# Patient Record
Sex: FEMALE | Race: WHITE | NOT HISPANIC OR LATINO | Employment: OTHER | URBAN - METROPOLITAN AREA
[De-identification: names, ages, dates, MRNs, and addresses within clinical notes are randomized per-mention and may not be internally consistent; named-entity substitution may affect disease eponyms.]

---

## 2017-06-13 ENCOUNTER — HOSPITAL ENCOUNTER (OUTPATIENT)
Dept: MAMMOGRAPHY | Facility: HOSPITAL | Age: 62
Discharge: HOME | End: 2017-06-13
Attending: INTERNAL MEDICINE | Admitting: INTERNAL MEDICINE

## 2017-06-20 ENCOUNTER — HOSPITAL ENCOUNTER (OUTPATIENT)
Dept: FAMILY MEDICINE CLINIC | Facility: CLINIC | Age: 62
Setting detail: SPECIMEN
Discharge: HOME | End: 2017-06-20
Attending: INTERNAL MEDICINE | Admitting: INTERNAL MEDICINE

## 2017-06-20 LAB
ALBUMIN SERPL-MCNC: 4.1 G/DL (ref 3.5–4.8)
ALBUMIN/GLOB SERPL: 1.5 {RATIO} (ref 1–1.7)
ALP SERPL-CCNC: 77 IU/L (ref 32–91)
ALT SERPL-CCNC: 28 IU/L (ref 14–54)
ANION GAP SERPL CALC-SCNC: 12.3 MMOL/L (ref 10–20)
AST SERPL-CCNC: 27 IU/L (ref 15–41)
BACTERIA SPEC AEROBE CULT: NORMAL
BASOPHILS # BLD AUTO: 0 10*3/UL (ref 0–0.2)
BASOPHILS NFR BLD AUTO: 1 % (ref 0–2)
BILIRUB SERPL-MCNC: 0.6 MG/DL (ref 0.3–1.2)
BUN SERPL-MCNC: 13 MG/DL (ref 8–20)
BUN/CREAT SERPL: 16.3 (ref 5.4–26.2)
CALCIUM SERPL-MCNC: 9.4 MG/DL (ref 8.9–10.3)
CHLORIDE SERPL-SCNC: 105 MMOL/L (ref 101–111)
CHOLEST SERPL-MCNC: 173 MG/DL
CHOLEST/HDLC SERPL: 2.9 {RATIO}
CONV CO2: 27 MMOL/L (ref 22–32)
CONV LDL CHOLESTEROL DIRECT: 99 MG/DL (ref 0–100)
CONV TOTAL PROTEIN: 6.8 G/DL (ref 6.1–7.9)
CREAT UR-MCNC: 0.8 MG/DL (ref 0.4–1)
DIFFERENTIAL METHOD BLD: (no result)
EOSINOPHIL # BLD AUTO: 0.1 10*3/UL (ref 0–0.3)
EOSINOPHIL # BLD AUTO: 2 % (ref 0–3)
ERYTHROCYTE [DISTWIDTH] IN BLOOD BY AUTOMATED COUNT: 16 % (ref 11.5–14.5)
GLOBULIN UR ELPH-MCNC: 2.7 G/DL (ref 2.5–3.8)
GLUCOSE SERPL-MCNC: 102 MG/DL (ref 65–99)
HCT VFR BLD AUTO: 40.2 % (ref 35–49)
HDLC SERPL-MCNC: 60 MG/DL
HGB BLD-MCNC: 13.4 G/DL (ref 12–15)
LDLC/HDLC SERPL: 1.6 {RATIO}
LIPID INTERPRETATION: NORMAL
LYMPHOCYTES # BLD AUTO: 1.6 10*3/UL (ref 0.8–4.8)
LYMPHOCYTES NFR BLD AUTO: 30 % (ref 18–42)
Lab: NORMAL
MCH RBC QN AUTO: 28 PG (ref 26–32)
MCHC RBC AUTO-ENTMCNC: 33.2 G/DL (ref 32–36)
MCV RBC AUTO: 84.1 FL (ref 80–94)
MICRO REPORT STATUS: NORMAL
MONOCYTES # BLD AUTO: 0.4 10*3/UL (ref 0.1–1.3)
MONOCYTES NFR BLD AUTO: 7 % (ref 2–11)
NEUTROPHILS # BLD AUTO: 3.3 10*3/UL (ref 2.3–8.6)
NEUTROPHILS NFR BLD AUTO: 60 % (ref 50–75)
NRBC BLD AUTO-RTO: 0 /100{WBCS}
NRBC/RBC NFR BLD MANUAL: 0 10*3/UL
PLATELET # BLD AUTO: 190 10*3/UL (ref 150–450)
PMV BLD AUTO: 9.4 FL (ref 7.4–10.4)
POTASSIUM SERPL-SCNC: 4.3 MMOL/L (ref 3.6–5.1)
RBC # BLD AUTO: 4.78 10*6/UL (ref 4–5.4)
SODIUM SERPL-SCNC: 140 MMOL/L (ref 136–144)
SPECIMEN SOURCE: NORMAL
TRIGL SERPL-MCNC: 49 MG/DL
VLDLC SERPL CALC-MCNC: 13.5 MG/DL
WBC # BLD AUTO: 5.5 10*3/UL (ref 4.5–11.5)

## 2017-06-21 LAB — CANCER AG125 SERPL-ACNC: 8 U/ML (ref 0–35)

## 2018-06-18 ENCOUNTER — HOSPITAL ENCOUNTER (OUTPATIENT)
Dept: MAMMOGRAPHY | Facility: HOSPITAL | Age: 63
Discharge: HOME | End: 2018-06-18
Attending: INTERNAL MEDICINE | Admitting: INTERNAL MEDICINE

## 2018-06-22 ENCOUNTER — HOSPITAL ENCOUNTER (OUTPATIENT)
Dept: FAMILY MEDICINE CLINIC | Facility: CLINIC | Age: 63
Setting detail: SPECIMEN
Discharge: HOME | End: 2018-06-22
Attending: INTERNAL MEDICINE | Admitting: INTERNAL MEDICINE

## 2018-06-22 LAB
ALBUMIN SERPL-MCNC: 4.4 G/DL (ref 3.5–4.8)
ALBUMIN/GLOB SERPL: 1.6 {RATIO} (ref 1–1.7)
ALP SERPL-CCNC: 73 IU/L (ref 32–91)
ALT SERPL-CCNC: 19 IU/L (ref 14–54)
ANION GAP SERPL CALC-SCNC: 11.2 MMOL/L (ref 10–20)
AST SERPL-CCNC: 23 IU/L (ref 15–41)
BASOPHILS # BLD AUTO: 0 10*3/UL (ref 0–0.2)
BASOPHILS NFR BLD AUTO: 1 % (ref 0–2)
BILIRUB SERPL-MCNC: 0.6 MG/DL (ref 0.3–1.2)
BILIRUB UR QL STRIP: NEGATIVE MG/DL
BUN SERPL-MCNC: 17 MG/DL (ref 8–20)
BUN/CREAT SERPL: 21.3 (ref 5.4–26.2)
CALCIUM SERPL-MCNC: 9.6 MG/DL (ref 8.9–10.3)
CASTS URNS QL MICRO: NORMAL /[LPF]
CHLORIDE SERPL-SCNC: 103 MMOL/L (ref 101–111)
CHOLEST SERPL-MCNC: 210 MG/DL
CHOLEST/HDLC SERPL: 2.6 {RATIO}
COLOR UR: YELLOW
CONV BACTERIA IN URINE MICRO: NEGATIVE
CONV CLARITY OF URINE: CLEAR
CONV CO2: 28 MMOL/L (ref 22–32)
CONV HYALINE CASTS IN URINE MICRO: 0 /[LPF] (ref 0–5)
CONV LDL CHOLESTEROL DIRECT: 118 MG/DL (ref 0–100)
CONV PROTEIN IN URINE BY AUTOMATED TEST STRIP: NEGATIVE MG/DL
CONV SMALL ROUND CELLS: NORMAL /[HPF]
CONV TOTAL PROTEIN: 7.2 G/DL (ref 6.1–7.9)
CONV UROBILINOGEN IN URINE BY AUTOMATED TEST STRIP: 0.2 MG/DL
CREAT UR-MCNC: 0.8 MG/DL (ref 0.4–1)
CULTURE INDICATED?: NORMAL
DIFFERENTIAL METHOD BLD: (no result)
EOSINOPHIL # BLD AUTO: 0.1 10*3/UL (ref 0–0.3)
EOSINOPHIL # BLD AUTO: 2 % (ref 0–3)
ERYTHROCYTE [DISTWIDTH] IN BLOOD BY AUTOMATED COUNT: 16.7 % (ref 11.5–14.5)
GLOBULIN UR ELPH-MCNC: 2.8 G/DL (ref 2.5–3.8)
GLUCOSE SERPL-MCNC: 82 MG/DL (ref 65–99)
GLUCOSE UR QL: NEGATIVE MG/DL
HCT VFR BLD AUTO: 42.2 % (ref 35–49)
HDLC SERPL-MCNC: 81 MG/DL
HGB BLD-MCNC: 13.7 G/DL (ref 12–15)
HGB UR QL STRIP: NEGATIVE
KETONES UR QL STRIP: NEGATIVE MG/DL
LDLC/HDLC SERPL: 1.5 {RATIO}
LEUKOCYTE ESTERASE UR QL STRIP: NEGATIVE
LIPID INTERPRETATION: ABNORMAL
LYMPHOCYTES # BLD AUTO: 1.6 10*3/UL (ref 0.8–4.8)
LYMPHOCYTES NFR BLD AUTO: 32 % (ref 18–42)
MCH RBC QN AUTO: 27.3 PG (ref 26–32)
MCHC RBC AUTO-ENTMCNC: 32.4 G/DL (ref 32–36)
MCV RBC AUTO: 84.2 FL (ref 80–94)
MONOCYTES # BLD AUTO: 0.3 10*3/UL (ref 0.1–1.3)
MONOCYTES NFR BLD AUTO: 6 % (ref 2–11)
NEUTROPHILS # BLD AUTO: 3 10*3/UL (ref 2.3–8.6)
NEUTROPHILS NFR BLD AUTO: 59 % (ref 50–75)
NITRITE UR QL STRIP: NEGATIVE
NRBC BLD AUTO-RTO: 0 /100{WBCS}
NRBC/RBC NFR BLD MANUAL: 0 10*3/UL
PH UR STRIP.AUTO: 5.5 [PH] (ref 4.5–8)
PLATELET # BLD AUTO: 196 10*3/UL (ref 150–450)
PMV BLD AUTO: 8.9 FL (ref 7.4–10.4)
POTASSIUM SERPL-SCNC: 4.2 MMOL/L (ref 3.6–5.1)
RBC # BLD AUTO: 5.01 10*6/UL (ref 4–5.4)
RBC #/AREA URNS HPF: 1 /[HPF] (ref 0–3)
SODIUM SERPL-SCNC: 138 MMOL/L (ref 136–144)
SP GR UR: 1.02 (ref 1–1.03)
SPERM URNS QL MICRO: NORMAL /[HPF]
SQUAMOUS SPT QL MICRO: 1 /[HPF] (ref 0–5)
TRIGL SERPL-MCNC: 48 MG/DL
UNIDENT CRYS URNS QL MICRO: NORMAL /[HPF]
VLDLC SERPL CALC-MCNC: 11 MG/DL
WBC # BLD AUTO: 5.1 10*3/UL (ref 4.5–11.5)
WBC #/AREA URNS HPF: 1 /[HPF] (ref 0–5)
YEAST SPEC QL WET PREP: NORMAL /[HPF]

## 2019-03-13 ENCOUNTER — HOSPITAL ENCOUNTER (OUTPATIENT)
Dept: URGENT CARE | Facility: CLINIC | Age: 64
Discharge: HOME | End: 2019-03-13
Attending: FAMILY MEDICINE | Admitting: FAMILY MEDICINE

## 2019-06-28 RX ORDER — ALPRAZOLAM 0.5 MG/1
TABLET ORAL
Qty: 60 TABLET | Refills: 2 | Status: SHIPPED | OUTPATIENT
Start: 2019-06-28 | End: 2020-01-24

## 2019-07-22 ENCOUNTER — TELEPHONE (OUTPATIENT)
Dept: FAMILY MEDICINE CLINIC | Facility: CLINIC | Age: 64
End: 2019-07-22

## 2019-07-22 DIAGNOSIS — Z12.39 SCREENING FOR BREAST CANCER: Primary | ICD-10-CM

## 2019-07-22 NOTE — TELEPHONE ENCOUNTER
Patient called and scheduled physical on 8/28 and fasting labs on 8/21. Please enter lab order.     Also, patient is needing annual mammo order sent to [facility].

## 2019-07-25 DIAGNOSIS — M19.90 OSTEOARTHRITIS, UNSPECIFIED OSTEOARTHRITIS TYPE, UNSPECIFIED SITE: Primary | ICD-10-CM

## 2019-07-25 DIAGNOSIS — Z00.00 PREVENTATIVE HEALTH CARE: ICD-10-CM

## 2019-07-25 NOTE — TELEPHONE ENCOUNTER
"I wrote the order for the labs but I\"m not sure which one is the correct one for the mammo so I left it for you to show me!  "

## 2019-07-29 ENCOUNTER — HOSPITAL ENCOUNTER (OUTPATIENT)
Dept: MAMMOGRAPHY | Facility: HOSPITAL | Age: 64
Discharge: HOME | End: 2019-07-29
Admitting: INTERNAL MEDICINE

## 2019-07-29 DIAGNOSIS — Z12.39 SCREENING FOR BREAST CANCER: ICD-10-CM

## 2019-07-29 PROCEDURE — 77067 SCR MAMMO BI INCL CAD: CPT

## 2019-07-29 PROCEDURE — 77063 BREAST TOMOSYNTHESIS BI: CPT

## 2019-08-21 ENCOUNTER — LAB (OUTPATIENT)
Dept: FAMILY MEDICINE CLINIC | Facility: CLINIC | Age: 64
End: 2019-08-21

## 2019-08-21 DIAGNOSIS — M19.90 OSTEOARTHRITIS, UNSPECIFIED OSTEOARTHRITIS TYPE, UNSPECIFIED SITE: ICD-10-CM

## 2019-08-21 DIAGNOSIS — Z00.00 PREVENTATIVE HEALTH CARE: ICD-10-CM

## 2019-08-21 LAB
25(OH)D3 SERPL-MCNC: 29.7 NG/ML (ref 30–100)
ALBUMIN SERPL-MCNC: 4 G/DL (ref 3.5–4.8)
ALBUMIN/GLOB SERPL: 1.5 G/DL (ref 1–1.7)
ALP SERPL-CCNC: 70 U/L (ref 32–91)
ALT SERPL W P-5'-P-CCNC: 22 U/L (ref 14–54)
ANION GAP SERPL CALCULATED.3IONS-SCNC: 15.4 MMOL/L (ref 5–15)
ARTICHOKE IGE QN: 112 MG/DL (ref 0–100)
AST SERPL-CCNC: 21 U/L (ref 15–41)
BASOPHILS # BLD AUTO: 0 10*3/MM3 (ref 0–0.2)
BASOPHILS NFR BLD AUTO: 0.6 % (ref 0–1.5)
BILIRUB SERPL-MCNC: 0.6 MG/DL (ref 0.3–1.2)
BUN BLD-MCNC: 12 MG/DL (ref 8–20)
BUN/CREAT SERPL: 15 (ref 5.4–26.2)
CALCIUM SPEC-SCNC: 9.1 MG/DL (ref 8.9–10.3)
CHLORIDE SERPL-SCNC: 100 MMOL/L (ref 101–111)
CHOLEST SERPL-MCNC: 186 MG/DL
CO2 SERPL-SCNC: 26 MMOL/L (ref 22–32)
CREAT BLD-MCNC: 0.8 MG/DL (ref 0.4–1)
DEPRECATED RDW RBC AUTO: 49.4 FL (ref 37–54)
EOSINOPHIL # BLD AUTO: 0.1 10*3/MM3 (ref 0–0.4)
EOSINOPHIL NFR BLD AUTO: 1.7 % (ref 0.3–6.2)
ERYTHROCYTE [DISTWIDTH] IN BLOOD BY AUTOMATED COUNT: 15.9 % (ref 12.3–15.4)
GFR SERPL CREATININE-BSD FRML MDRD: 72 ML/MIN/1.73
GLOBULIN UR ELPH-MCNC: 2.6 GM/DL (ref 2.5–3.8)
GLUCOSE BLD-MCNC: 99 MG/DL (ref 65–99)
HCT VFR BLD AUTO: 38.3 % (ref 34–46.6)
HDLC SERPL QL: 2.91
HDLC SERPL-MCNC: 64 MG/DL
HGB BLD-MCNC: 12.7 G/DL (ref 12–15.9)
LDLC/HDLC SERPL: 1.64 {RATIO}
LYMPHOCYTES # BLD AUTO: 1.6 10*3/MM3 (ref 0.7–3.1)
LYMPHOCYTES NFR BLD AUTO: 31.9 % (ref 19.6–45.3)
MCH RBC QN AUTO: 29.2 PG (ref 26.6–33)
MCHC RBC AUTO-ENTMCNC: 33.2 G/DL (ref 31.5–35.7)
MCV RBC AUTO: 87.9 FL (ref 79–97)
MONOCYTES # BLD AUTO: 0.3 10*3/MM3 (ref 0.1–0.9)
MONOCYTES NFR BLD AUTO: 6.8 % (ref 5–12)
NEUTROPHILS # BLD AUTO: 3 10*3/MM3 (ref 1.7–7)
NEUTROPHILS NFR BLD AUTO: 59 % (ref 42.7–76)
NRBC BLD AUTO-RTO: 0.1 /100 WBC (ref 0–0.2)
PLATELET # BLD AUTO: 178 10*3/MM3 (ref 140–450)
PMV BLD AUTO: 8.7 FL (ref 6–12)
POTASSIUM BLD-SCNC: 4.4 MMOL/L (ref 3.6–5.1)
PROT SERPL-MCNC: 6.6 G/DL (ref 6.1–7.9)
RBC # BLD AUTO: 4.36 10*6/MM3 (ref 3.77–5.28)
SODIUM BLD-SCNC: 137 MMOL/L (ref 136–144)
TRIGL SERPL-MCNC: 86 MG/DL
TSH SERPL DL<=0.05 MIU/L-ACNC: 2.91 MIU/ML (ref 0.34–5.6)
VLDLC SERPL-MCNC: 17.2 MG/DL
WBC NRBC COR # BLD: 5.1 10*3/MM3 (ref 3.4–10.8)

## 2019-08-21 PROCEDURE — 80053 COMPREHEN METABOLIC PANEL: CPT | Performed by: INTERNAL MEDICINE

## 2019-08-21 PROCEDURE — 82306 VITAMIN D 25 HYDROXY: CPT | Performed by: INTERNAL MEDICINE

## 2019-08-21 PROCEDURE — 36415 COLL VENOUS BLD VENIPUNCTURE: CPT

## 2019-08-21 PROCEDURE — 84443 ASSAY THYROID STIM HORMONE: CPT | Performed by: INTERNAL MEDICINE

## 2019-08-21 PROCEDURE — 85025 COMPLETE CBC W/AUTO DIFF WBC: CPT | Performed by: INTERNAL MEDICINE

## 2019-08-21 PROCEDURE — 80061 LIPID PANEL: CPT | Performed by: INTERNAL MEDICINE

## 2019-08-28 ENCOUNTER — OFFICE VISIT (OUTPATIENT)
Dept: FAMILY MEDICINE CLINIC | Facility: CLINIC | Age: 64
End: 2019-08-28

## 2019-08-28 VITALS
SYSTOLIC BLOOD PRESSURE: 142 MMHG | WEIGHT: 174 LBS | BODY MASS INDEX: 29.71 KG/M2 | HEART RATE: 60 BPM | HEIGHT: 64 IN | RESPIRATION RATE: 16 BRPM | TEMPERATURE: 97.8 F | OXYGEN SATURATION: 90 % | DIASTOLIC BLOOD PRESSURE: 78 MMHG

## 2019-08-28 DIAGNOSIS — M85.859 OSTEOPENIA OF NECK OF FEMUR, UNSPECIFIED LATERALITY: Primary | ICD-10-CM

## 2019-08-28 DIAGNOSIS — Z00.00 ENCOUNTER FOR GENERAL ADULT MEDICAL EXAMINATION WITHOUT ABNORMAL FINDINGS: ICD-10-CM

## 2019-08-28 PROBLEM — F41.9 ANXIETY DISORDER: Status: ACTIVE | Noted: 2019-08-28

## 2019-08-28 PROBLEM — M19.90 OSTEOARTHRITIS: Status: ACTIVE | Noted: 2019-08-28

## 2019-08-28 PROBLEM — C50.919 BREAST CANCER: Status: ACTIVE | Noted: 2019-08-28

## 2019-08-28 PROBLEM — Z80.0 FAMILY HISTORY OF MALIGNANT NEOPLASM OF COLON: Status: ACTIVE | Noted: 2019-08-28

## 2019-08-28 PROCEDURE — G0402 INITIAL PREVENTIVE EXAM: HCPCS | Performed by: INTERNAL MEDICINE

## 2019-08-28 RX ORDER — CHLORAL HYDRATE 500 MG
1 CAPSULE ORAL
COMMUNITY

## 2019-08-28 RX ORDER — MELOXICAM 15 MG/1
15 TABLET ORAL DAILY
COMMUNITY
End: 2020-03-04

## 2019-08-28 RX ORDER — MULTIPLE VITAMINS W/ MINERALS TAB 9MG-400MCG
1 TAB ORAL DAILY
COMMUNITY

## 2019-08-28 RX ORDER — OMEPRAZOLE 20 MG/1
20 CAPSULE, DELAYED RELEASE ORAL DAILY PRN
COMMUNITY

## 2019-08-28 RX ORDER — ALPRAZOLAM 0.5 MG/1
0.5 TABLET ORAL 2 TIMES DAILY
COMMUNITY
End: 2019-08-28 | Stop reason: SDUPTHER

## 2019-08-28 NOTE — PROGRESS NOTES
The ABCs of the Annual Wellness Visit  Welcome to Medicare Visit    Chief Complaint   Patient presents with   • Annual Exam     Welcome to [facility] exam       Subjective   History of Present Illness:  Kitty Berry is a 63 y.o. female who presents for a  Welcome to Medicare Visit. Pt  Fell this year in April- on tree nut and broke foot.  All healed at this time.  sleeping well.  With 1/2 of xanax every night.  No chest pain or difficulty breathing- no wheezing, no GERD   increased 20 lbs  Since weight watchers-- realizes french fries and chips.  Understands more activity is needed again. Joints starting to bother her.   right shin with cramps for a couple weeks - now gone.    HEALTH RISK ASSESSMENT    Recent Hospitalizations:  No hospitalization(s) within the last year.    Current Medical Providers:  Patient Care Team:  Nadia Mcclure MD as PCP - General    Smoking Status:  Social History     Tobacco Use   Smoking Status Former Smoker   • Types: Cigarettes   • Last attempt to quit:    • Years since quittin.6   Smokeless Tobacco Never Used       Alcohol Consumption:  Social History     Substance and Sexual Activity   Alcohol Use Not on file       Depression Screen:   PHQ-2/PHQ-9 Depression Screening 2019   Little interest or pleasure in doing things 0   Feeling down, depressed, or hopeless 0   Total Score 0       Fall Risk Screen:  STEADI Fall Risk Assessment was completed, and patient is at HIGH risk for falls. Assessment completed on:2019    Health Habits and Functional and Cognitive Screening:  Functional & Cognitive Status 2019   Do you have difficulty preparing food and eating? No   Do you have difficulty bathing yourself, getting dressed or grooming yourself? No   Do you have difficulty using the toilet? No   Do you have difficulty moving around from place to place? No   Do you have trouble with steps or getting out of a bed or a chair? Yes   Current Diet Well Balanced Diet   Dental  Exam Up to date   Eye Exam Up to date   Exercise (times per week) 3 times per week   Current Exercise Activities Include Gardening   Do you need help using the phone?  No   Are you deaf or do you have serious difficulty hearing?  No   Do you need help with transportation? No   Do you need help shopping? No   Do you need help preparing meals?  No   Do you need help with housework?  No   Do you need help with laundry? No   Do you need help taking your medications? No   Do you need help managing money? No   Do you ever drive or ride in a car without wearing a seat belt? No   Have you felt unusual stress, anger or loneliness in the last month? No   Who do you live with? Spouse   If you need help, do you have trouble finding someone available to you? No   Have you been bothered in the last four weeks by sexual problems? No   Do you have difficulty concentrating, remembering or making decisions? No         Does the patient have evidence of cognitive impairment? No    Asprin use counseling:Does not need AS but is currently taking (discussed benefits vs risks and patient elects to stay on ASA)    Visual Acuity:    No exam data present    Age-appropriate Screening Schedule:  Refer to the list below for future screening recommendations based on patient's age, sex and/or medical conditions. Orders for these recommended tests are listed in the plan section. The patient has been provided with a written plan.    Health Maintenance   Topic Date Due   • TDAP/TD VACCINES (1 - Tdap) 10/28/1972   • ZOSTER VACCINE (1 of 2) 10/28/2003   • PNEUMOCOCCAL VACCINES (65+ LOW/MEDIUM RISK) (1 of 2 - PCV13) 10/28/2018   • COLONOSCOPY  06/28/2019   • INFLUENZA VACCINE  08/01/2019   • MAMMOGRAM  07/29/2021          The following portions of the patient's history were reviewed and updated as appropriate: current medications, past family history, past medical history, past social history, past surgical history and problem list.    Outpatient  Medications Prior to Visit   Medication Sig Dispense Refill   • ALPRAZolam (XANAX) 0.5 MG tablet TAKE ONE TABLET BY MOUTH TWICE A DAY 60 tablet 2   • meloxicam (MOBIC) 15 MG tablet Take 15 mg by mouth Daily.     • Multiple Vitamins-Minerals (MULTIVITAMIN WITH MINERALS) tablet tablet Take 1 tablet by mouth Daily.     • Omega-3 Fatty Acids (FISH OIL) 1000 MG capsule capsule Take 1 capsule by mouth Daily With Breakfast.     • omeprazole (priLOSEC) 20 MG capsule Take 20 mg by mouth Daily.     • ALPRAZolam (XANAX) 0.5 MG tablet Take 0.5 mg by mouth 2 (Two) Times a Day.       No facility-administered medications prior to visit.        Patient Active Problem List   Diagnosis   • Anxiety disorder   • Breast cancer (CMS/HCC)   • Encounter for general adult medical examination without abnormal findings   • Family history of malignant neoplasm of colon   • Gastroesophageal reflux disease   • Hypertension   • Osteoarthritis   • Screening for colon cancer       Advanced Care Planning:  Patient has an advance directive - a copy has been provided and is visible in patient header, Patient does not have an advance directive - not interested in additional information    Review of Systems   Constitutional: Negative for chills and fatigue.   HENT: Negative for congestion.    Respiratory: Negative for apnea, cough and wheezing.    Cardiovascular: Negative for chest pain.   Gastrointestinal: Negative for abdominal distention.   Musculoskeletal: Negative for arthralgias.   Neurological: Negative for seizures.   Psychiatric/Behavioral: Positive for sleep disturbance.       Compared to one year ago, the patient feels her physical health is the same.  Compared to one year ago, the patient feels her mental health is the same.    Reviewed chart for potential of high risk medication in the elderly: yes  Reviewed chart for potential of harmful drug interactions in the elderly:yes    Objective         Vitals:    08/28/19 1456   BP: 142/78   BP  "Location: Left arm   Patient Position: Sitting   Cuff Size: Adult   Pulse: 60   Resp: 16   Temp: 97.8 °F (36.6 °C)   TempSrc: Oral   SpO2: 90%   Weight: 78.9 kg (174 lb)   Height: 162.6 cm (64\")   PainSc:   4   PainLoc: Knee       Body mass index is 29.87 kg/m².  Discussed the patient's BMI with her. The BMI is in the acceptable range.    Physical Exam   Constitutional: She is oriented to person, place, and time. She appears well-developed and well-nourished.   HENT:   Head: Normocephalic and atraumatic.   Right Ear: Tympanic membrane normal.   Left Ear: Tympanic membrane normal.   Eyes: Pupils are equal, round, and reactive to light.   Neck: Normal range of motion. Neck supple.   Cardiovascular: Normal rate and regular rhythm.   No murmur heard.  Pulmonary/Chest: Effort normal and breath sounds normal. Right breast exhibits no inverted nipple, no mass and no nipple discharge. Left breast exhibits no inverted nipple, no mass and no nipple discharge.   Abdominal: Soft. Bowel sounds are normal. She exhibits no distension.   Musculoskeletal: Normal range of motion.   Neurological: She is oriented to person, place, and time.   Skin: Capillary refill takes less than 2 seconds.   Psychiatric: She has a normal mood and affect.   Nursing note and vitals reviewed.      Lab Results   Component Value Date    TRIG 86 08/21/2019    HDL 64 08/21/2019     (H) 08/21/2019    VLDL 17.2 08/21/2019          ECG 12 Lead  Date/Time: 8/29/2019 8:10 AM  Performed by: Nadia Mcclure MD  Authorized by: Nadia Mcclure MD   Comparison: not compared with previous ECG   Previous ECG: no previous ECG available  Rhythm: sinus rhythm  Rate: normal  Conduction: conduction normal  ST Segments: ST segments normal  QRS axis: normal  Other: no other findings    Clinical impression: normal ECG  Comments: NSR            Assessment/Plan   Medicare Risks and Personalized Health Plan  CMS Preventative Services Quick Reference  Fall " Risk    The above risks/problems have been discussed with the patient.  Pertinent information has been shared with the patient in the After Visit Summary.  Follow up plans and orders are seen below in the Assessment/Plan Section.    Diagnoses and all orders for this visit:    1. Osteopenia of neck of femur, unspecified laterality (Primary)  -     DEXA Bone Density Axial; Future    2. Encounter for general adult medical examination without abnormal findings  -     ECG 12 Lead        Follow Up:  Return in about 6 months (around 2/28/2020) for Medicare Wellness.     An After Visit Summary and PPPS were given to the patient.     During this visit for their annual exam, we reviewed their personal history, social history and family history.  We went over their medications and all the recommended health maintenence items for their age group. They were given the opportunity to ask questions and discuss other concerns.  [facility]

## 2019-08-29 PROCEDURE — 93000 ELECTROCARDIOGRAM COMPLETE: CPT | Performed by: INTERNAL MEDICINE

## 2019-09-04 ENCOUNTER — HOSPITAL ENCOUNTER (OUTPATIENT)
Dept: BONE DENSITY | Facility: HOSPITAL | Age: 64
Discharge: HOME | End: 2019-09-04
Admitting: INTERNAL MEDICINE

## 2019-09-04 DIAGNOSIS — M85.859 OSTEOPENIA OF NECK OF FEMUR, UNSPECIFIED LATERALITY: ICD-10-CM

## 2019-09-04 PROCEDURE — 77080 DXA BONE DENSITY AXIAL: CPT

## 2020-01-24 RX ORDER — ALPRAZOLAM 0.5 MG/1
TABLET ORAL
Qty: 60 TABLET | Refills: 2 | Status: SHIPPED | OUTPATIENT
Start: 2020-01-24 | End: 2020-09-14

## 2020-01-31 ENCOUNTER — TELEPHONE (OUTPATIENT)
Dept: FAMILY MEDICINE CLINIC | Facility: CLINIC | Age: 65
End: 2020-01-31

## 2020-01-31 NOTE — TELEPHONE ENCOUNTER
Will need appt before gets another Rx from me- if still has several  Rf's then not needed be seen early March

## 2020-01-31 NOTE — TELEPHONE ENCOUNTER
I called patient to reschedule her 6 month F/U appointment from 2/28/20. She states it was scheduled due to her being on Xanax. She has cut back and has only used 2 RF of this med and you had originally given her 6 RF. She wanted a message sent back to ask if she still needs appointment, now in early March, for F/U since she has only used 2 RF of the med. Thank you.

## 2020-02-06 ENCOUNTER — TELEPHONE (OUTPATIENT)
Dept: FAMILY MEDICINE CLINIC | Facility: CLINIC | Age: 65
End: 2020-02-06

## 2020-02-06 RX ORDER — LISINOPRIL AND HYDROCHLOROTHIAZIDE 12.5; 1 MG/1; MG/1
1 TABLET ORAL DAILY
Qty: 30 TABLET | Refills: 2 | Status: SHIPPED | OUTPATIENT
Start: 2020-02-06 | End: 2020-02-07

## 2020-02-06 NOTE — TELEPHONE ENCOUNTER
Patient went to give blood today and her blood pressure was too high.  It was 145/104.  She took it at home earlier and it was 135/85.  She used to be on bp medication but lost a bunch of weight and went off of it. She has gained about 30 pounds back. She does start Silver Sneakers on Monday trying to get some of the weight off.  Do you want to call in a bp medication?

## 2020-02-07 RX ORDER — AMLODIPINE BESYLATE 5 MG/1
5 TABLET ORAL DAILY
Qty: 30 TABLET | Refills: 5 | Status: SHIPPED | OUTPATIENT
Start: 2020-02-07 | End: 2020-08-04

## 2020-02-07 NOTE — TELEPHONE ENCOUNTER
Pt believes Lisinopril is the med that she took before that made her cough and drove her crazy. Do you want to switch to another med? Thank you.

## 2020-03-04 RX ORDER — MELOXICAM 15 MG/1
TABLET ORAL
Qty: 30 TABLET | Refills: 2 | Status: SHIPPED | OUTPATIENT
Start: 2020-03-04 | End: 2020-07-02

## 2020-06-03 ENCOUNTER — ON CAMPUS - OUTPATIENT (OUTPATIENT)
Dept: URBAN - METROPOLITAN AREA HOSPITAL 2 | Facility: HOSPITAL | Age: 65
End: 2020-06-03
Payer: MEDICARE

## 2020-06-03 ENCOUNTER — OFFICE (OUTPATIENT)
Dept: URBAN - METROPOLITAN AREA PATHOLOGY 4 | Facility: PATHOLOGY | Age: 65
End: 2020-06-03
Payer: COMMERCIAL

## 2020-06-03 ENCOUNTER — OFFICE (OUTPATIENT)
Dept: URBAN - METROPOLITAN AREA PATHOLOGY 4 | Facility: PATHOLOGY | Age: 65
End: 2020-06-03
Payer: MEDICARE

## 2020-06-03 VITALS
RESPIRATION RATE: 16 BRPM | HEART RATE: 67 BPM | OXYGEN SATURATION: 93 % | HEART RATE: 81 BPM | RESPIRATION RATE: 17 BRPM | HEART RATE: 76 BPM | HEART RATE: 66 BPM | RESPIRATION RATE: 18 BRPM | DIASTOLIC BLOOD PRESSURE: 83 MMHG | DIASTOLIC BLOOD PRESSURE: 72 MMHG | SYSTOLIC BLOOD PRESSURE: 114 MMHG | DIASTOLIC BLOOD PRESSURE: 78 MMHG | SYSTOLIC BLOOD PRESSURE: 118 MMHG | HEART RATE: 75 BPM | OXYGEN SATURATION: 100 % | DIASTOLIC BLOOD PRESSURE: 79 MMHG | WEIGHT: 187.4 LBS | OXYGEN SATURATION: 98 % | SYSTOLIC BLOOD PRESSURE: 101 MMHG | SYSTOLIC BLOOD PRESSURE: 130 MMHG | OXYGEN SATURATION: 97 % | SYSTOLIC BLOOD PRESSURE: 122 MMHG | SYSTOLIC BLOOD PRESSURE: 115 MMHG | DIASTOLIC BLOOD PRESSURE: 74 MMHG | DIASTOLIC BLOOD PRESSURE: 90 MMHG | HEIGHT: 62 IN | OXYGEN SATURATION: 99 % | HEART RATE: 71 BPM | TEMPERATURE: 97.5 F | SYSTOLIC BLOOD PRESSURE: 147 MMHG | OXYGEN SATURATION: 96 %

## 2020-06-03 DIAGNOSIS — K64.0 FIRST DEGREE HEMORRHOIDS: ICD-10-CM

## 2020-06-03 DIAGNOSIS — K57.30 DIVERTICULOSIS OF LARGE INTESTINE WITHOUT PERFORATION OR ABS: ICD-10-CM

## 2020-06-03 DIAGNOSIS — Z86.010 PERSONAL HISTORY OF COLONIC POLYPS: ICD-10-CM

## 2020-06-03 DIAGNOSIS — D12.3 BENIGN NEOPLASM OF TRANSVERSE COLON: ICD-10-CM

## 2020-06-03 DIAGNOSIS — D12.6 BENIGN NEOPLASM OF COLON, UNSPECIFIED: ICD-10-CM

## 2020-06-03 PROBLEM — K63.5 POLYP OF COLON: Status: ACTIVE | Noted: 2020-06-03

## 2020-06-03 PROBLEM — K57.32 DVTRCLI OF LG INT W/O PERFORATION OR ABSCESS W/O BLEEDING: Status: ACTIVE | Noted: 2020-06-03

## 2020-06-03 LAB
GI HISTOLOGY: A. UNSPECIFIED: (no result)
GI HISTOLOGY: PDF REPORT: (no result)

## 2020-06-03 PROCEDURE — 88305 TISSUE EXAM BY PATHOLOGIST: CPT | Mod: 26 | Performed by: INTERNAL MEDICINE

## 2020-06-03 PROCEDURE — 45385 COLONOSCOPY W/LESION REMOVAL: CPT | Mod: PT | Performed by: INTERNAL MEDICINE

## 2020-06-03 RX ORDER — CIPROFLOXACIN 500 MG/1
1000 TABLET, FILM COATED ORAL
Qty: 20 | Refills: 0 | Status: ACTIVE
Start: 2020-06-03

## 2020-07-02 ENCOUNTER — TELEPHONE (OUTPATIENT)
Dept: FAMILY MEDICINE CLINIC | Facility: CLINIC | Age: 65
End: 2020-07-02

## 2020-07-02 DIAGNOSIS — Z12.31 ENCOUNTER FOR SCREENING MAMMOGRAM FOR MALIGNANT NEOPLASM OF BREAST: ICD-10-CM

## 2020-07-02 DIAGNOSIS — Z12.39 SCREENING FOR BREAST CANCER: Primary | ICD-10-CM

## 2020-07-02 RX ORDER — MELOXICAM 15 MG/1
TABLET ORAL
Qty: 30 TABLET | Refills: 1 | Status: SHIPPED | OUTPATIENT
Start: 2020-07-02 | End: 2020-09-25

## 2020-07-02 NOTE — TELEPHONE ENCOUNTER
PATIENT IS REQUESTING FOR A LAB ORDER AND A MAMMOGRAM ORDER   SHE STATES  SHE WOULD LIKE TO KNOW IF YOU HAVE RECEIVED HER COLONOSCOPY RESULTS        GOOD CONTACT NUMBER     [phone] (P)

## 2020-07-07 ENCOUNTER — TELEPHONE (OUTPATIENT)
Dept: FAMILY MEDICINE CLINIC | Facility: CLINIC | Age: 65
End: 2020-07-07

## 2020-07-07 DIAGNOSIS — E55.9 VITAMIN D DEFICIENCY: ICD-10-CM

## 2020-07-07 DIAGNOSIS — Z12.31 ENCOUNTER FOR SCREENING MAMMOGRAM FOR MALIGNANT NEOPLASM OF BREAST: ICD-10-CM

## 2020-07-07 DIAGNOSIS — I10 ESSENTIAL HYPERTENSION: Primary | ICD-10-CM

## 2020-07-07 NOTE — TELEPHONE ENCOUNTER
Hub please give pt message below and find out where she would like to have her mammo and labs done.    Pathology of polyp was tubular adenoma (most common type non cancerous/ benign) so that would be 5 year for repeat.      Tried to call patient. Left Voice mail to find out where she wants to have her mammo and labs done at.

## 2020-07-07 NOTE — TELEPHONE ENCOUNTER
PATIENT CALLED IN REFERENCE TO MAMMOGRAM APPOINTMENT SHE WOULD LIKE TO GO TO [facility] AND SHE WOULD  LIKE TO HAVE HER LABS DONE AT THE OFFICE.    PLEASE CALL WITH APPOINTMENT TIME AND [phone]    SHE HAS AN APPOINTMENT ON 9/1/2020 AND WOULD LIKE TO HAVE THIS ALL DONE BEFORE THIS DATE.

## 2020-07-07 NOTE — TELEPHONE ENCOUNTER
Spoke with patient at 1:17pm and [facility] at 9:30am and instructed patient to call F to schedule mammogram.

## 2020-07-09 NOTE — TELEPHONE ENCOUNTER
Hub please give pt message below and find out where she would like to have her mammo and labs done.     Pathology of polyp was tubular adenoma (most common type non cancerous/ benign) so that would be 5 year for repeat.        Tried to call patient. Left Voice mail to find out where she wants to have her mammo and labs done at.  Tried to call again No answer left vm to call back

## 2020-07-10 ENCOUNTER — TELEPHONE (OUTPATIENT)
Dept: FAMILY MEDICINE CLINIC | Facility: CLINIC | Age: 65
End: 2020-07-10

## 2020-07-10 NOTE — TELEPHONE ENCOUNTER
PATIENT SAYS IF CALLBACK FROM  [facility] WAS ABOUT THE MAMMOGRAM THAT SHE HAS ALREADY SCHEDULED THAT AND IF THAT IS CASE SHE DOES NOT NEED TO CALL HER BACK.    IF IT IS REGARDING SOMETHING ELSE SHE CAN GO AHEAD AND GIVE HER A CALLBACK.    CALLBACK NUMBER IS:  [phone]

## 2020-08-04 RX ORDER — AMLODIPINE BESYLATE 5 MG/1
TABLET ORAL
Qty: 30 TABLET | Refills: 4 | Status: SHIPPED | OUTPATIENT
Start: 2020-08-04 | End: 2020-12-01 | Stop reason: SDUPTHER

## 2020-08-11 ENCOUNTER — HOSPITAL ENCOUNTER (OUTPATIENT)
Dept: MAMMOGRAPHY | Facility: HOSPITAL | Age: 65
Discharge: HOME | End: 2020-08-11
Admitting: INTERNAL MEDICINE

## 2020-08-11 DIAGNOSIS — Z12.31 ENCOUNTER FOR SCREENING MAMMOGRAM FOR MALIGNANT NEOPLASM OF BREAST: ICD-10-CM

## 2020-08-11 DIAGNOSIS — Z12.39 SCREENING FOR BREAST CANCER: ICD-10-CM

## 2020-08-11 PROCEDURE — 77067 SCR MAMMO BI INCL CAD: CPT

## 2020-08-11 PROCEDURE — 77063 BREAST TOMOSYNTHESIS BI: CPT

## 2020-08-25 ENCOUNTER — LAB (OUTPATIENT)
Dept: FAMILY MEDICINE CLINIC | Facility: CLINIC | Age: 65
End: 2020-08-25

## 2020-08-25 DIAGNOSIS — I10 ESSENTIAL HYPERTENSION: ICD-10-CM

## 2020-08-25 DIAGNOSIS — E55.9 VITAMIN D DEFICIENCY: ICD-10-CM

## 2020-08-25 LAB
25(OH)D3 SERPL-MCNC: 58.5 NG/ML (ref 30–100)
ALBUMIN SERPL-MCNC: 4.6 G/DL (ref 3.5–5.2)
ALBUMIN/GLOB SERPL: 2.1 G/DL
ALP SERPL-CCNC: 88 U/L (ref 39–117)
ALT SERPL W P-5'-P-CCNC: 68 U/L (ref 1–33)
ANION GAP SERPL CALCULATED.3IONS-SCNC: 12.8 MMOL/L (ref 5–15)
AST SERPL-CCNC: 40 U/L (ref 1–32)
BASOPHILS # BLD AUTO: 0.04 10*3/MM3 (ref 0–0.2)
BASOPHILS NFR BLD AUTO: 0.5 % (ref 0–1.5)
BILIRUB SERPL-MCNC: 0.3 MG/DL (ref 0–1.2)
BUN SERPL-MCNC: 19 MG/DL (ref 8–23)
BUN/CREAT SERPL: 21.6 (ref 7–25)
CALCIUM SPEC-SCNC: 9.2 MG/DL (ref 8.6–10.5)
CHLORIDE SERPL-SCNC: 97 MMOL/L (ref 98–107)
CHOLEST SERPL-MCNC: 229 MG/DL (ref 0–200)
CO2 SERPL-SCNC: 26.2 MMOL/L (ref 22–29)
CREAT SERPL-MCNC: 0.88 MG/DL (ref 0.57–1)
DEPRECATED RDW RBC AUTO: 45.7 FL (ref 37–54)
EOSINOPHIL # BLD AUTO: 0.07 10*3/MM3 (ref 0–0.4)
EOSINOPHIL NFR BLD AUTO: 0.8 % (ref 0.3–6.2)
ERYTHROCYTE [DISTWIDTH] IN BLOOD BY AUTOMATED COUNT: 13.9 % (ref 12.3–15.4)
GFR SERPL CREATININE-BSD FRML MDRD: 64 ML/MIN/1.73
GLOBULIN UR ELPH-MCNC: 2.2 GM/DL
GLUCOSE SERPL-MCNC: 93 MG/DL (ref 65–99)
HCT VFR BLD AUTO: 38 % (ref 34–46.6)
HDLC SERPL-MCNC: 69 MG/DL (ref 40–60)
HGB BLD-MCNC: 12.9 G/DL (ref 12–15.9)
IMM GRANULOCYTES # BLD AUTO: 0.06 10*3/MM3 (ref 0–0.05)
IMM GRANULOCYTES NFR BLD AUTO: 0.7 % (ref 0–0.5)
LDLC SERPL CALC-MCNC: 127 MG/DL (ref 0–100)
LDLC/HDLC SERPL: 1.84 {RATIO}
LYMPHOCYTES # BLD AUTO: 2.27 10*3/MM3 (ref 0.7–3.1)
LYMPHOCYTES NFR BLD AUTO: 27.5 % (ref 19.6–45.3)
MCH RBC QN AUTO: 30.5 PG (ref 26.6–33)
MCHC RBC AUTO-ENTMCNC: 33.9 G/DL (ref 31.5–35.7)
MCV RBC AUTO: 89.8 FL (ref 79–97)
MONOCYTES # BLD AUTO: 0.47 10*3/MM3 (ref 0.1–0.9)
MONOCYTES NFR BLD AUTO: 5.7 % (ref 5–12)
NEUTROPHILS NFR BLD AUTO: 5.34 10*3/MM3 (ref 1.7–7)
NEUTROPHILS NFR BLD AUTO: 64.8 % (ref 42.7–76)
NRBC BLD AUTO-RTO: 0 /100 WBC (ref 0–0.2)
PLATELET # BLD AUTO: 210 10*3/MM3 (ref 140–450)
PMV BLD AUTO: 10.4 FL (ref 6–12)
POTASSIUM SERPL-SCNC: 4.5 MMOL/L (ref 3.5–5.2)
PROT SERPL-MCNC: 6.8 G/DL (ref 6–8.5)
RBC # BLD AUTO: 4.23 10*6/MM3 (ref 3.77–5.28)
SODIUM SERPL-SCNC: 136 MMOL/L (ref 136–145)
TRIGL SERPL-MCNC: 165 MG/DL (ref 0–150)
TSH SERPL DL<=0.05 MIU/L-ACNC: 4.63 UIU/ML (ref 0.27–4.2)
VLDLC SERPL-MCNC: 33 MG/DL (ref 5–40)
WBC # BLD AUTO: 8.25 10*3/MM3 (ref 3.4–10.8)

## 2020-08-25 PROCEDURE — 80061 LIPID PANEL: CPT | Performed by: INTERNAL MEDICINE

## 2020-08-25 PROCEDURE — 85025 COMPLETE CBC W/AUTO DIFF WBC: CPT | Performed by: INTERNAL MEDICINE

## 2020-08-25 PROCEDURE — 80053 COMPREHEN METABOLIC PANEL: CPT | Performed by: INTERNAL MEDICINE

## 2020-08-25 PROCEDURE — 84443 ASSAY THYROID STIM HORMONE: CPT | Performed by: INTERNAL MEDICINE

## 2020-08-25 PROCEDURE — 82306 VITAMIN D 25 HYDROXY: CPT | Performed by: INTERNAL MEDICINE

## 2020-09-01 ENCOUNTER — OFFICE VISIT (OUTPATIENT)
Dept: FAMILY MEDICINE CLINIC | Facility: CLINIC | Age: 65
End: 2020-09-01

## 2020-09-01 ENCOUNTER — TELEPHONE (OUTPATIENT)
Dept: FAMILY MEDICINE CLINIC | Facility: CLINIC | Age: 65
End: 2020-09-01

## 2020-09-01 VITALS
RESPIRATION RATE: 16 BRPM | HEIGHT: 64 IN | HEART RATE: 95 BPM | DIASTOLIC BLOOD PRESSURE: 88 MMHG | WEIGHT: 196 LBS | BODY MASS INDEX: 33.46 KG/M2 | SYSTOLIC BLOOD PRESSURE: 130 MMHG | TEMPERATURE: 98 F | OXYGEN SATURATION: 96 %

## 2020-09-01 DIAGNOSIS — M15.9 PRIMARY OSTEOARTHRITIS INVOLVING MULTIPLE JOINTS: ICD-10-CM

## 2020-09-01 DIAGNOSIS — E03.9 ACQUIRED HYPOTHYROIDISM: ICD-10-CM

## 2020-09-01 DIAGNOSIS — Z23 ENCOUNTER FOR IMMUNIZATION: ICD-10-CM

## 2020-09-01 DIAGNOSIS — Z00.00 MEDICARE ANNUAL WELLNESS VISIT, INITIAL: Primary | ICD-10-CM

## 2020-09-01 DIAGNOSIS — Z13.6 ENCOUNTER FOR SCREENING FOR VASCULAR DISEASE: Primary | ICD-10-CM

## 2020-09-01 DIAGNOSIS — I10 ESSENTIAL HYPERTENSION: ICD-10-CM

## 2020-09-01 DIAGNOSIS — M51.37 DDD (DEGENERATIVE DISC DISEASE), LUMBOSACRAL: ICD-10-CM

## 2020-09-01 PROBLEM — M51.379 DDD (DEGENERATIVE DISC DISEASE), LUMBOSACRAL: Status: ACTIVE | Noted: 2020-09-01

## 2020-09-01 PROCEDURE — 99213 OFFICE O/P EST LOW 20 MIN: CPT | Performed by: INTERNAL MEDICINE

## 2020-09-01 PROCEDURE — G0438 PPPS, INITIAL VISIT: HCPCS | Performed by: INTERNAL MEDICINE

## 2020-09-01 PROCEDURE — G0009 ADMIN PNEUMOCOCCAL VACCINE: HCPCS | Performed by: INTERNAL MEDICINE

## 2020-09-01 PROCEDURE — 90732 PPSV23 VACC 2 YRS+ SUBQ/IM: CPT | Performed by: INTERNAL MEDICINE

## 2020-09-01 RX ORDER — LEVOTHYROXINE SODIUM 0.05 MG/1
50 TABLET ORAL DAILY
Qty: 30 TABLET | Refills: 3 | Status: SHIPPED | OUTPATIENT
Start: 2020-09-01 | End: 2021-01-11

## 2020-09-01 RX ORDER — AMMONIUM LACTATE 12 G/100G
LOTION TOPICAL
COMMUNITY
Start: 2020-08-12 | End: 2020-12-01

## 2020-09-01 NOTE — PATIENT INSTRUCTIONS
Pneumococcal Vaccine, Polyvalent solution for injection  What is this medicine?  PNEUMOCOCCAL VACCINE, POLYVALENT (ARAMIS mo PACO al [facility], kristine benitez) is a vaccine to prevent pneumococcus bacteria infection. These bacteria are a major cause of ear infections, Strep throat infections, and serious pneumonia, meningitis, or blood infections worldwide. These vaccines help the body to produce antibodies (protective substances) that help your body defend against these bacteria. This vaccine is recommended for people 2 years of age and older with health problems. It is also recommended for all adults over 50 years old. This vaccine will not treat an infection.  This medicine may be used for other purposes; ask your health care provider or pharmacist if you have questions.  COMMON BRAND NAME(S): Pneumovax 23  What should I tell my health care provider before I take this medicine?  They need to know if you have any of these conditions:  · bleeding problems  · bone marrow or organ transplant  · cancer, Hodgkin's disease  · fever  · infection  · immune system problems  · low platelet count in the blood  · seizures  · an unusual or allergic reaction to pneumococcal vaccine, diphtheria toxoid, other vaccines, latex, other medicines, foods, dyes, or preservatives  · pregnant or trying to get pregnant  · breast-feeding  How should I use this medicine?  This vaccine is for injection into a muscle or under the skin. It is given by a health care professional.  A copy of Vaccine Information Statements will be given before each vaccination. Read this sheet carefully each time. The sheet may change frequently.  Talk to your pediatrician regarding the use of this medicine in children. While this drug may be prescribed for children as young as 2 years of age for selected conditions, precautions do apply.  Overdosage: If you think you have taken too much of this medicine contact a poison control center or emergency room at  once.  NOTE: This medicine is only for you. Do not share this medicine with others.  What if I miss a dose?  It is important not to miss your dose. Call your doctor or health care professional if you are unable to keep an appointment.  What may interact with this medicine?  · medicines for cancer chemotherapy  · medicines that suppress your immune function  · medicines that treat or prevent blood clots like warfarin, enoxaparin, and dalteparin  · steroid medicines like prednisone or cortisone  This list may not describe all possible interactions. Give your health care provider a list of all the medicines, herbs, non-prescription drugs, or dietary supplements you use. Also tell them if you smoke, drink alcohol, or use illegal drugs. Some items may interact with your medicine.  What should I watch for while using this medicine?  Mild fever and pain should go away in 3 days or less. Report any unusual symptoms to your doctor or health care professional.  What side effects may I notice from receiving this medicine?  Side effects that you should report to your doctor or health care professional as soon as possible:  · allergic reactions like skin rash, itching or hives, swelling of the face, lips, or tongue  · breathing problems  · confused  · fever over 102 degrees F  · pain, tingling, numbness in the hands or feet  · seizures  · unusual bleeding or bruising  · unusual muscle weakness  Side effects that usually do not require medical attention (report to your doctor or health care professional if they continue or are bothersome):  · aches and pains  · diarrhea  · fever of 102 degrees F or less  · headache  · irritable  · loss of appetite  · pain, tender at site where injected  · trouble sleeping  This list may not describe all possible side effects. Call your doctor for medical advice about side effects. You may report side effects to FDA at 7-436-FDA-3092.  Where should I keep my medicine?  This does not apply. This  vaccine is given in a clinic, pharmacy, doctor's office, or other health care setting and will not be stored at home.  NOTE: This sheet is a summary. It may not cover all possible information. If you have questions about this medicine, talk to your doctor, pharmacist, or health care provider.  © 2020 [employer] (2009-07-24 14:32:37)
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
## 2020-09-02 ENCOUNTER — HOSPITAL ENCOUNTER (OUTPATIENT)
Dept: GENERAL RADIOLOGY | Facility: HOSPITAL | Age: 65
Discharge: HOME | End: 2020-09-02
Admitting: INTERNAL MEDICINE

## 2020-09-02 DIAGNOSIS — M51.37 DDD (DEGENERATIVE DISC DISEASE), LUMBOSACRAL: ICD-10-CM

## 2020-09-02 PROCEDURE — 72100 X-RAY EXAM L-S SPINE 2/3 VWS: CPT

## 2020-09-11 DIAGNOSIS — F41.9 ANXIETY: Primary | ICD-10-CM

## 2020-09-14 RX ORDER — ALPRAZOLAM 0.5 MG/1
TABLET ORAL
Qty: 60 TABLET | Refills: 1 | Status: SHIPPED | OUTPATIENT
Start: 2020-09-14 | End: 2020-12-01 | Stop reason: SDUPTHER

## 2020-09-25 RX ORDER — MELOXICAM 15 MG/1
TABLET ORAL
Qty: 30 TABLET | Refills: 0 | Status: SHIPPED | OUTPATIENT
Start: 2020-09-25 | End: 2020-11-16

## 2020-11-16 RX ORDER — MELOXICAM 15 MG/1
TABLET ORAL
Qty: 30 TABLET | Refills: 3 | Status: SHIPPED | OUTPATIENT
Start: 2020-11-16 | End: 2021-05-05

## 2020-12-01 ENCOUNTER — OFFICE VISIT (OUTPATIENT)
Dept: FAMILY MEDICINE CLINIC | Facility: CLINIC | Age: 65
End: 2020-12-01

## 2020-12-01 ENCOUNTER — LAB (OUTPATIENT)
Dept: FAMILY MEDICINE CLINIC | Facility: CLINIC | Age: 65
End: 2020-12-01

## 2020-12-01 VITALS
HEART RATE: 87 BPM | RESPIRATION RATE: 16 BRPM | WEIGHT: 200 LBS | SYSTOLIC BLOOD PRESSURE: 122 MMHG | DIASTOLIC BLOOD PRESSURE: 90 MMHG | BODY MASS INDEX: 34.15 KG/M2 | TEMPERATURE: 96.6 F | HEIGHT: 64 IN | OXYGEN SATURATION: 95 %

## 2020-12-01 DIAGNOSIS — E03.9 ACQUIRED HYPOTHYROIDISM: ICD-10-CM

## 2020-12-01 DIAGNOSIS — F41.9 ANXIETY: ICD-10-CM

## 2020-12-01 DIAGNOSIS — I10 ESSENTIAL HYPERTENSION: Primary | ICD-10-CM

## 2020-12-01 LAB
ALBUMIN SERPL-MCNC: 4.6 G/DL (ref 3.5–5.2)
ALBUMIN/GLOB SERPL: 1.8 G/DL
ALP SERPL-CCNC: 95 U/L (ref 39–117)
ALT SERPL W P-5'-P-CCNC: 55 U/L (ref 1–33)
ANION GAP SERPL CALCULATED.3IONS-SCNC: 7.2 MMOL/L (ref 5–15)
AST SERPL-CCNC: 31 U/L (ref 1–32)
BASOPHILS # BLD AUTO: 0.05 10*3/MM3 (ref 0–0.2)
BASOPHILS NFR BLD AUTO: 0.9 % (ref 0–1.5)
BILIRUB SERPL-MCNC: 0.3 MG/DL (ref 0–1.2)
BUN SERPL-MCNC: 16 MG/DL (ref 8–23)
BUN/CREAT SERPL: 20.3 (ref 7–25)
CALCIUM SPEC-SCNC: 9.2 MG/DL (ref 8.6–10.5)
CHLORIDE SERPL-SCNC: 104 MMOL/L (ref 98–107)
CO2 SERPL-SCNC: 28.8 MMOL/L (ref 22–29)
CREAT SERPL-MCNC: 0.79 MG/DL (ref 0.57–1)
DEPRECATED RDW RBC AUTO: 44.3 FL (ref 37–54)
EOSINOPHIL # BLD AUTO: 0.12 10*3/MM3 (ref 0–0.4)
EOSINOPHIL NFR BLD AUTO: 2.1 % (ref 0.3–6.2)
ERYTHROCYTE [DISTWIDTH] IN BLOOD BY AUTOMATED COUNT: 13.2 % (ref 12.3–15.4)
GFR SERPL CREATININE-BSD FRML MDRD: 73 ML/MIN/1.73
GLOBULIN UR ELPH-MCNC: 2.5 GM/DL
GLUCOSE SERPL-MCNC: 103 MG/DL (ref 65–99)
HCT VFR BLD AUTO: 42.1 % (ref 34–46.6)
HGB BLD-MCNC: 13.5 G/DL (ref 12–15.9)
IMM GRANULOCYTES # BLD AUTO: 0.02 10*3/MM3 (ref 0–0.05)
IMM GRANULOCYTES NFR BLD AUTO: 0.4 % (ref 0–0.5)
LYMPHOCYTES # BLD AUTO: 1.58 10*3/MM3 (ref 0.7–3.1)
LYMPHOCYTES NFR BLD AUTO: 28.1 % (ref 19.6–45.3)
MCH RBC QN AUTO: 29.3 PG (ref 26.6–33)
MCHC RBC AUTO-ENTMCNC: 32.1 G/DL (ref 31.5–35.7)
MCV RBC AUTO: 91.3 FL (ref 79–97)
MONOCYTES # BLD AUTO: 0.39 10*3/MM3 (ref 0.1–0.9)
MONOCYTES NFR BLD AUTO: 6.9 % (ref 5–12)
NEUTROPHILS NFR BLD AUTO: 3.46 10*3/MM3 (ref 1.7–7)
NEUTROPHILS NFR BLD AUTO: 61.6 % (ref 42.7–76)
NRBC BLD AUTO-RTO: 0 /100 WBC (ref 0–0.2)
PLATELET # BLD AUTO: 188 10*3/MM3 (ref 140–450)
PMV BLD AUTO: 10.9 FL (ref 6–12)
POTASSIUM SERPL-SCNC: 4.4 MMOL/L (ref 3.5–5.2)
PROT SERPL-MCNC: 7.1 G/DL (ref 6–8.5)
RBC # BLD AUTO: 4.61 10*6/MM3 (ref 3.77–5.28)
SODIUM SERPL-SCNC: 140 MMOL/L (ref 136–145)
T4 FREE SERPL-MCNC: 1.31 NG/DL (ref 0.93–1.7)
TSH SERPL DL<=0.05 MIU/L-ACNC: 1.81 UIU/ML (ref 0.27–4.2)
WBC # BLD AUTO: 5.62 10*3/MM3 (ref 3.4–10.8)

## 2020-12-01 PROCEDURE — 84439 ASSAY OF FREE THYROXINE: CPT | Performed by: INTERNAL MEDICINE

## 2020-12-01 PROCEDURE — 84443 ASSAY THYROID STIM HORMONE: CPT | Performed by: INTERNAL MEDICINE

## 2020-12-01 PROCEDURE — 99214 OFFICE O/P EST MOD 30 MIN: CPT | Performed by: INTERNAL MEDICINE

## 2020-12-01 PROCEDURE — 85025 COMPLETE CBC W/AUTO DIFF WBC: CPT | Performed by: INTERNAL MEDICINE

## 2020-12-01 PROCEDURE — 80053 COMPREHEN METABOLIC PANEL: CPT | Performed by: INTERNAL MEDICINE

## 2020-12-01 RX ORDER — AMLODIPINE BESYLATE 5 MG/1
5 TABLET ORAL DAILY
Qty: 30 TABLET | Refills: 12 | Status: SHIPPED | OUTPATIENT
Start: 2020-12-01 | End: 2021-11-30 | Stop reason: SDUPTHER

## 2020-12-01 RX ORDER — ALPRAZOLAM 0.5 MG/1
0.5 TABLET ORAL 2 TIMES DAILY
Qty: 60 TABLET | Refills: 4 | Status: SHIPPED | OUTPATIENT
Start: 2020-12-01 | End: 2021-08-06

## 2020-12-01 RX ORDER — MULTIVIT-MIN/IRON/FOLIC ACID/K 18-600-40
1000 CAPSULE ORAL DAILY
COMMUNITY
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
## 2020-12-15 ENCOUNTER — TELEPHONE (OUTPATIENT)
Dept: FAMILY MEDICINE CLINIC | Facility: CLINIC | Age: 65
End: 2020-12-15

## 2020-12-15 ENCOUNTER — LAB (OUTPATIENT)
Dept: LAB | Facility: HOSPITAL | Age: 65
End: 2020-12-15

## 2020-12-15 DIAGNOSIS — J06.9 UPPER RESPIRATORY TRACT INFECTION, UNSPECIFIED TYPE: Primary | ICD-10-CM

## 2020-12-15 DIAGNOSIS — J06.9 ACUTE RESPIRATORY DISEASE: Primary | ICD-10-CM

## 2020-12-15 PROCEDURE — C9803 HOPD COVID-19 SPEC COLLECT: HCPCS

## 2020-12-15 PROCEDURE — U0004 COV-19 TEST NON-CDC HGH THRU: HCPCS

## 2020-12-15 RX ORDER — ALBUTEROL SULFATE 90 UG/1
2 AEROSOL, METERED RESPIRATORY (INHALATION) EVERY 4 HOURS PRN
Qty: 6.7 G | Refills: 1 | Status: SHIPPED | OUTPATIENT
Start: 2020-12-15 | End: 2021-07-21

## 2020-12-15 NOTE — TELEPHONE ENCOUNTER
PATIENT CALLED AND STATES SHE WAS SEEN ON  12/1/2020 AND DR. VARELA NOTICED SOME WHEEZING IN HER LUNGS. SHE WAS ASKED TO KEEP AN EYE ON IT.  DRAINAGE AND COUGH STARTED YESTERDAY MORNING. TODAY HER THROAT IS SCRATCHY AND HER VOICE IS RASPY.    NO FEVER.  SOMETIMES WHEN SHE IS WALKING AROUND SHE FEELS A LITTLE WINDED.       [facility] [address] [place] [address] - [phone] [facility] [phone]   [phone]    CALL BACK NUMBER [phone]    SHOULD SHE MAKE AN APPOINTMENT TO GET COVID TESTED. SHE WAS AROUND HER GRANDCHILDREN YESTERDAY.

## 2020-12-15 NOTE — TELEPHONE ENCOUNTER
1. Yes would get COVID tested. I sent order to [facility]. They close at 4. Results will be to me tomorrow afternoon/evening  2. Also sent in inhaler.for wheezing/coughing

## 2020-12-16 ENCOUNTER — TELEPHONE (OUTPATIENT)
Dept: FAMILY MEDICINE CLINIC | Facility: CLINIC | Age: 65
End: 2020-12-16

## 2020-12-16 LAB — SARS-COV-2 RNA RESP QL NAA+PROBE: DETECTED

## 2020-12-16 RX ORDER — AZITHROMYCIN 250 MG/1
TABLET, FILM COATED ORAL
Qty: 6 TABLET | Refills: 0 | Status: SHIPPED | OUTPATIENT
Start: 2020-12-16 | End: 2020-12-23

## 2020-12-16 RX ORDER — PREDNISONE 20 MG/1
20 TABLET ORAL 2 TIMES DAILY
Qty: 10 TABLET | Refills: 0 | Status: SHIPPED | OUTPATIENT
Start: 2020-12-16 | End: 2020-12-21

## 2020-12-16 NOTE — TELEPHONE ENCOUNTER
PATIENT IS CALLING IN SHE IS WANTING TO KNOW THE RESULTS OF HER COVID TEST.    CALLBACK NUMBER IS:  [phone]

## 2020-12-16 NOTE — TELEPHONE ENCOUNTER
PATIENT CALLED AND STATES SHE WAS COVID TESTED YESTERDAY AND WANTS TO KNOW IF SOMEONE CAN CALL HER WITH THE RESULTS WHEN KNOWN.    PLEASE CALL [phone]    SHE IS TAKING NORVASC HER BLOOD PRESSURE IS RUNNING HIGH LAST NIGHT /102,   140/97 THIS MORNING PULSE IN LOW 80'S.

## 2020-12-16 NOTE — TELEPHONE ENCOUNTER
Called pt- gave results that COVID ws positive-  Will treat with zpack and prednisone- has started albuterol. Has vD and to start zinc.   will tell kids and  they need to quarantine

## 2020-12-21 ENCOUNTER — TELEPHONE (OUTPATIENT)
Dept: FAMILY MEDICINE CLINIC | Facility: CLINIC | Age: 65
End: 2020-12-21

## 2020-12-21 NOTE — TELEPHONE ENCOUNTER
The steroid is drying her out.  If not have the congestion and body aches- can do regular tylenol and alternate with aleve for fever only symptoms. Ok for gatorade as will replenish the NA and K she loses from sweat and diarrhea, but needs water inbetween them.

## 2020-12-21 NOTE — TELEPHONE ENCOUNTER
PATIENT IS CALLING TO NEEDING  A CALL BACK FROM DR. VARELA OR HER NURSE    PATIENT HAS COVID-19 AND IS HAVING SYMPTOMS ARE HAVING DIARRHEA  YESTERDAY AND SHE DRINK SOME GATORADE AND IT WENT AWAY    PATIENT WOKE UP YESTERDAY WITH A 102. FEVER AND HER  AND GOT HER TYLENOL COLD AND FLU SEVERE AND THAT HELPED WITH THE FEVER    PATIENT WOKE UP AT 3:30 MORNING WITH SHIVERS AND WOKE UP AT 8:00AM WITH SHIVERS AS WELL AND SHE DRUNK GATORADE AND WATER UNTIL THEY STOPPED    PATIENT IS WANTING TO KNOW IF THE TYLENOL COLD SEVERE DRYING HER OUT PATIENT IS VERY THIRSTY AND HAVING DRY MOUTH    PATIENT TAKES HER LAST STEROID THIS MORNING AND TOOK HER LAST ANTIBIOTIC YESTERDAY    PATIENT IS NOT HAVING ALL THE SYMPTOMS THAT IS LISTED ON THE TYLENOL SEVERE BOTTLE AND IS IT OK TO DRINK THAT MUCH GATORADE    PATIENT HAS A FEVER THIS MORNING 101.8 AND CAN SHE TAKE ALEVE      PLEASE CONTACT PATIENT @[phone]

## 2020-12-23 ENCOUNTER — HOSPITAL ENCOUNTER (INPATIENT)
Facility: HOSPITAL | Age: 65
LOS: 6 days | Discharge: HOME | End: 2020-12-29
Attending: EMERGENCY MEDICINE | Admitting: INTERNAL MEDICINE

## 2020-12-23 ENCOUNTER — APPOINTMENT (OUTPATIENT)
Dept: GENERAL RADIOLOGY | Facility: HOSPITAL | Age: 65
End: 2020-12-23

## 2020-12-23 DIAGNOSIS — R09.02 HYPOXIA: ICD-10-CM

## 2020-12-23 DIAGNOSIS — R06.00 DYSPNEA, UNSPECIFIED TYPE: ICD-10-CM

## 2020-12-23 DIAGNOSIS — U07.1 COVID-19 VIRUS INFECTION: Primary | ICD-10-CM

## 2020-12-23 PROBLEM — F10.10 ALCOHOL ABUSE: Chronic | Status: ACTIVE | Noted: 2020-12-23

## 2020-12-23 PROBLEM — E66.9 OBESITY: Chronic | Status: ACTIVE | Noted: 2020-12-23

## 2020-12-23 LAB
ANION GAP SERPL CALCULATED.3IONS-SCNC: 11 MMOL/L (ref 5–15)
BASOPHILS # BLD AUTO: 0 10*3/MM3 (ref 0–0.2)
BASOPHILS NFR BLD AUTO: 0.1 % (ref 0–1.5)
BUN SERPL-MCNC: 9 MG/DL (ref 8–23)
BUN/CREAT SERPL: 12.3 (ref 7–25)
CALCIUM SPEC-SCNC: 9 MG/DL (ref 8.6–10.5)
CHLORIDE SERPL-SCNC: 95 MMOL/L (ref 98–107)
CO2 SERPL-SCNC: 28 MMOL/L (ref 22–29)
CREAT SERPL-MCNC: 0.73 MG/DL (ref 0.57–1)
CRP SERPL-MCNC: 16.58 MG/DL (ref 0–0.5)
D DIMER PPP FEU-MCNC: 0.59 MG/L (FEU) (ref 0–0.59)
DEPRECATED RDW RBC AUTO: 43.8 FL (ref 37–54)
EOSINOPHIL # BLD AUTO: 0 10*3/MM3 (ref 0–0.4)
EOSINOPHIL NFR BLD AUTO: 0 % (ref 0.3–6.2)
ERYTHROCYTE [DISTWIDTH] IN BLOOD BY AUTOMATED COUNT: 14.4 % (ref 12.3–15.4)
FERRITIN SERPL-MCNC: 366.4 NG/ML (ref 13–150)
GFR SERPL CREATININE-BSD FRML MDRD: 80 ML/MIN/1.73
GLUCOSE SERPL-MCNC: 141 MG/DL (ref 65–99)
HCT VFR BLD AUTO: 40 % (ref 34–46.6)
HGB BLD-MCNC: 13.2 G/DL (ref 12–15.9)
HOLD SPECIMEN: NORMAL
LYMPHOCYTES # BLD AUTO: 0.7 10*3/MM3 (ref 0.7–3.1)
LYMPHOCYTES NFR BLD AUTO: 9.2 % (ref 19.6–45.3)
MAGNESIUM SERPL-MCNC: 2.1 MG/DL (ref 1.6–2.4)
MCH RBC QN AUTO: 28.6 PG (ref 26.6–33)
MCHC RBC AUTO-ENTMCNC: 33.1 G/DL (ref 31.5–35.7)
MCV RBC AUTO: 86.4 FL (ref 79–97)
MONOCYTES # BLD AUTO: 0.2 10*3/MM3 (ref 0.1–0.9)
MONOCYTES NFR BLD AUTO: 2.8 % (ref 5–12)
NEUTROPHILS NFR BLD AUTO: 6.5 10*3/MM3 (ref 1.7–7)
NEUTROPHILS NFR BLD AUTO: 87.9 % (ref 42.7–76)
NRBC BLD AUTO-RTO: 0.1 /100 WBC (ref 0–0.2)
NT-PROBNP SERPL-MCNC: 133 PG/ML (ref 0–900)
PLATELET # BLD AUTO: 134 10*3/MM3 (ref 140–450)
PMV BLD AUTO: 8.6 FL (ref 6–12)
POTASSIUM SERPL-SCNC: 3.9 MMOL/L (ref 3.5–5.2)
POTASSIUM SERPL-SCNC: 4.3 MMOL/L (ref 3.5–5.2)
PROCALCITONIN SERPL-MCNC: 0.26 NG/ML (ref 0–0.25)
QT INTERVAL: 340 MS
RBC # BLD AUTO: 4.63 10*6/MM3 (ref 3.77–5.28)
SODIUM SERPL-SCNC: 134 MMOL/L (ref 136–145)
TROPONIN T SERPL-MCNC: <0.01 NG/ML (ref 0–0.03)
TSH SERPL DL<=0.05 MIU/L-ACNC: 0.75 UIU/ML (ref 0.27–4.2)
WBC # BLD AUTO: 7.4 10*3/MM3 (ref 3.4–10.8)
WHOLE BLOOD HOLD SPECIMEN: NORMAL

## 2020-12-23 PROCEDURE — 84132 ASSAY OF SERUM POTASSIUM: CPT | Performed by: PHYSICIAN ASSISTANT

## 2020-12-23 PROCEDURE — 84484 ASSAY OF TROPONIN QUANT: CPT | Performed by: EMERGENCY MEDICINE

## 2020-12-23 PROCEDURE — 83520 IMMUNOASSAY QUANT NOS NONAB: CPT | Performed by: PHYSICIAN ASSISTANT

## 2020-12-23 PROCEDURE — 94799 UNLISTED PULMONARY SVC/PX: CPT

## 2020-12-23 PROCEDURE — 84443 ASSAY THYROID STIM HORMONE: CPT | Performed by: PHYSICIAN ASSISTANT

## 2020-12-23 PROCEDURE — 82728 ASSAY OF FERRITIN: CPT | Performed by: PHYSICIAN ASSISTANT

## 2020-12-23 PROCEDURE — 80048 BASIC METABOLIC PNL TOTAL CA: CPT | Performed by: EMERGENCY MEDICINE

## 2020-12-23 PROCEDURE — 83735 ASSAY OF MAGNESIUM: CPT | Performed by: PHYSICIAN ASSISTANT

## 2020-12-23 PROCEDURE — G0378 HOSPITAL OBSERVATION PER HR: HCPCS

## 2020-12-23 PROCEDURE — 83880 ASSAY OF NATRIURETIC PEPTIDE: CPT | Performed by: EMERGENCY MEDICINE

## 2020-12-23 PROCEDURE — 85025 COMPLETE CBC W/AUTO DIFF WBC: CPT | Performed by: EMERGENCY MEDICINE

## 2020-12-23 PROCEDURE — 85379 FIBRIN DEGRADATION QUANT: CPT | Performed by: PHYSICIAN ASSISTANT

## 2020-12-23 PROCEDURE — 25010000002 ENOXAPARIN PER 10 MG: Performed by: PHYSICIAN ASSISTANT

## 2020-12-23 PROCEDURE — 99223 1ST HOSP IP/OBS HIGH 75: CPT | Performed by: INTERNAL MEDICINE

## 2020-12-23 PROCEDURE — 94640 AIRWAY INHALATION TREATMENT: CPT

## 2020-12-23 PROCEDURE — 84145 PROCALCITONIN (PCT): CPT | Performed by: PHYSICIAN ASSISTANT

## 2020-12-23 PROCEDURE — 86140 C-REACTIVE PROTEIN: CPT | Performed by: PHYSICIAN ASSISTANT

## 2020-12-23 PROCEDURE — 93005 ELECTROCARDIOGRAM TRACING: CPT | Performed by: EMERGENCY MEDICINE

## 2020-12-23 PROCEDURE — 25010000002 DEXAMETHASONE SODIUM PHOSPHATE 10 MG/ML SOLUTION: Performed by: EMERGENCY MEDICINE

## 2020-12-23 PROCEDURE — 99284 EMERGENCY DEPT VISIT MOD MDM: CPT

## 2020-12-23 PROCEDURE — 71045 X-RAY EXAM CHEST 1 VIEW: CPT

## 2020-12-23 RX ORDER — ASCORBIC ACID 500 MG
500 TABLET ORAL 2 TIMES DAILY
Status: DISCONTINUED | OUTPATIENT
Start: 2020-12-23 | End: 2020-12-29 | Stop reason: HOSPADM

## 2020-12-23 RX ORDER — DEXAMETHASONE SODIUM PHOSPHATE 10 MG/ML
6 INJECTION, SOLUTION INTRAMUSCULAR; INTRAVENOUS ONCE
Status: COMPLETED | OUTPATIENT
Start: 2020-12-23 | End: 2020-12-23

## 2020-12-23 RX ORDER — LORAZEPAM 2 MG/ML
0.5 INJECTION INTRAMUSCULAR
Status: DISCONTINUED | OUTPATIENT
Start: 2020-12-23 | End: 2020-12-29 | Stop reason: HOSPADM

## 2020-12-23 RX ORDER — LORAZEPAM 2 MG/ML
1 INJECTION INTRAMUSCULAR
Status: DISCONTINUED | OUTPATIENT
Start: 2020-12-23 | End: 2020-12-29 | Stop reason: HOSPADM

## 2020-12-23 RX ORDER — ALBUTEROL SULFATE 90 UG/1
2 AEROSOL, METERED RESPIRATORY (INHALATION)
Status: DISCONTINUED | OUTPATIENT
Start: 2020-12-23 | End: 2020-12-29 | Stop reason: HOSPADM

## 2020-12-23 RX ORDER — SODIUM CHLORIDE 0.9 % (FLUSH) 0.9 %
10 SYRINGE (ML) INJECTION AS NEEDED
Status: DISCONTINUED | OUTPATIENT
Start: 2020-12-23 | End: 2020-12-29 | Stop reason: HOSPADM

## 2020-12-23 RX ORDER — DEXAMETHASONE 6 MG/1
6 TABLET ORAL
Status: DISCONTINUED | OUTPATIENT
Start: 2020-12-24 | End: 2020-12-24

## 2020-12-23 RX ORDER — CALCIUM GLUCONATE 20 MG/ML
1 INJECTION, SOLUTION INTRAVENOUS AS NEEDED
Status: DISCONTINUED | OUTPATIENT
Start: 2020-12-23 | End: 2020-12-29 | Stop reason: HOSPADM

## 2020-12-23 RX ORDER — ONDANSETRON 4 MG/1
4 TABLET, FILM COATED ORAL EVERY 6 HOURS PRN
Status: DISCONTINUED | OUTPATIENT
Start: 2020-12-23 | End: 2020-12-29 | Stop reason: HOSPADM

## 2020-12-23 RX ORDER — AMLODIPINE BESYLATE 5 MG/1
5 TABLET ORAL DAILY
Status: DISCONTINUED | OUTPATIENT
Start: 2020-12-23 | End: 2020-12-29 | Stop reason: HOSPADM

## 2020-12-23 RX ORDER — ALPRAZOLAM 0.5 MG/1
0.5 TABLET ORAL 2 TIMES DAILY
Status: DISCONTINUED | OUTPATIENT
Start: 2020-12-23 | End: 2020-12-29 | Stop reason: HOSPADM

## 2020-12-23 RX ORDER — SODIUM CHLORIDE 0.9 % (FLUSH) 0.9 %
10 SYRINGE (ML) INJECTION EVERY 12 HOURS SCHEDULED
Status: DISCONTINUED | OUTPATIENT
Start: 2020-12-23 | End: 2020-12-29 | Stop reason: HOSPADM

## 2020-12-23 RX ORDER — ONDANSETRON 2 MG/ML
4 INJECTION INTRAMUSCULAR; INTRAVENOUS EVERY 6 HOURS PRN
Status: DISCONTINUED | OUTPATIENT
Start: 2020-12-23 | End: 2020-12-29 | Stop reason: HOSPADM

## 2020-12-23 RX ORDER — ACETAMINOPHEN 650 MG/1
650 SUPPOSITORY RECTAL EVERY 4 HOURS PRN
Status: DISCONTINUED | OUTPATIENT
Start: 2020-12-23 | End: 2020-12-29 | Stop reason: HOSPADM

## 2020-12-23 RX ORDER — MAGNESIUM SULFATE HEPTAHYDRATE 40 MG/ML
2 INJECTION, SOLUTION INTRAVENOUS AS NEEDED
Status: DISCONTINUED | OUTPATIENT
Start: 2020-12-23 | End: 2020-12-29 | Stop reason: HOSPADM

## 2020-12-23 RX ORDER — NITROGLYCERIN 0.4 MG/1
0.4 TABLET SUBLINGUAL
Status: DISCONTINUED | OUTPATIENT
Start: 2020-12-23 | End: 2020-12-29 | Stop reason: HOSPADM

## 2020-12-23 RX ORDER — ACETAMINOPHEN 325 MG/1
650 TABLET ORAL EVERY 4 HOURS PRN
Status: DISCONTINUED | OUTPATIENT
Start: 2020-12-23 | End: 2020-12-29 | Stop reason: HOSPADM

## 2020-12-23 RX ORDER — PANTOPRAZOLE SODIUM 40 MG/1
40 TABLET, DELAYED RELEASE ORAL DAILY
Status: DISCONTINUED | OUTPATIENT
Start: 2020-12-23 | End: 2020-12-29 | Stop reason: HOSPADM

## 2020-12-23 RX ORDER — GUAIFENESIN 600 MG/1
1200 TABLET, EXTENDED RELEASE ORAL EVERY 12 HOURS SCHEDULED
Status: DISCONTINUED | OUTPATIENT
Start: 2020-12-23 | End: 2020-12-29 | Stop reason: HOSPADM

## 2020-12-23 RX ORDER — CALCIUM GLUCONATE 20 MG/ML
2 INJECTION, SOLUTION INTRAVENOUS AS NEEDED
Status: DISCONTINUED | OUTPATIENT
Start: 2020-12-23 | End: 2020-12-29 | Stop reason: HOSPADM

## 2020-12-23 RX ORDER — POTASSIUM CHLORIDE 1.5 G/1.77G
40 POWDER, FOR SOLUTION ORAL AS NEEDED
Status: DISCONTINUED | OUTPATIENT
Start: 2020-12-23 | End: 2020-12-29 | Stop reason: HOSPADM

## 2020-12-23 RX ORDER — BENZONATATE 100 MG/1
100 CAPSULE ORAL 3 TIMES DAILY PRN
Status: DISCONTINUED | OUTPATIENT
Start: 2020-12-23 | End: 2020-12-29 | Stop reason: HOSPADM

## 2020-12-23 RX ORDER — CHOLECALCIFEROL (VITAMIN D3) 125 MCG
10 CAPSULE ORAL NIGHTLY
Status: DISCONTINUED | OUTPATIENT
Start: 2020-12-23 | End: 2020-12-29 | Stop reason: HOSPADM

## 2020-12-23 RX ORDER — ZINC SULFATE 50(220)MG
220 CAPSULE ORAL DAILY
Status: DISCONTINUED | OUTPATIENT
Start: 2020-12-23 | End: 2020-12-29 | Stop reason: HOSPADM

## 2020-12-23 RX ORDER — POTASSIUM CHLORIDE 20 MEQ/1
40 TABLET, EXTENDED RELEASE ORAL AS NEEDED
Status: DISCONTINUED | OUTPATIENT
Start: 2020-12-23 | End: 2020-12-29 | Stop reason: HOSPADM

## 2020-12-23 RX ORDER — LEVOTHYROXINE SODIUM 0.05 MG/1
50 TABLET ORAL DAILY
Status: DISCONTINUED | OUTPATIENT
Start: 2020-12-23 | End: 2020-12-29 | Stop reason: HOSPADM

## 2020-12-23 RX ORDER — CHOLECALCIFEROL (VITAMIN D3) 125 MCG
5 CAPSULE ORAL NIGHTLY PRN
Status: DISCONTINUED | OUTPATIENT
Start: 2020-12-23 | End: 2020-12-29 | Stop reason: HOSPADM

## 2020-12-23 RX ORDER — LORAZEPAM 0.5 MG/1
0.5 TABLET ORAL
Status: DISCONTINUED | OUTPATIENT
Start: 2020-12-23 | End: 2020-12-29 | Stop reason: HOSPADM

## 2020-12-23 RX ORDER — ACETAMINOPHEN 160 MG/5ML
650 SOLUTION ORAL EVERY 4 HOURS PRN
Status: DISCONTINUED | OUTPATIENT
Start: 2020-12-23 | End: 2020-12-29 | Stop reason: HOSPADM

## 2020-12-23 RX ORDER — MAGNESIUM SULFATE HEPTAHYDRATE 40 MG/ML
4 INJECTION, SOLUTION INTRAVENOUS AS NEEDED
Status: DISCONTINUED | OUTPATIENT
Start: 2020-12-23 | End: 2020-12-29 | Stop reason: HOSPADM

## 2020-12-23 RX ORDER — LORAZEPAM 1 MG/1
1 TABLET ORAL
Status: DISCONTINUED | OUTPATIENT
Start: 2020-12-23 | End: 2020-12-29 | Stop reason: HOSPADM

## 2020-12-23 RX ADMIN — Medication 10 ML: at 08:02

## 2020-12-23 RX ADMIN — PANTOPRAZOLE SODIUM 40 MG: 40 TABLET, DELAYED RELEASE ORAL at 08:01

## 2020-12-23 RX ADMIN — GUAIFENESIN 1200 MG: 600 TABLET, EXTENDED RELEASE ORAL at 09:31

## 2020-12-23 RX ADMIN — DEXAMETHASONE SODIUM PHOSPHATE 6 MG: 10 INJECTION, SOLUTION INTRAMUSCULAR; INTRAVENOUS at 01:47

## 2020-12-23 RX ADMIN — Medication 5000 UNITS: at 08:01

## 2020-12-23 RX ADMIN — ALPRAZOLAM 0.5 MG: 0.5 TABLET ORAL at 08:01

## 2020-12-23 RX ADMIN — LEVOTHYROXINE SODIUM 50 MCG: 0.05 TABLET ORAL at 08:02

## 2020-12-23 RX ADMIN — OXYCODONE HYDROCHLORIDE AND ACETAMINOPHEN 500 MG: 500 TABLET ORAL at 08:02

## 2020-12-23 RX ADMIN — ZINC SULFATE 220 MG (50 MG) CAPSULE 220 MG: CAPSULE at 08:02

## 2020-12-23 RX ADMIN — ALBUTEROL SULFATE 2 PUFF: 108 AEROSOL, METERED RESPIRATORY (INHALATION) at 15:01

## 2020-12-23 RX ADMIN — GUAIFENESIN 1200 MG: 600 TABLET, EXTENDED RELEASE ORAL at 20:14

## 2020-12-23 RX ADMIN — AMLODIPINE BESYLATE 5 MG: 5 TABLET ORAL at 08:01

## 2020-12-23 RX ADMIN — OXYCODONE HYDROCHLORIDE AND ACETAMINOPHEN 500 MG: 500 TABLET ORAL at 20:14

## 2020-12-23 RX ADMIN — ALPRAZOLAM 0.5 MG: 0.5 TABLET ORAL at 21:59

## 2020-12-23 RX ADMIN — Medication 10 MG: at 20:15

## 2020-12-23 RX ADMIN — ALBUTEROL SULFATE 2 PUFF: 108 AEROSOL, METERED RESPIRATORY (INHALATION) at 07:37

## 2020-12-23 RX ADMIN — ENOXAPARIN SODIUM 40 MG: 40 INJECTION SUBCUTANEOUS at 15:48

## 2020-12-24 LAB
ALBUMIN SERPL-MCNC: 3.2 G/DL (ref 3.5–5.2)
ALBUMIN/GLOB SERPL: 1.1 G/DL
ALP SERPL-CCNC: 84 U/L (ref 39–117)
ALT SERPL W P-5'-P-CCNC: 76 U/L (ref 1–33)
ANION GAP SERPL CALCULATED.3IONS-SCNC: 10 MMOL/L (ref 5–15)
AST SERPL-CCNC: 37 U/L (ref 1–32)
BASOPHILS # BLD AUTO: 0 10*3/MM3 (ref 0–0.2)
BASOPHILS NFR BLD AUTO: 0.4 % (ref 0–1.5)
BILIRUB SERPL-MCNC: 0.2 MG/DL (ref 0–1.2)
BUN SERPL-MCNC: 13 MG/DL (ref 8–23)
BUN/CREAT SERPL: 19.4 (ref 7–25)
CALCIUM SPEC-SCNC: 8.3 MG/DL (ref 8.6–10.5)
CHLORIDE SERPL-SCNC: 101 MMOL/L (ref 98–107)
CO2 SERPL-SCNC: 26 MMOL/L (ref 22–29)
CREAT SERPL-MCNC: 0.67 MG/DL (ref 0.57–1)
CRP SERPL-MCNC: 11.17 MG/DL (ref 0–0.5)
D DIMER PPP FEU-MCNC: 0.56 MG/L (FEU) (ref 0–0.59)
DEPRECATED RDW RBC AUTO: 45.1 FL (ref 37–54)
EOSINOPHIL # BLD AUTO: 0 10*3/MM3 (ref 0–0.4)
EOSINOPHIL NFR BLD AUTO: 0.2 % (ref 0.3–6.2)
ERYTHROCYTE [DISTWIDTH] IN BLOOD BY AUTOMATED COUNT: 14.5 % (ref 12.3–15.4)
FERRITIN SERPL-MCNC: 375.3 NG/ML (ref 13–150)
GFR SERPL CREATININE-BSD FRML MDRD: 88 ML/MIN/1.73
GLOBULIN UR ELPH-MCNC: 2.8 GM/DL
GLUCOSE SERPL-MCNC: 114 MG/DL (ref 65–99)
HCT VFR BLD AUTO: 38.8 % (ref 34–46.6)
HGB BLD-MCNC: 12.7 G/DL (ref 12–15.9)
LYMPHOCYTES # BLD AUTO: 1.4 10*3/MM3 (ref 0.7–3.1)
LYMPHOCYTES NFR BLD AUTO: 14.7 % (ref 19.6–45.3)
MCH RBC QN AUTO: 29 PG (ref 26.6–33)
MCHC RBC AUTO-ENTMCNC: 32.7 G/DL (ref 31.5–35.7)
MCV RBC AUTO: 88.7 FL (ref 79–97)
MONOCYTES # BLD AUTO: 0.3 10*3/MM3 (ref 0.1–0.9)
MONOCYTES NFR BLD AUTO: 3.5 % (ref 5–12)
NEUTROPHILS NFR BLD AUTO: 7.6 10*3/MM3 (ref 1.7–7)
NEUTROPHILS NFR BLD AUTO: 81.2 % (ref 42.7–76)
NRBC BLD AUTO-RTO: 0.3 /100 WBC (ref 0–0.2)
PLATELET # BLD AUTO: 169 10*3/MM3 (ref 140–450)
PMV BLD AUTO: 9.3 FL (ref 6–12)
POTASSIUM SERPL-SCNC: 3.9 MMOL/L (ref 3.5–5.2)
PROCALCITONIN SERPL-MCNC: 0.2 NG/ML (ref 0–0.25)
PROT SERPL-MCNC: 6 G/DL (ref 6–8.5)
RBC # BLD AUTO: 4.37 10*6/MM3 (ref 3.77–5.28)
RBC MORPH BLD: NORMAL
SMALL PLATELETS BLD QL SMEAR: ADEQUATE
SODIUM SERPL-SCNC: 137 MMOL/L (ref 136–145)
WBC # BLD AUTO: 9.4 10*3/MM3 (ref 3.4–10.8)
WBC MORPH BLD: NORMAL

## 2020-12-24 PROCEDURE — 84145 PROCALCITONIN (PCT): CPT | Performed by: PHYSICIAN ASSISTANT

## 2020-12-24 PROCEDURE — 85379 FIBRIN DEGRADATION QUANT: CPT | Performed by: PHYSICIAN ASSISTANT

## 2020-12-24 PROCEDURE — 86140 C-REACTIVE PROTEIN: CPT | Performed by: PHYSICIAN ASSISTANT

## 2020-12-24 PROCEDURE — 80053 COMPREHEN METABOLIC PANEL: CPT | Performed by: PHYSICIAN ASSISTANT

## 2020-12-24 PROCEDURE — 94799 UNLISTED PULMONARY SVC/PX: CPT

## 2020-12-24 PROCEDURE — 82728 ASSAY OF FERRITIN: CPT | Performed by: PHYSICIAN ASSISTANT

## 2020-12-24 PROCEDURE — 85007 BL SMEAR W/DIFF WBC COUNT: CPT | Performed by: PHYSICIAN ASSISTANT

## 2020-12-24 PROCEDURE — 25010000002 ENOXAPARIN PER 10 MG: Performed by: PHYSICIAN ASSISTANT

## 2020-12-24 PROCEDURE — XW033E5 INTRODUCTION OF REMDESIVIR ANTI-INFECTIVE INTO PERIPHERAL VEIN, PERCUTANEOUS APPROACH, NEW TECHNOLOGY GROUP 5: ICD-10-PCS | Performed by: INTERNAL MEDICINE

## 2020-12-24 PROCEDURE — 85025 COMPLETE CBC W/AUTO DIFF WBC: CPT | Performed by: PHYSICIAN ASSISTANT

## 2020-12-24 PROCEDURE — 99233 SBSQ HOSP IP/OBS HIGH 50: CPT | Performed by: INTERNAL MEDICINE

## 2020-12-24 RX ORDER — DEXAMETHASONE 6 MG/1
6 TABLET ORAL EVERY 8 HOURS SCHEDULED
Status: DISCONTINUED | OUTPATIENT
Start: 2020-12-24 | End: 2020-12-27

## 2020-12-24 RX ORDER — AZITHROMYCIN 250 MG/1
500 TABLET, FILM COATED ORAL DAILY
Status: COMPLETED | OUTPATIENT
Start: 2020-12-24 | End: 2020-12-28

## 2020-12-24 RX ADMIN — ALBUTEROL SULFATE 2 PUFF: 108 AEROSOL, METERED RESPIRATORY (INHALATION) at 06:58

## 2020-12-24 RX ADMIN — DEXAMETHASONE 6 MG: 6 TABLET ORAL at 22:04

## 2020-12-24 RX ADMIN — ALBUTEROL SULFATE 2 PUFF: 108 AEROSOL, METERED RESPIRATORY (INHALATION) at 10:31

## 2020-12-24 RX ADMIN — ALPRAZOLAM 0.5 MG: 0.5 TABLET ORAL at 22:04

## 2020-12-24 RX ADMIN — AZITHROMYCIN DIHYDRATE 500 MG: 250 TABLET, FILM COATED ORAL at 11:25

## 2020-12-24 RX ADMIN — SODIUM CHLORIDE 200 MG: 9 INJECTION, SOLUTION INTRAVENOUS at 13:56

## 2020-12-24 RX ADMIN — ZINC SULFATE 220 MG (50 MG) CAPSULE 220 MG: CAPSULE at 08:40

## 2020-12-24 RX ADMIN — Medication 10 ML: at 22:02

## 2020-12-24 RX ADMIN — DEXAMETHASONE 6 MG: 6 TABLET ORAL at 13:56

## 2020-12-24 RX ADMIN — DEXAMETHASONE 6 MG: 6 TABLET ORAL at 08:40

## 2020-12-24 RX ADMIN — GUAIFENESIN 1200 MG: 600 TABLET, EXTENDED RELEASE ORAL at 22:03

## 2020-12-24 RX ADMIN — ALBUTEROL SULFATE 2 PUFF: 108 AEROSOL, METERED RESPIRATORY (INHALATION) at 14:34

## 2020-12-24 RX ADMIN — ENOXAPARIN SODIUM 40 MG: 40 INJECTION SUBCUTANEOUS at 15:18

## 2020-12-24 RX ADMIN — ALBUTEROL SULFATE 2 PUFF: 108 AEROSOL, METERED RESPIRATORY (INHALATION) at 17:28

## 2020-12-24 RX ADMIN — OXYCODONE HYDROCHLORIDE AND ACETAMINOPHEN 500 MG: 500 TABLET ORAL at 08:40

## 2020-12-24 RX ADMIN — LEVOTHYROXINE SODIUM 50 MCG: 0.05 TABLET ORAL at 08:40

## 2020-12-24 RX ADMIN — Medication 10 ML: at 08:40

## 2020-12-24 RX ADMIN — Medication 10 MG: at 22:03

## 2020-12-24 RX ADMIN — AMLODIPINE BESYLATE 5 MG: 5 TABLET ORAL at 08:40

## 2020-12-24 RX ADMIN — GUAIFENESIN 1200 MG: 600 TABLET, EXTENDED RELEASE ORAL at 08:40

## 2020-12-24 RX ADMIN — Medication 10 ML: at 05:25

## 2020-12-24 RX ADMIN — ALPRAZOLAM 0.5 MG: 0.5 TABLET ORAL at 08:40

## 2020-12-24 RX ADMIN — Medication 5000 UNITS: at 08:40

## 2020-12-24 RX ADMIN — OXYCODONE HYDROCHLORIDE AND ACETAMINOPHEN 500 MG: 500 TABLET ORAL at 22:03

## 2020-12-24 RX ADMIN — PANTOPRAZOLE SODIUM 40 MG: 40 TABLET, DELAYED RELEASE ORAL at 08:40

## 2020-12-25 LAB
ALBUMIN SERPL-MCNC: 3.4 G/DL (ref 3.5–5.2)
ALBUMIN/GLOB SERPL: 1.1 G/DL
ALP SERPL-CCNC: 95 U/L (ref 39–117)
ALT SERPL W P-5'-P-CCNC: 93 U/L (ref 1–33)
ANION GAP SERPL CALCULATED.3IONS-SCNC: 10 MMOL/L (ref 5–15)
AST SERPL-CCNC: 49 U/L (ref 1–32)
BASOPHILS # BLD AUTO: 0 10*3/MM3 (ref 0–0.2)
BASOPHILS NFR BLD AUTO: 0.1 % (ref 0–1.5)
BILIRUB CONJ SERPL-MCNC: <0.2 MG/DL (ref 0–0.3)
BILIRUB SERPL-MCNC: 0.2 MG/DL (ref 0–1.2)
BUN SERPL-MCNC: 13 MG/DL (ref 8–23)
BUN/CREAT SERPL: 21.7 (ref 7–25)
CALCIUM SPEC-SCNC: 8.6 MG/DL (ref 8.6–10.5)
CHLORIDE SERPL-SCNC: 103 MMOL/L (ref 98–107)
CO2 SERPL-SCNC: 24 MMOL/L (ref 22–29)
CREAT SERPL-MCNC: 0.6 MG/DL (ref 0.57–1)
CRP SERPL-MCNC: 9.13 MG/DL (ref 0–0.5)
D DIMER PPP FEU-MCNC: 0.49 MG/L (FEU) (ref 0–0.59)
DEPRECATED RDW RBC AUTO: 43.8 FL (ref 37–54)
EOSINOPHIL # BLD AUTO: 0 10*3/MM3 (ref 0–0.4)
EOSINOPHIL NFR BLD AUTO: 0 % (ref 0.3–6.2)
ERYTHROCYTE [DISTWIDTH] IN BLOOD BY AUTOMATED COUNT: 14.3 % (ref 12.3–15.4)
FERRITIN SERPL-MCNC: 375.4 NG/ML (ref 13–150)
GFR SERPL CREATININE-BSD FRML MDRD: 100 ML/MIN/1.73
GLOBULIN UR ELPH-MCNC: 3 GM/DL
GLUCOSE BLDC GLUCOMTR-MCNC: 224 MG/DL (ref 70–105)
GLUCOSE SERPL-MCNC: 188 MG/DL (ref 65–99)
HCT VFR BLD AUTO: 37.3 % (ref 34–46.6)
HGB BLD-MCNC: 12.3 G/DL (ref 12–15.9)
LYMPHOCYTES # BLD AUTO: 0.5 10*3/MM3 (ref 0.7–3.1)
LYMPHOCYTES NFR BLD AUTO: 9.4 % (ref 19.6–45.3)
MCH RBC QN AUTO: 28.9 PG (ref 26.6–33)
MCHC RBC AUTO-ENTMCNC: 32.9 G/DL (ref 31.5–35.7)
MCV RBC AUTO: 88 FL (ref 79–97)
MONOCYTES # BLD AUTO: 0.2 10*3/MM3 (ref 0.1–0.9)
MONOCYTES NFR BLD AUTO: 4 % (ref 5–12)
NEUTROPHILS NFR BLD AUTO: 4.7 10*3/MM3 (ref 1.7–7)
NEUTROPHILS NFR BLD AUTO: 86.5 % (ref 42.7–76)
NRBC BLD AUTO-RTO: 0.1 /100 WBC (ref 0–0.2)
PLATELET # BLD AUTO: 172 10*3/MM3 (ref 140–450)
PMV BLD AUTO: 8.6 FL (ref 6–12)
POTASSIUM SERPL-SCNC: 4 MMOL/L (ref 3.5–5.2)
PROCALCITONIN SERPL-MCNC: 0.12 NG/ML (ref 0–0.25)
PROT SERPL-MCNC: 6.4 G/DL (ref 6–8.5)
RBC # BLD AUTO: 4.24 10*6/MM3 (ref 3.77–5.28)
SODIUM SERPL-SCNC: 137 MMOL/L (ref 136–145)
WBC # BLD AUTO: 5.4 10*3/MM3 (ref 3.4–10.8)

## 2020-12-25 PROCEDURE — 99233 SBSQ HOSP IP/OBS HIGH 50: CPT | Performed by: INTERNAL MEDICINE

## 2020-12-25 PROCEDURE — 85025 COMPLETE CBC W/AUTO DIFF WBC: CPT | Performed by: PHYSICIAN ASSISTANT

## 2020-12-25 PROCEDURE — 94799 UNLISTED PULMONARY SVC/PX: CPT

## 2020-12-25 PROCEDURE — 85379 FIBRIN DEGRADATION QUANT: CPT | Performed by: PHYSICIAN ASSISTANT

## 2020-12-25 PROCEDURE — 84145 PROCALCITONIN (PCT): CPT | Performed by: PHYSICIAN ASSISTANT

## 2020-12-25 PROCEDURE — 82248 BILIRUBIN DIRECT: CPT | Performed by: INTERNAL MEDICINE

## 2020-12-25 PROCEDURE — 86140 C-REACTIVE PROTEIN: CPT | Performed by: PHYSICIAN ASSISTANT

## 2020-12-25 PROCEDURE — 82962 GLUCOSE BLOOD TEST: CPT

## 2020-12-25 PROCEDURE — 80053 COMPREHEN METABOLIC PANEL: CPT | Performed by: PHYSICIAN ASSISTANT

## 2020-12-25 PROCEDURE — 82728 ASSAY OF FERRITIN: CPT | Performed by: PHYSICIAN ASSISTANT

## 2020-12-25 PROCEDURE — 25010000002 ENOXAPARIN PER 10 MG: Performed by: PHYSICIAN ASSISTANT

## 2020-12-25 RX ADMIN — AMLODIPINE BESYLATE 5 MG: 5 TABLET ORAL at 08:55

## 2020-12-25 RX ADMIN — OXYCODONE HYDROCHLORIDE AND ACETAMINOPHEN 500 MG: 500 TABLET ORAL at 21:58

## 2020-12-25 RX ADMIN — ZINC SULFATE 220 MG (50 MG) CAPSULE 220 MG: CAPSULE at 08:56

## 2020-12-25 RX ADMIN — LEVOTHYROXINE SODIUM 50 MCG: 0.05 TABLET ORAL at 08:56

## 2020-12-25 RX ADMIN — SODIUM CHLORIDE 100 MG: 9 INJECTION, SOLUTION INTRAVENOUS at 11:55

## 2020-12-25 RX ADMIN — GUAIFENESIN 1200 MG: 600 TABLET, EXTENDED RELEASE ORAL at 08:56

## 2020-12-25 RX ADMIN — ALBUTEROL SULFATE 2 PUFF: 108 AEROSOL, METERED RESPIRATORY (INHALATION) at 07:04

## 2020-12-25 RX ADMIN — OXYCODONE HYDROCHLORIDE AND ACETAMINOPHEN 500 MG: 500 TABLET ORAL at 08:56

## 2020-12-25 RX ADMIN — DEXAMETHASONE 6 MG: 6 TABLET ORAL at 14:43

## 2020-12-25 RX ADMIN — ALBUTEROL SULFATE 2 PUFF: 108 AEROSOL, METERED RESPIRATORY (INHALATION) at 15:00

## 2020-12-25 RX ADMIN — ALBUTEROL SULFATE 2 PUFF: 108 AEROSOL, METERED RESPIRATORY (INHALATION) at 11:00

## 2020-12-25 RX ADMIN — AZITHROMYCIN DIHYDRATE 500 MG: 250 TABLET, FILM COATED ORAL at 08:56

## 2020-12-25 RX ADMIN — ENOXAPARIN SODIUM 40 MG: 40 INJECTION SUBCUTANEOUS at 16:58

## 2020-12-25 RX ADMIN — ALPRAZOLAM 0.5 MG: 0.5 TABLET ORAL at 08:56

## 2020-12-25 RX ADMIN — ALBUTEROL SULFATE 2 PUFF: 108 AEROSOL, METERED RESPIRATORY (INHALATION) at 19:05

## 2020-12-25 RX ADMIN — Medication 10 MG: at 21:58

## 2020-12-25 RX ADMIN — DEXAMETHASONE 6 MG: 6 TABLET ORAL at 05:34

## 2020-12-25 RX ADMIN — DEXAMETHASONE 6 MG: 6 TABLET ORAL at 21:58

## 2020-12-25 RX ADMIN — GUAIFENESIN 1200 MG: 600 TABLET, EXTENDED RELEASE ORAL at 21:58

## 2020-12-25 RX ADMIN — ALPRAZOLAM 0.5 MG: 0.5 TABLET ORAL at 21:59

## 2020-12-25 RX ADMIN — Medication 10 ML: at 21:59

## 2020-12-25 RX ADMIN — Medication 10 ML: at 08:56

## 2020-12-25 RX ADMIN — PANTOPRAZOLE SODIUM 40 MG: 40 TABLET, DELAYED RELEASE ORAL at 08:56

## 2020-12-25 RX ADMIN — Medication 5000 UNITS: at 08:56

## 2020-12-26 LAB
ALBUMIN SERPL-MCNC: 3.2 G/DL (ref 3.5–5.2)
ALBUMIN/GLOB SERPL: 1.2 G/DL
ALP SERPL-CCNC: 98 U/L (ref 39–117)
ALT SERPL W P-5'-P-CCNC: 93 U/L (ref 1–33)
ANION GAP SERPL CALCULATED.3IONS-SCNC: 9 MMOL/L (ref 5–15)
AST SERPL-CCNC: 39 U/L (ref 1–32)
BASOPHILS # BLD AUTO: 0 10*3/MM3 (ref 0–0.2)
BASOPHILS NFR BLD AUTO: 0.1 % (ref 0–1.5)
BILIRUB CONJ SERPL-MCNC: <0.2 MG/DL (ref 0–0.3)
BILIRUB SERPL-MCNC: 0.2 MG/DL (ref 0–1.2)
BUN SERPL-MCNC: 19 MG/DL (ref 8–23)
BUN/CREAT SERPL: 29.7 (ref 7–25)
CALCIUM SPEC-SCNC: 8.6 MG/DL (ref 8.6–10.5)
CHLORIDE SERPL-SCNC: 102 MMOL/L (ref 98–107)
CO2 SERPL-SCNC: 27 MMOL/L (ref 22–29)
CREAT SERPL-MCNC: 0.64 MG/DL (ref 0.57–1)
CRP SERPL-MCNC: 4.3 MG/DL (ref 0–0.5)
D DIMER PPP FEU-MCNC: 0.53 MG/L (FEU) (ref 0–0.59)
DEPRECATED RDW RBC AUTO: 44.2 FL (ref 37–54)
EOSINOPHIL # BLD AUTO: 0 10*3/MM3 (ref 0–0.4)
EOSINOPHIL NFR BLD AUTO: 0 % (ref 0.3–6.2)
ERYTHROCYTE [DISTWIDTH] IN BLOOD BY AUTOMATED COUNT: 14.4 % (ref 12.3–15.4)
FERRITIN SERPL-MCNC: 351.5 NG/ML (ref 13–150)
GFR SERPL CREATININE-BSD FRML MDRD: 93 ML/MIN/1.73
GLOBULIN UR ELPH-MCNC: 2.7 GM/DL
GLUCOSE BLDC GLUCOMTR-MCNC: 139 MG/DL (ref 70–105)
GLUCOSE BLDC GLUCOMTR-MCNC: 190 MG/DL (ref 70–105)
GLUCOSE BLDC GLUCOMTR-MCNC: 218 MG/DL (ref 70–105)
GLUCOSE BLDC GLUCOMTR-MCNC: 230 MG/DL (ref 70–105)
GLUCOSE SERPL-MCNC: 174 MG/DL (ref 65–99)
HCT VFR BLD AUTO: 36.7 % (ref 34–46.6)
HGB BLD-MCNC: 12.1 G/DL (ref 12–15.9)
LYMPHOCYTES # BLD AUTO: 0.6 10*3/MM3 (ref 0.7–3.1)
LYMPHOCYTES NFR BLD AUTO: 4.9 % (ref 19.6–45.3)
MCH RBC QN AUTO: 28.8 PG (ref 26.6–33)
MCHC RBC AUTO-ENTMCNC: 33 G/DL (ref 31.5–35.7)
MCV RBC AUTO: 87.3 FL (ref 79–97)
MONOCYTES # BLD AUTO: 0.5 10*3/MM3 (ref 0.1–0.9)
MONOCYTES NFR BLD AUTO: 3.9 % (ref 5–12)
NEUTROPHILS NFR BLD AUTO: 11.9 10*3/MM3 (ref 1.7–7)
NEUTROPHILS NFR BLD AUTO: 91.1 % (ref 42.7–76)
NRBC BLD AUTO-RTO: 0.1 /100 WBC (ref 0–0.2)
PLATELET # BLD AUTO: 237 10*3/MM3 (ref 140–450)
PMV BLD AUTO: 8.6 FL (ref 6–12)
POTASSIUM SERPL-SCNC: 4.7 MMOL/L (ref 3.5–5.2)
PROCALCITONIN SERPL-MCNC: 0.08 NG/ML (ref 0–0.25)
PROT SERPL-MCNC: 5.9 G/DL (ref 6–8.5)
RBC # BLD AUTO: 4.2 10*6/MM3 (ref 3.77–5.28)
SODIUM SERPL-SCNC: 138 MMOL/L (ref 136–145)
WBC # BLD AUTO: 13 10*3/MM3 (ref 3.4–10.8)

## 2020-12-26 PROCEDURE — 63710000001 INSULIN LISPRO (HUMAN) PER 5 UNITS: Performed by: INTERNAL MEDICINE

## 2020-12-26 PROCEDURE — 99232 SBSQ HOSP IP/OBS MODERATE 35: CPT | Performed by: INTERNAL MEDICINE

## 2020-12-26 PROCEDURE — 82728 ASSAY OF FERRITIN: CPT | Performed by: PHYSICIAN ASSISTANT

## 2020-12-26 PROCEDURE — 80053 COMPREHEN METABOLIC PANEL: CPT | Performed by: PHYSICIAN ASSISTANT

## 2020-12-26 PROCEDURE — 94799 UNLISTED PULMONARY SVC/PX: CPT

## 2020-12-26 PROCEDURE — 82248 BILIRUBIN DIRECT: CPT | Performed by: INTERNAL MEDICINE

## 2020-12-26 PROCEDURE — 86140 C-REACTIVE PROTEIN: CPT | Performed by: PHYSICIAN ASSISTANT

## 2020-12-26 PROCEDURE — 85025 COMPLETE CBC W/AUTO DIFF WBC: CPT | Performed by: PHYSICIAN ASSISTANT

## 2020-12-26 PROCEDURE — 85379 FIBRIN DEGRADATION QUANT: CPT | Performed by: PHYSICIAN ASSISTANT

## 2020-12-26 PROCEDURE — 25010000002 ENOXAPARIN PER 10 MG: Performed by: PHYSICIAN ASSISTANT

## 2020-12-26 PROCEDURE — 84145 PROCALCITONIN (PCT): CPT | Performed by: PHYSICIAN ASSISTANT

## 2020-12-26 PROCEDURE — 82962 GLUCOSE BLOOD TEST: CPT

## 2020-12-26 RX ORDER — DEXTROSE MONOHYDRATE 25 G/50ML
25 INJECTION, SOLUTION INTRAVENOUS
Status: DISCONTINUED | OUTPATIENT
Start: 2020-12-26 | End: 2020-12-29 | Stop reason: HOSPADM

## 2020-12-26 RX ORDER — INSULIN LISPRO 100 [IU]/ML
0-9 INJECTION, SOLUTION INTRAVENOUS; SUBCUTANEOUS AS NEEDED
Status: DISCONTINUED | OUTPATIENT
Start: 2020-12-26 | End: 2020-12-27

## 2020-12-26 RX ORDER — NICOTINE POLACRILEX 4 MG
15 LOZENGE BUCCAL
Status: DISCONTINUED | OUTPATIENT
Start: 2020-12-26 | End: 2020-12-29 | Stop reason: HOSPADM

## 2020-12-26 RX ORDER — INSULIN LISPRO 100 [IU]/ML
0-9 INJECTION, SOLUTION INTRAVENOUS; SUBCUTANEOUS
Status: DISCONTINUED | OUTPATIENT
Start: 2020-12-26 | End: 2020-12-27

## 2020-12-26 RX ADMIN — ALPRAZOLAM 0.5 MG: 0.5 TABLET ORAL at 09:47

## 2020-12-26 RX ADMIN — OXYCODONE HYDROCHLORIDE AND ACETAMINOPHEN 500 MG: 500 TABLET ORAL at 21:26

## 2020-12-26 RX ADMIN — Medication 5000 UNITS: at 09:47

## 2020-12-26 RX ADMIN — PANTOPRAZOLE SODIUM 40 MG: 40 TABLET, DELAYED RELEASE ORAL at 09:47

## 2020-12-26 RX ADMIN — ALBUTEROL SULFATE 2 PUFF: 108 AEROSOL, METERED RESPIRATORY (INHALATION) at 10:40

## 2020-12-26 RX ADMIN — SODIUM CHLORIDE 100 MG: 9 INJECTION, SOLUTION INTRAVENOUS at 11:56

## 2020-12-26 RX ADMIN — GUAIFENESIN 1200 MG: 600 TABLET, EXTENDED RELEASE ORAL at 09:47

## 2020-12-26 RX ADMIN — INSULIN LISPRO 2 UNITS: 100 INJECTION, SOLUTION INTRAVENOUS; SUBCUTANEOUS at 12:05

## 2020-12-26 RX ADMIN — Medication 10 ML: at 21:28

## 2020-12-26 RX ADMIN — GUAIFENESIN 1200 MG: 600 TABLET, EXTENDED RELEASE ORAL at 21:26

## 2020-12-26 RX ADMIN — AZITHROMYCIN DIHYDRATE 500 MG: 250 TABLET, FILM COATED ORAL at 09:47

## 2020-12-26 RX ADMIN — ALBUTEROL SULFATE 2 PUFF: 108 AEROSOL, METERED RESPIRATORY (INHALATION) at 06:45

## 2020-12-26 RX ADMIN — BENZONATATE 100 MG: 100 CAPSULE ORAL at 21:26

## 2020-12-26 RX ADMIN — ENOXAPARIN SODIUM 40 MG: 40 INJECTION SUBCUTANEOUS at 17:00

## 2020-12-26 RX ADMIN — LEVOTHYROXINE SODIUM 50 MCG: 0.05 TABLET ORAL at 09:48

## 2020-12-26 RX ADMIN — ALPRAZOLAM 0.5 MG: 0.5 TABLET ORAL at 21:28

## 2020-12-26 RX ADMIN — DEXAMETHASONE 6 MG: 6 TABLET ORAL at 13:20

## 2020-12-26 RX ADMIN — ALBUTEROL SULFATE 2 PUFF: 108 AEROSOL, METERED RESPIRATORY (INHALATION) at 21:08

## 2020-12-26 RX ADMIN — OXYCODONE HYDROCHLORIDE AND ACETAMINOPHEN 500 MG: 500 TABLET ORAL at 09:47

## 2020-12-26 RX ADMIN — INSULIN LISPRO 4 UNITS: 100 INJECTION, SOLUTION INTRAVENOUS; SUBCUTANEOUS at 17:16

## 2020-12-26 RX ADMIN — ZINC SULFATE 220 MG (50 MG) CAPSULE 220 MG: CAPSULE at 09:47

## 2020-12-26 RX ADMIN — ACETAMINOPHEN 650 MG: 325 TABLET, FILM COATED ORAL at 21:26

## 2020-12-26 RX ADMIN — Medication 10 MG: at 21:26

## 2020-12-26 RX ADMIN — ALBUTEROL SULFATE 2 PUFF: 108 AEROSOL, METERED RESPIRATORY (INHALATION) at 14:38

## 2020-12-26 RX ADMIN — DEXAMETHASONE 6 MG: 6 TABLET ORAL at 05:26

## 2020-12-26 RX ADMIN — DEXAMETHASONE 6 MG: 6 TABLET ORAL at 21:26

## 2020-12-27 LAB
ALBUMIN SERPL-MCNC: 3.3 G/DL (ref 3.5–5.2)
ALBUMIN/GLOB SERPL: 1.2 G/DL
ALP SERPL-CCNC: 96 U/L (ref 39–117)
ALT SERPL W P-5'-P-CCNC: 98 U/L (ref 1–33)
ANION GAP SERPL CALCULATED.3IONS-SCNC: 7 MMOL/L (ref 5–15)
AST SERPL-CCNC: 38 U/L (ref 1–32)
BASOPHILS # BLD AUTO: 0 10*3/MM3 (ref 0–0.2)
BASOPHILS NFR BLD AUTO: 0.1 % (ref 0–1.5)
BILIRUB CONJ SERPL-MCNC: <0.2 MG/DL (ref 0–0.3)
BILIRUB SERPL-MCNC: 0.2 MG/DL (ref 0–1.2)
BUN SERPL-MCNC: 21 MG/DL (ref 8–23)
BUN/CREAT SERPL: 29.6 (ref 7–25)
CALCIUM SPEC-SCNC: 8.8 MG/DL (ref 8.6–10.5)
CHLORIDE SERPL-SCNC: 103 MMOL/L (ref 98–107)
CO2 SERPL-SCNC: 28 MMOL/L (ref 22–29)
CREAT SERPL-MCNC: 0.71 MG/DL (ref 0.57–1)
CRP SERPL-MCNC: 2.27 MG/DL (ref 0–0.5)
D DIMER PPP FEU-MCNC: 0.62 MG/L (FEU) (ref 0–0.59)
DEPRECATED RDW RBC AUTO: 43.3 FL (ref 37–54)
EOSINOPHIL # BLD AUTO: 0 10*3/MM3 (ref 0–0.4)
EOSINOPHIL NFR BLD AUTO: 0 % (ref 0.3–6.2)
ERYTHROCYTE [DISTWIDTH] IN BLOOD BY AUTOMATED COUNT: 14.2 % (ref 12.3–15.4)
FERRITIN SERPL-MCNC: 322.9 NG/ML (ref 13–150)
GFR SERPL CREATININE-BSD FRML MDRD: 82 ML/MIN/1.73
GLOBULIN UR ELPH-MCNC: 2.7 GM/DL
GLUCOSE BLDC GLUCOMTR-MCNC: 149 MG/DL (ref 70–105)
GLUCOSE BLDC GLUCOMTR-MCNC: 208 MG/DL (ref 70–105)
GLUCOSE BLDC GLUCOMTR-MCNC: 209 MG/DL (ref 70–105)
GLUCOSE BLDC GLUCOMTR-MCNC: 245 MG/DL (ref 70–105)
GLUCOSE SERPL-MCNC: 168 MG/DL (ref 65–99)
HCT VFR BLD AUTO: 37.7 % (ref 34–46.6)
HGB BLD-MCNC: 12.6 G/DL (ref 12–15.9)
IL6 SERPL-MCNC: 18.2 PG/ML (ref 0–13)
LYMPHOCYTES # BLD AUTO: 0.6 10*3/MM3 (ref 0.7–3.1)
LYMPHOCYTES NFR BLD AUTO: 4.5 % (ref 19.6–45.3)
MCH RBC QN AUTO: 29.1 PG (ref 26.6–33)
MCHC RBC AUTO-ENTMCNC: 33.3 G/DL (ref 31.5–35.7)
MCV RBC AUTO: 87.5 FL (ref 79–97)
MONOCYTES # BLD AUTO: 0.4 10*3/MM3 (ref 0.1–0.9)
MONOCYTES NFR BLD AUTO: 3 % (ref 5–12)
NEUTROPHILS NFR BLD AUTO: 11.7 10*3/MM3 (ref 1.7–7)
NEUTROPHILS NFR BLD AUTO: 92.4 % (ref 42.7–76)
NRBC BLD AUTO-RTO: 0 /100 WBC (ref 0–0.2)
PLATELET # BLD AUTO: 237 10*3/MM3 (ref 140–450)
PMV BLD AUTO: 8.6 FL (ref 6–12)
POTASSIUM SERPL-SCNC: 4.8 MMOL/L (ref 3.5–5.2)
PROCALCITONIN SERPL-MCNC: 0.08 NG/ML (ref 0–0.25)
PROT SERPL-MCNC: 6 G/DL (ref 6–8.5)
RBC # BLD AUTO: 4.31 10*6/MM3 (ref 3.77–5.28)
SODIUM SERPL-SCNC: 138 MMOL/L (ref 136–145)
WBC # BLD AUTO: 12.7 10*3/MM3 (ref 3.4–10.8)

## 2020-12-27 PROCEDURE — 80053 COMPREHEN METABOLIC PANEL: CPT | Performed by: PHYSICIAN ASSISTANT

## 2020-12-27 PROCEDURE — 86140 C-REACTIVE PROTEIN: CPT | Performed by: PHYSICIAN ASSISTANT

## 2020-12-27 PROCEDURE — 84145 PROCALCITONIN (PCT): CPT | Performed by: PHYSICIAN ASSISTANT

## 2020-12-27 PROCEDURE — 63710000001 INSULIN LISPRO (HUMAN) PER 5 UNITS: Performed by: INTERNAL MEDICINE

## 2020-12-27 PROCEDURE — 85025 COMPLETE CBC W/AUTO DIFF WBC: CPT | Performed by: PHYSICIAN ASSISTANT

## 2020-12-27 PROCEDURE — 99232 SBSQ HOSP IP/OBS MODERATE 35: CPT | Performed by: INTERNAL MEDICINE

## 2020-12-27 PROCEDURE — 85379 FIBRIN DEGRADATION QUANT: CPT | Performed by: PHYSICIAN ASSISTANT

## 2020-12-27 PROCEDURE — 25010000002 ENOXAPARIN PER 10 MG: Performed by: PHYSICIAN ASSISTANT

## 2020-12-27 PROCEDURE — 94618 PULMONARY STRESS TESTING: CPT

## 2020-12-27 PROCEDURE — 94799 UNLISTED PULMONARY SVC/PX: CPT

## 2020-12-27 PROCEDURE — 82728 ASSAY OF FERRITIN: CPT | Performed by: PHYSICIAN ASSISTANT

## 2020-12-27 PROCEDURE — 82248 BILIRUBIN DIRECT: CPT | Performed by: INTERNAL MEDICINE

## 2020-12-27 PROCEDURE — 82962 GLUCOSE BLOOD TEST: CPT

## 2020-12-27 RX ORDER — INSULIN LISPRO 100 [IU]/ML
0-14 INJECTION, SOLUTION INTRAVENOUS; SUBCUTANEOUS
Status: DISCONTINUED | OUTPATIENT
Start: 2020-12-27 | End: 2020-12-29 | Stop reason: HOSPADM

## 2020-12-27 RX ORDER — INSULIN LISPRO 100 [IU]/ML
0-14 INJECTION, SOLUTION INTRAVENOUS; SUBCUTANEOUS AS NEEDED
Status: DISCONTINUED | OUTPATIENT
Start: 2020-12-27 | End: 2020-12-29 | Stop reason: HOSPADM

## 2020-12-27 RX ORDER — DEXAMETHASONE 6 MG/1
6 TABLET ORAL
Status: DISCONTINUED | OUTPATIENT
Start: 2020-12-28 | End: 2020-12-29 | Stop reason: HOSPADM

## 2020-12-27 RX ADMIN — INSULIN LISPRO 5 UNITS: 100 INJECTION, SOLUTION INTRAVENOUS; SUBCUTANEOUS at 13:12

## 2020-12-27 RX ADMIN — ALPRAZOLAM 0.5 MG: 0.5 TABLET ORAL at 08:11

## 2020-12-27 RX ADMIN — PANTOPRAZOLE SODIUM 40 MG: 40 TABLET, DELAYED RELEASE ORAL at 08:11

## 2020-12-27 RX ADMIN — Medication 10 ML: at 08:10

## 2020-12-27 RX ADMIN — Medication 10 MG: at 20:43

## 2020-12-27 RX ADMIN — ALBUTEROL SULFATE 2 PUFF: 108 AEROSOL, METERED RESPIRATORY (INHALATION) at 10:26

## 2020-12-27 RX ADMIN — GUAIFENESIN 1200 MG: 600 TABLET, EXTENDED RELEASE ORAL at 20:43

## 2020-12-27 RX ADMIN — ZINC SULFATE 220 MG (50 MG) CAPSULE 220 MG: CAPSULE at 08:10

## 2020-12-27 RX ADMIN — ALBUTEROL SULFATE 2 PUFF: 108 AEROSOL, METERED RESPIRATORY (INHALATION) at 18:33

## 2020-12-27 RX ADMIN — ENOXAPARIN SODIUM 40 MG: 40 INJECTION SUBCUTANEOUS at 13:12

## 2020-12-27 RX ADMIN — INSULIN LISPRO 5 UNITS: 100 INJECTION, SOLUTION INTRAVENOUS; SUBCUTANEOUS at 16:54

## 2020-12-27 RX ADMIN — ACETAMINOPHEN 650 MG: 325 TABLET, FILM COATED ORAL at 20:43

## 2020-12-27 RX ADMIN — SODIUM CHLORIDE 100 MG: 9 INJECTION, SOLUTION INTRAVENOUS at 13:11

## 2020-12-27 RX ADMIN — BENZONATATE 100 MG: 100 CAPSULE ORAL at 20:43

## 2020-12-27 RX ADMIN — OXYCODONE HYDROCHLORIDE AND ACETAMINOPHEN 500 MG: 500 TABLET ORAL at 20:43

## 2020-12-27 RX ADMIN — AZITHROMYCIN DIHYDRATE 500 MG: 250 TABLET, FILM COATED ORAL at 08:11

## 2020-12-27 RX ADMIN — DEXAMETHASONE 6 MG: 6 TABLET ORAL at 05:32

## 2020-12-27 RX ADMIN — GUAIFENESIN 1200 MG: 600 TABLET, EXTENDED RELEASE ORAL at 08:10

## 2020-12-27 RX ADMIN — INSULIN LISPRO 5 UNITS: 100 INJECTION, SOLUTION INTRAVENOUS; SUBCUTANEOUS at 20:43

## 2020-12-27 RX ADMIN — ALBUTEROL SULFATE 2 PUFF: 108 AEROSOL, METERED RESPIRATORY (INHALATION) at 06:19

## 2020-12-27 RX ADMIN — ALPRAZOLAM 0.5 MG: 0.5 TABLET ORAL at 20:43

## 2020-12-27 RX ADMIN — Medication 10 ML: at 20:43

## 2020-12-27 RX ADMIN — OXYCODONE HYDROCHLORIDE AND ACETAMINOPHEN 500 MG: 500 TABLET ORAL at 08:10

## 2020-12-27 RX ADMIN — AMLODIPINE BESYLATE 5 MG: 5 TABLET ORAL at 08:10

## 2020-12-27 RX ADMIN — LEVOTHYROXINE SODIUM 50 MCG: 0.05 TABLET ORAL at 08:10

## 2020-12-27 RX ADMIN — ALBUTEROL SULFATE 2 PUFF: 108 AEROSOL, METERED RESPIRATORY (INHALATION) at 14:36

## 2020-12-28 LAB
ALBUMIN SERPL-MCNC: 3 G/DL (ref 3.5–5.2)
ALBUMIN/GLOB SERPL: 1.3 G/DL
ALP SERPL-CCNC: 100 U/L (ref 39–117)
ALT SERPL W P-5'-P-CCNC: 79 U/L (ref 1–33)
ANION GAP SERPL CALCULATED.3IONS-SCNC: 5 MMOL/L (ref 5–15)
AST SERPL-CCNC: 23 U/L (ref 1–32)
BILIRUB CONJ SERPL-MCNC: <0.2 MG/DL (ref 0–0.3)
BILIRUB SERPL-MCNC: 0.2 MG/DL (ref 0–1.2)
BUN SERPL-MCNC: 22 MG/DL (ref 8–23)
BUN/CREAT SERPL: 26.8 (ref 7–25)
CALCIUM SPEC-SCNC: 8.5 MG/DL (ref 8.6–10.5)
CHLORIDE SERPL-SCNC: 104 MMOL/L (ref 98–107)
CO2 SERPL-SCNC: 29 MMOL/L (ref 22–29)
CREAT SERPL-MCNC: 0.82 MG/DL (ref 0.57–1)
CRP SERPL-MCNC: 1.22 MG/DL (ref 0–0.5)
D DIMER PPP FEU-MCNC: 0.93 MG/L (FEU) (ref 0–0.59)
DEPRECATED RDW RBC AUTO: 43.8 FL (ref 37–54)
ERYTHROCYTE [DISTWIDTH] IN BLOOD BY AUTOMATED COUNT: 14.5 % (ref 12.3–15.4)
FERRITIN SERPL-MCNC: 240.9 NG/ML (ref 13–150)
GFR SERPL CREATININE-BSD FRML MDRD: 70 ML/MIN/1.73
GLOBULIN UR ELPH-MCNC: 2.4 GM/DL
GLUCOSE BLDC GLUCOMTR-MCNC: 122 MG/DL (ref 70–105)
GLUCOSE BLDC GLUCOMTR-MCNC: 171 MG/DL (ref 70–105)
GLUCOSE BLDC GLUCOMTR-MCNC: 203 MG/DL (ref 70–105)
GLUCOSE BLDC GLUCOMTR-MCNC: 78 MG/DL (ref 70–105)
GLUCOSE SERPL-MCNC: 89 MG/DL (ref 65–99)
HCT VFR BLD AUTO: 37.9 % (ref 34–46.6)
HGB BLD-MCNC: 12.3 G/DL (ref 12–15.9)
LYMPHOCYTES # BLD MANUAL: 1.12 10*3/MM3 (ref 0.7–3.1)
LYMPHOCYTES NFR BLD MANUAL: 10 % (ref 19.6–45.3)
MCH RBC QN AUTO: 28.4 PG (ref 26.6–33)
MCHC RBC AUTO-ENTMCNC: 32.4 G/DL (ref 31.5–35.7)
MCV RBC AUTO: 87.5 FL (ref 79–97)
NEUTROPHILS # BLD AUTO: 10.08 10*3/MM3 (ref 1.7–7)
NEUTROPHILS NFR BLD MANUAL: 90 % (ref 42.7–76)
PLAT MORPH BLD: NORMAL
PLATELET # BLD AUTO: 243 10*3/MM3 (ref 140–450)
PMV BLD AUTO: 8.3 FL (ref 6–12)
POTASSIUM SERPL-SCNC: 4.4 MMOL/L (ref 3.5–5.2)
PROCALCITONIN SERPL-MCNC: 0.09 NG/ML (ref 0–0.25)
PROT SERPL-MCNC: 5.4 G/DL (ref 6–8.5)
RBC # BLD AUTO: 4.33 10*6/MM3 (ref 3.77–5.28)
RBC MORPH BLD: NORMAL
SCAN SLIDE: NORMAL
SODIUM SERPL-SCNC: 138 MMOL/L (ref 136–145)
WBC # BLD AUTO: 11.2 10*3/MM3 (ref 3.4–10.8)
WBC MORPH BLD: NORMAL

## 2020-12-28 PROCEDURE — 85379 FIBRIN DEGRADATION QUANT: CPT | Performed by: PHYSICIAN ASSISTANT

## 2020-12-28 PROCEDURE — 85025 COMPLETE CBC W/AUTO DIFF WBC: CPT | Performed by: PHYSICIAN ASSISTANT

## 2020-12-28 PROCEDURE — 94799 UNLISTED PULMONARY SVC/PX: CPT

## 2020-12-28 PROCEDURE — 63710000001 INSULIN LISPRO (HUMAN) PER 5 UNITS: Performed by: INTERNAL MEDICINE

## 2020-12-28 PROCEDURE — 99232 SBSQ HOSP IP/OBS MODERATE 35: CPT | Performed by: INTERNAL MEDICINE

## 2020-12-28 PROCEDURE — 84145 PROCALCITONIN (PCT): CPT | Performed by: PHYSICIAN ASSISTANT

## 2020-12-28 PROCEDURE — 82728 ASSAY OF FERRITIN: CPT | Performed by: PHYSICIAN ASSISTANT

## 2020-12-28 PROCEDURE — 25010000002 ENOXAPARIN PER 10 MG: Performed by: PHYSICIAN ASSISTANT

## 2020-12-28 PROCEDURE — 85007 BL SMEAR W/DIFF WBC COUNT: CPT | Performed by: PHYSICIAN ASSISTANT

## 2020-12-28 PROCEDURE — 82962 GLUCOSE BLOOD TEST: CPT

## 2020-12-28 PROCEDURE — 80053 COMPREHEN METABOLIC PANEL: CPT | Performed by: PHYSICIAN ASSISTANT

## 2020-12-28 PROCEDURE — 82248 BILIRUBIN DIRECT: CPT | Performed by: INTERNAL MEDICINE

## 2020-12-28 PROCEDURE — 86140 C-REACTIVE PROTEIN: CPT | Performed by: PHYSICIAN ASSISTANT

## 2020-12-28 RX ADMIN — PANTOPRAZOLE SODIUM 40 MG: 40 TABLET, DELAYED RELEASE ORAL at 08:00

## 2020-12-28 RX ADMIN — GUAIFENESIN 1200 MG: 600 TABLET, EXTENDED RELEASE ORAL at 07:59

## 2020-12-28 RX ADMIN — ZINC SULFATE 220 MG (50 MG) CAPSULE 220 MG: CAPSULE at 07:59

## 2020-12-28 RX ADMIN — Medication 10 ML: at 07:58

## 2020-12-28 RX ADMIN — INSULIN LISPRO 3 UNITS: 100 INJECTION, SOLUTION INTRAVENOUS; SUBCUTANEOUS at 21:57

## 2020-12-28 RX ADMIN — BENZONATATE 100 MG: 100 CAPSULE ORAL at 19:31

## 2020-12-28 RX ADMIN — SODIUM CHLORIDE 100 MG: 9 INJECTION, SOLUTION INTRAVENOUS at 14:30

## 2020-12-28 RX ADMIN — DEXAMETHASONE 6 MG: 6 TABLET ORAL at 07:59

## 2020-12-28 RX ADMIN — INSULIN LISPRO 5 UNITS: 100 INJECTION, SOLUTION INTRAVENOUS; SUBCUTANEOUS at 17:25

## 2020-12-28 RX ADMIN — ALPRAZOLAM 0.5 MG: 0.5 TABLET ORAL at 19:30

## 2020-12-28 RX ADMIN — ALBUTEROL SULFATE 2 PUFF: 108 AEROSOL, METERED RESPIRATORY (INHALATION) at 15:03

## 2020-12-28 RX ADMIN — ACETAMINOPHEN 650 MG: 325 TABLET, FILM COATED ORAL at 19:31

## 2020-12-28 RX ADMIN — GUAIFENESIN 1200 MG: 600 TABLET, EXTENDED RELEASE ORAL at 19:30

## 2020-12-28 RX ADMIN — ALBUTEROL SULFATE 2 PUFF: 108 AEROSOL, METERED RESPIRATORY (INHALATION) at 07:06

## 2020-12-28 RX ADMIN — ENOXAPARIN SODIUM 40 MG: 40 INJECTION SUBCUTANEOUS at 14:31

## 2020-12-28 RX ADMIN — OXYCODONE HYDROCHLORIDE AND ACETAMINOPHEN 500 MG: 500 TABLET ORAL at 19:31

## 2020-12-28 RX ADMIN — LEVOTHYROXINE SODIUM 50 MCG: 0.05 TABLET ORAL at 08:00

## 2020-12-28 RX ADMIN — ALPRAZOLAM 0.5 MG: 0.5 TABLET ORAL at 07:59

## 2020-12-28 RX ADMIN — ALBUTEROL SULFATE 2 PUFF: 108 AEROSOL, METERED RESPIRATORY (INHALATION) at 10:53

## 2020-12-28 RX ADMIN — Medication 10 ML: at 19:31

## 2020-12-28 RX ADMIN — Medication 10 MG: at 19:30

## 2020-12-28 RX ADMIN — ALBUTEROL SULFATE 2 PUFF: 108 AEROSOL, METERED RESPIRATORY (INHALATION) at 20:16

## 2020-12-28 RX ADMIN — AZITHROMYCIN DIHYDRATE 500 MG: 250 TABLET, FILM COATED ORAL at 07:59

## 2020-12-28 RX ADMIN — OXYCODONE HYDROCHLORIDE AND ACETAMINOPHEN 500 MG: 500 TABLET ORAL at 08:00

## 2020-12-29 ENCOUNTER — READMISSION MANAGEMENT (OUTPATIENT)
Dept: CALL CENTER | Facility: HOSPITAL | Age: 65
End: 2020-12-29

## 2020-12-29 VITALS
HEART RATE: 72 BPM | DIASTOLIC BLOOD PRESSURE: 72 MMHG | OXYGEN SATURATION: 94 % | TEMPERATURE: 95.8 F | SYSTOLIC BLOOD PRESSURE: 110 MMHG | HEIGHT: 62 IN | BODY MASS INDEX: 37.16 KG/M2 | WEIGHT: 201.94 LBS | RESPIRATION RATE: 20 BRPM

## 2020-12-29 LAB
ALBUMIN SERPL-MCNC: 3.1 G/DL (ref 3.5–5.2)
ALBUMIN/GLOB SERPL: 1.3 G/DL
ALP SERPL-CCNC: 97 U/L (ref 39–117)
ALT SERPL W P-5'-P-CCNC: 60 U/L (ref 1–33)
ANION GAP SERPL CALCULATED.3IONS-SCNC: 6 MMOL/L (ref 5–15)
AST SERPL-CCNC: 17 U/L (ref 1–32)
BILIRUB CONJ SERPL-MCNC: <0.2 MG/DL (ref 0–0.3)
BILIRUB SERPL-MCNC: 0.2 MG/DL (ref 0–1.2)
BUN SERPL-MCNC: 17 MG/DL (ref 8–23)
BUN/CREAT SERPL: 22.4 (ref 7–25)
CALCIUM SPEC-SCNC: 8.6 MG/DL (ref 8.6–10.5)
CHLORIDE SERPL-SCNC: 105 MMOL/L (ref 98–107)
CO2 SERPL-SCNC: 29 MMOL/L (ref 22–29)
CREAT SERPL-MCNC: 0.76 MG/DL (ref 0.57–1)
CRP SERPL-MCNC: 1.67 MG/DL (ref 0–0.5)
D DIMER PPP FEU-MCNC: 0.87 MG/L (FEU) (ref 0–0.59)
DEPRECATED RDW RBC AUTO: 43.3 FL (ref 37–54)
ERYTHROCYTE [DISTWIDTH] IN BLOOD BY AUTOMATED COUNT: 14.4 % (ref 12.3–15.4)
FERRITIN SERPL-MCNC: 218.9 NG/ML (ref 13–150)
GFR SERPL CREATININE-BSD FRML MDRD: 76 ML/MIN/1.73
GLOBULIN UR ELPH-MCNC: 2.4 GM/DL
GLUCOSE BLDC GLUCOMTR-MCNC: 135 MG/DL (ref 70–105)
GLUCOSE BLDC GLUCOMTR-MCNC: 84 MG/DL (ref 70–105)
GLUCOSE SERPL-MCNC: 122 MG/DL (ref 65–99)
HCT VFR BLD AUTO: 36.7 % (ref 34–46.6)
HGB BLD-MCNC: 12.2 G/DL (ref 12–15.9)
LYMPHOCYTES # BLD MANUAL: 1.52 10*3/MM3 (ref 0.7–3.1)
LYMPHOCYTES NFR BLD MANUAL: 16 % (ref 19.6–45.3)
LYMPHOCYTES NFR BLD MANUAL: 3 % (ref 5–12)
MCH RBC QN AUTO: 29 PG (ref 26.6–33)
MCHC RBC AUTO-ENTMCNC: 33.2 G/DL (ref 31.5–35.7)
MCV RBC AUTO: 87.4 FL (ref 79–97)
METAMYELOCYTES NFR BLD MANUAL: 2 % (ref 0–0)
MONOCYTES # BLD AUTO: 0.29 10*3/MM3 (ref 0.1–0.9)
MYELOCYTES NFR BLD MANUAL: 1 % (ref 0–0)
NEUTROPHILS # BLD AUTO: 7.41 10*3/MM3 (ref 1.7–7)
NEUTROPHILS NFR BLD MANUAL: 72 % (ref 42.7–76)
NEUTS BAND NFR BLD MANUAL: 6 % (ref 0–5)
PLAT MORPH BLD: NORMAL
PLATELET # BLD AUTO: 247 10*3/MM3 (ref 140–450)
PMV BLD AUTO: 7.9 FL (ref 6–12)
POTASSIUM SERPL-SCNC: 4.7 MMOL/L (ref 3.5–5.2)
PROCALCITONIN SERPL-MCNC: 0.08 NG/ML (ref 0–0.25)
PROT SERPL-MCNC: 5.5 G/DL (ref 6–8.5)
RBC # BLD AUTO: 4.19 10*6/MM3 (ref 3.77–5.28)
RBC MORPH BLD: NORMAL
SCAN SLIDE: NORMAL
SODIUM SERPL-SCNC: 140 MMOL/L (ref 136–145)
WBC # BLD AUTO: 9.5 10*3/MM3 (ref 3.4–10.8)
WBC MORPH BLD: NORMAL

## 2020-12-29 PROCEDURE — 99238 HOSP IP/OBS DSCHRG MGMT 30/<: CPT | Performed by: INTERNAL MEDICINE

## 2020-12-29 PROCEDURE — 82962 GLUCOSE BLOOD TEST: CPT

## 2020-12-29 PROCEDURE — 85007 BL SMEAR W/DIFF WBC COUNT: CPT | Performed by: PHYSICIAN ASSISTANT

## 2020-12-29 PROCEDURE — 82728 ASSAY OF FERRITIN: CPT | Performed by: PHYSICIAN ASSISTANT

## 2020-12-29 PROCEDURE — 82248 BILIRUBIN DIRECT: CPT | Performed by: INTERNAL MEDICINE

## 2020-12-29 PROCEDURE — 80053 COMPREHEN METABOLIC PANEL: CPT | Performed by: PHYSICIAN ASSISTANT

## 2020-12-29 PROCEDURE — 85025 COMPLETE CBC W/AUTO DIFF WBC: CPT | Performed by: PHYSICIAN ASSISTANT

## 2020-12-29 PROCEDURE — 86140 C-REACTIVE PROTEIN: CPT | Performed by: PHYSICIAN ASSISTANT

## 2020-12-29 PROCEDURE — 85379 FIBRIN DEGRADATION QUANT: CPT | Performed by: PHYSICIAN ASSISTANT

## 2020-12-29 PROCEDURE — 94799 UNLISTED PULMONARY SVC/PX: CPT

## 2020-12-29 PROCEDURE — 84145 PROCALCITONIN (PCT): CPT | Performed by: PHYSICIAN ASSISTANT

## 2020-12-29 RX ORDER — ASCORBIC ACID 500 MG
500 TABLET ORAL 2 TIMES DAILY
Qty: 10 TABLET | Refills: 0 | Status: SHIPPED | OUTPATIENT
Start: 2020-12-29 | End: 2021-01-03

## 2020-12-29 RX ADMIN — Medication 10 ML: at 08:45

## 2020-12-29 RX ADMIN — ALBUTEROL SULFATE 2 PUFF: 108 AEROSOL, METERED RESPIRATORY (INHALATION) at 10:17

## 2020-12-29 RX ADMIN — PANTOPRAZOLE SODIUM 40 MG: 40 TABLET, DELAYED RELEASE ORAL at 08:45

## 2020-12-29 RX ADMIN — ALBUTEROL SULFATE 2 PUFF: 108 AEROSOL, METERED RESPIRATORY (INHALATION) at 08:03

## 2020-12-29 RX ADMIN — ZINC SULFATE 220 MG (50 MG) CAPSULE 220 MG: CAPSULE at 08:45

## 2020-12-29 RX ADMIN — GUAIFENESIN 1200 MG: 600 TABLET, EXTENDED RELEASE ORAL at 08:45

## 2020-12-29 RX ADMIN — ALPRAZOLAM 0.5 MG: 0.5 TABLET ORAL at 08:45

## 2020-12-29 RX ADMIN — AMLODIPINE BESYLATE 5 MG: 5 TABLET ORAL at 08:45

## 2020-12-29 RX ADMIN — OXYCODONE HYDROCHLORIDE AND ACETAMINOPHEN 500 MG: 500 TABLET ORAL at 08:45

## 2020-12-29 RX ADMIN — LEVOTHYROXINE SODIUM 50 MCG: 0.05 TABLET ORAL at 08:45

## 2020-12-29 RX ADMIN — DEXAMETHASONE 6 MG: 6 TABLET ORAL at 08:45

## 2020-12-29 NOTE — OUTREACH NOTE
Prep Survey      Responses   [facility]?  [facility]   Is LACE score < 7 ?  No   Emergency Room discharge w/ pulse ox?  No   Eligibility  TCM   Hospital  [facility]   Date of Admission  12/23/20   Date of Discharge  12/29/20   Discharge Disposition  Home or Self Care   Discharge diagnosis  Covid 19   Does the patient have one of the following disease processes/diagnoses(primary or secondary)?  COVID-19   Does the patient have Home health ordered?  No   Is there a DME ordered?  Yes   What DME was ordered?  Home O2 via [employer]   Prep survey completed?  Yes          Elizabeth Mercer RN

## 2020-12-30 ENCOUNTER — TRANSITIONAL CARE MANAGEMENT TELEPHONE ENCOUNTER (OUTPATIENT)
Dept: CALL CENTER | Facility: HOSPITAL | Age: 65
End: 2020-12-30

## 2020-12-30 ENCOUNTER — NURSE TRIAGE (OUTPATIENT)
Dept: CALL CENTER | Facility: HOSPITAL | Age: 65
End: 2020-12-30

## 2020-12-30 NOTE — TELEPHONE ENCOUNTER
"    Reason for Disposition  • Health Information question, no triage required and triager able to answer question    Additional Information  • Negative: [1] Caller is not with the adult (patient) AND [2] reporting urgent symptoms  • Negative: Lab result questions  • Negative: Medication questions  • Negative: Caller can't be reached by phone  • Negative: Caller has already spoken to PCP or another triager  • Negative: RN needs further essential information from caller in order to complete triage  • Negative: Requesting regular office appointment  • Negative: [1] Caller requesting NON-URGENT health information AND [2] PCP's office is the best resource    Answer Assessment - Initial Assessment Questions  1. REASON FOR CALL or QUESTION: \"What is your reason for calling today?\" or \"How can I best help you?\" or \"What question do you have that I can help answer?\"      She wants to make sure that she should wear her oxygen at all times as instructed. Explained that yes, she should and until she follows up with or talks to PCP.    Protocols used: INFORMATION ONLY CALL - NO TRIAGE-ADULT-      "

## 2020-12-30 NOTE — OUTREACH NOTE
Call Center TCM Note      Responses   [facility] patient discharged from?  [facility]   Does the patient have one of the following disease processes/diagnoses(primary or secondary)?  COVID-19   COVID-19 underlying condition?  None   TCM attempt successful?  Yes   Call start time  1447   Call end time  1458   Discharge diagnosis  Covid 19   Is patient permission given to speak with other caregiver?  No   Meds reviewed with patient/caregiver?  Yes   Is the patient having any side effects they believe may be caused by any medication additions or changes?  No   Does the patient have all medications ordered at discharge?  Yes   Is the patient taking all medications as directed (includes completed medication regime)?  Yes   Medication comments  Has home albuterol inhaler.    Does the patient have a primary care provider?   Yes   Comments regarding PCP  PCP Dr Nadia Mcclure. Hospital follow up scheduled for 1/6/21 1pm.    Does the patient have an appointment with their PCP or specialist within 7 days of discharge?  Yes   Has the patient kept scheduled appointments due by today?  N/A   What DME was ordered?  Home O2 via [employer]   Has all DME been delivered?  Yes   Psychosocial issues?  No   Did the patient receive a copy of their discharge instructions?  Yes   Did the patient receive a copy of COVID-19 specific instructions?  Yes   Nursing interventions  Reviewed instructions with patient   What is the patient's perception of their health status since discharge?  Improving   Does the patient have any of the following symptoms?  Cough, Shortness of breath [Shortness of breath with activity]   Nursing Interventions  Nurse provided patient education   Pulse Ox monitoring  Intermittent   Pulse Ox device source  Patient   O2 Sat comments  Patient reports O2 sat 91-94% on room air. Advised O2 ordered continuous,  advised to use O2 if O2 sat <92%.    O2 Sat: education provided  Sat levels, Monitoring frequency, When to seek  care   O2 Sat education comments  Advised for deep breathing exercises every 2 hours and to wear O2 as prescribed.    Is the patient/caregiver able to teach back steps to recovery at home?  Set small, achievable goals for return to baseline health, Rest and rebuild strength, gradually increase activity   Is the patient/caregiver able to teach back the hierarchy of who to call/visit for symptoms/problems? PCP, Specialist, Home health nurse, Urgent Care, ED, 911  Yes   TCM call completed?  Yes   Wrap up additional comments  Patient reports COVID + since 12/15. No longer on quarantine.           Tracy Santizo RN    12/30/2020, 14:59 EST

## 2020-12-31 ENCOUNTER — READMISSION MANAGEMENT (OUTPATIENT)
Dept: CALL CENTER | Facility: HOSPITAL | Age: 65
End: 2020-12-31

## 2020-12-31 NOTE — OUTREACH NOTE
COVID-19 Week 1 Survey      Responses   [facility] patient discharged from?  [facility]   Does the patient have one of the following disease processes/diagnoses(primary or secondary)?  COVID-19   COVID-19 underlying condition?  None   Call Number  Call 2   Week 1 Call successful?  Yes   Call start time  1428   Call end time  1442   Discharge diagnosis  Covid 19   Meds reviewed with patient/caregiver?  Yes   Is the patient having any side effects they believe may be caused by any medication additions or changes?  No   Does the patient have all medications ordered at discharge?  Yes   Is the patient taking all medications as directed (includes completed medication regime)?  Yes   Does the patient have a primary care provider?   Yes   Comments regarding PCP  PCP FOLLOW UP APPOINTMENT IS NEXT WEDNESDAY 1/6/21   Does the patient have an appointment with their PCP or specialist within 7 days of discharge?  Greater than 7 days   What is preventing the patient from scheduling follow up appointments within 7 days of discharge?  Earlier appointment not available   Nursing Interventions  Verified appointment date/time/provider   Has the patient kept scheduled appointments due by today?  N/A   Has home health visited the patient within 72 hours of discharge?  N/A   What DME was ordered?  Home O2 via [employer]   Has all DME been delivered?  Yes   DME comments  PATIENT IS ON 2L OF O2   Psychosocial issues?  No   Did the patient receive a copy of their discharge instructions?  Yes   Did the patient receive a copy of COVID-19 specific instructions?  Yes   Nursing interventions  Reviewed instructions with patient   What is the patient's perception of their health status since discharge?  Improving   Does the patient have any of the following symptoms?  Cough, Shortness of breath   Nursing Interventions  Nurse provided patient education   Pulse Ox monitoring  Intermittent   Pulse Ox device source  Patient   O2 Sat: education provided   Sat levels, Monitoring frequency, When to seek care   Is the patient/caregiver able to teach back steps to recovery at home?  Set small, achievable goals for return to baseline health, Rest and rebuild strength, gradually increase activity, Practice good oral hygiene, Eat a well-balance diet, Make a list of questions for provider's appointment   If the patient is a current smoker, are they able to teach back resources for cessation?  Not a smoker   Is the patient/caregiver able to teach back the hierarchy of who to call/visit for symptoms/problems? PCP, Specialist, Home health nurse, Urgent Care, ED, 911  Yes   COVID-19 call completed?  Yes          Ana Owen LPN

## 2021-01-02 ENCOUNTER — READMISSION MANAGEMENT (OUTPATIENT)
Dept: CALL CENTER | Facility: HOSPITAL | Age: 66
End: 2021-01-02

## 2021-01-02 NOTE — OUTREACH NOTE
COVID-19 Week 1 Survey      Responses   [facility] patient discharged from?  [facility]   Does the patient have one of the following disease processes/diagnoses(primary or secondary)?  COVID-19   COVID-19 underlying condition?  None   Call Number  Call 3   Week 1 Call successful?  No   Discharge diagnosis  Covid 19          Denise Zhao RN

## 2021-01-05 ENCOUNTER — READMISSION MANAGEMENT (OUTPATIENT)
Dept: CALL CENTER | Facility: HOSPITAL | Age: 66
End: 2021-01-05

## 2021-01-05 NOTE — OUTREACH NOTE
COVID-19 Week 1 Survey      Responses   [facility] patient discharged from?  [facility]   Does the patient have one of the following disease processes/diagnoses(primary or secondary)?  COVID-19   COVID-19 underlying condition?  None   Call Number  Call 4   Week 1 Call successful?  Yes   Call start time  1310   Call end time  1318   Discharge diagnosis  Covid 19   Comments  appt is tomorrow   Psychosocial issues?  No   What is the patient's perception of their health status since discharge?  Improving   Does the patient have any of the following symptoms?  Shortness of breath, Cough   Nursing Interventions  Nurse provided patient education   Pulse Ox monitoring  Intermittent   Pulse Ox device source  Patient   O2 Sat comments  running above 92%--on RA , using oxygen prn through the day when tired and AAT at night   O2 Sat: education provided  Sat levels, When to seek care   O2 Sat education comments  using IS often and enc. good pulmonary toilet.   Is the patient/caregiver able to teach back steps to recovery at home?  Rest and rebuild strength, gradually increase activity, Eat a well-balance diet   Is the patient/caregiver able to teach back the hierarchy of who to call/visit for symptoms/problems? PCP, Specialist, Home health nurse, Urgent Care, ED, 911  Yes   COVID-19 call completed?  Yes          Wendi Marte RN

## 2021-01-06 ENCOUNTER — LAB (OUTPATIENT)
Dept: FAMILY MEDICINE CLINIC | Facility: CLINIC | Age: 66
End: 2021-01-06

## 2021-01-06 ENCOUNTER — OFFICE VISIT (OUTPATIENT)
Dept: FAMILY MEDICINE CLINIC | Facility: CLINIC | Age: 66
End: 2021-01-06

## 2021-01-06 VITALS
BODY MASS INDEX: 36.44 KG/M2 | TEMPERATURE: 97 F | RESPIRATION RATE: 22 BRPM | WEIGHT: 198 LBS | HEART RATE: 94 BPM | SYSTOLIC BLOOD PRESSURE: 122 MMHG | OXYGEN SATURATION: 88 % | DIASTOLIC BLOOD PRESSURE: 68 MMHG | HEIGHT: 62 IN

## 2021-01-06 DIAGNOSIS — U07.1 ACUTE HYPOXEMIC RESPIRATORY FAILURE DUE TO COVID-19 (HCC): Primary | ICD-10-CM

## 2021-01-06 DIAGNOSIS — Z76.89 ENCOUNTER FOR SUPPORT AND COORDINATION OF TRANSITION OF CARE: ICD-10-CM

## 2021-01-06 DIAGNOSIS — U07.1 PNEUMONIA DUE TO COVID-19 VIRUS: ICD-10-CM

## 2021-01-06 DIAGNOSIS — U07.1 ACUTE HYPOXEMIC RESPIRATORY FAILURE DUE TO COVID-19 (HCC): ICD-10-CM

## 2021-01-06 DIAGNOSIS — J12.82 PNEUMONIA DUE TO COVID-19 VIRUS: ICD-10-CM

## 2021-01-06 DIAGNOSIS — U07.1 COVID-19 VIRUS INFECTION: ICD-10-CM

## 2021-01-06 DIAGNOSIS — J96.01 ACUTE HYPOXEMIC RESPIRATORY FAILURE DUE TO COVID-19 (HCC): Primary | ICD-10-CM

## 2021-01-06 DIAGNOSIS — J96.01 ACUTE HYPOXEMIC RESPIRATORY FAILURE DUE TO COVID-19 (HCC): ICD-10-CM

## 2021-01-06 LAB
ALBUMIN SERPL-MCNC: 3.9 G/DL (ref 3.5–5.2)
ALBUMIN/GLOB SERPL: 1.3 G/DL
ALP SERPL-CCNC: 87 U/L (ref 39–117)
ALT SERPL W P-5'-P-CCNC: 53 U/L (ref 1–33)
ANION GAP SERPL CALCULATED.3IONS-SCNC: 8.8 MMOL/L (ref 5–15)
AST SERPL-CCNC: 37 U/L (ref 1–32)
BASOPHILS # BLD AUTO: 0.03 10*3/MM3 (ref 0–0.2)
BASOPHILS NFR BLD AUTO: 0.5 % (ref 0–1.5)
BILIRUB SERPL-MCNC: 0.3 MG/DL (ref 0–1.2)
BUN SERPL-MCNC: 11 MG/DL (ref 8–23)
BUN/CREAT SERPL: 14.7 (ref 7–25)
CALCIUM SPEC-SCNC: 9.3 MG/DL (ref 8.6–10.5)
CHLORIDE SERPL-SCNC: 100 MMOL/L (ref 98–107)
CO2 SERPL-SCNC: 29.2 MMOL/L (ref 22–29)
CREAT SERPL-MCNC: 0.75 MG/DL (ref 0.57–1)
CRP SERPL-MCNC: 0.55 MG/DL (ref 0–0.5)
DEPRECATED RDW RBC AUTO: 43.3 FL (ref 37–54)
EOSINOPHIL # BLD AUTO: 0.11 10*3/MM3 (ref 0–0.4)
EOSINOPHIL NFR BLD AUTO: 1.7 % (ref 0.3–6.2)
ERYTHROCYTE [DISTWIDTH] IN BLOOD BY AUTOMATED COUNT: 13.6 % (ref 12.3–15.4)
GFR SERPL CREATININE-BSD FRML MDRD: 77 ML/MIN/1.73
GLOBULIN UR ELPH-MCNC: 3.1 GM/DL
GLUCOSE SERPL-MCNC: 102 MG/DL (ref 65–99)
HCT VFR BLD AUTO: 40.6 % (ref 34–46.6)
HGB BLD-MCNC: 13.2 G/DL (ref 12–15.9)
IMM GRANULOCYTES # BLD AUTO: 0.03 10*3/MM3 (ref 0–0.05)
IMM GRANULOCYTES NFR BLD AUTO: 0.5 % (ref 0–0.5)
LYMPHOCYTES # BLD AUTO: 1.29 10*3/MM3 (ref 0.7–3.1)
LYMPHOCYTES NFR BLD AUTO: 19.4 % (ref 19.6–45.3)
MCH RBC QN AUTO: 28.5 PG (ref 26.6–33)
MCHC RBC AUTO-ENTMCNC: 32.5 G/DL (ref 31.5–35.7)
MCV RBC AUTO: 87.7 FL (ref 79–97)
MONOCYTES # BLD AUTO: 0.52 10*3/MM3 (ref 0.1–0.9)
MONOCYTES NFR BLD AUTO: 7.8 % (ref 5–12)
NEUTROPHILS NFR BLD AUTO: 4.67 10*3/MM3 (ref 1.7–7)
NEUTROPHILS NFR BLD AUTO: 70.1 % (ref 42.7–76)
NRBC BLD AUTO-RTO: 0 /100 WBC (ref 0–0.2)
PLATELET # BLD AUTO: 219 10*3/MM3 (ref 140–450)
PMV BLD AUTO: 10.4 FL (ref 6–12)
POTASSIUM SERPL-SCNC: 4.3 MMOL/L (ref 3.5–5.2)
PROT SERPL-MCNC: 7 G/DL (ref 6–8.5)
RBC # BLD AUTO: 4.63 10*6/MM3 (ref 3.77–5.28)
SODIUM SERPL-SCNC: 138 MMOL/L (ref 136–145)
WBC # BLD AUTO: 6.65 10*3/MM3 (ref 3.4–10.8)

## 2021-01-06 PROCEDURE — 80053 COMPREHEN METABOLIC PANEL: CPT | Performed by: INTERNAL MEDICINE

## 2021-01-06 PROCEDURE — 85025 COMPLETE CBC W/AUTO DIFF WBC: CPT | Performed by: INTERNAL MEDICINE

## 2021-01-06 PROCEDURE — 86140 C-REACTIVE PROTEIN: CPT | Performed by: INTERNAL MEDICINE

## 2021-01-06 PROCEDURE — 36415 COLL VENOUS BLD VENIPUNCTURE: CPT

## 2021-01-06 PROCEDURE — 99495 TRANSJ CARE MGMT MOD F2F 14D: CPT | Performed by: INTERNAL MEDICINE

## 2021-01-06 PROCEDURE — 1111F DSCHRG MED/CURRENT MED MERGE: CPT | Performed by: INTERNAL MEDICINE

## 2021-01-06 RX ORDER — HYDROCORTISONE 25 MG/G
CREAM TOPICAL 2 TIMES DAILY
Qty: 28 G | Refills: 1 | Status: SHIPPED | OUTPATIENT
Start: 2021-01-06 | End: 2021-03-19

## 2021-01-06 RX ORDER — MULTIVIT WITH MINERALS/LUTEIN
250 TABLET ORAL DAILY
COMMUNITY
End: 2023-03-10

## 2021-01-06 NOTE — PROGRESS NOTES
Transitional Care Follow Up Visit  Subjective     Kitty Berry is a 65 y.o. female who presents for a transitional care management visit.    Within 48 business hours after discharge our office contacted her via telephone to coordinate her care and needs.      I reviewed and discussed the details of that call along with the discharge summary, hospital problems, inpatient lab results, inpatient diagnostic studies, and consultation reports with Kitty.     Current outpatient and discharge medications have been reconciled for the patient.  Reviewed by: Nadia Mcclure MD      Date of TCM Phone Call 12/29/2020   [facility]   Date of Admission 12/23/2020   Date of Discharge 12/29/2020   Discharge Disposition Home or Self Care     Risk for Readmission (LACE) Score: 9 (12/29/2020  6:00 AM)      History of Present Illness   Course During Hospital Stay:   Pt was admitted for   COVID on 12/23-for 6 days. Had antiviral. Required oxygen-  Was on 6 L and then  Down to 2L for home- using- O2 at night and then during the day as needed. GI still upset- will have lots of diarrhea with eating. Increased gas.   Not able to do steps yet.  Has large hemorrhoid- not bleeding.   Off BP meds as BP was very low.  Increased anxiety- checking O2 every 20-30 minutes. Been taking xanax Bid- and weaned down to nightly.       The following portions of the patient's history were reviewed and updated as appropriate: current medications, past family history, past medical history, past social history, past surgical history and problem list.    Review of Systems   Constitutional: Positive for fatigue. Negative for diaphoresis.   Respiratory: Positive for cough and shortness of breath.    Cardiovascular: Negative for chest pain.   Gastrointestinal: Negative for abdominal distention.   Musculoskeletal: Positive for myalgias. Negative for arthralgias.   Psychiatric/Behavioral: Positive for sleep disturbance.       /68   Pulse 94   Temp  "97 °F (36.1 °C)   Resp 22   Ht 157.5 cm (62\")   Wt 89.8 kg (198 lb)   SpO2 (!) 88%   BMI 36.21 kg/m²   Objective   Physical Exam  Vitals signs and nursing note reviewed.   Constitutional:       Appearance: Normal appearance. She is well-developed. She is obese.   HENT:      Head: Normocephalic and atraumatic.      Right Ear: Tympanic membrane normal.      Left Ear: Tympanic membrane normal.   Eyes:      Pupils: Pupils are equal, round, and reactive to light.   Neck:      Musculoskeletal: Normal range of motion and neck supple.   Cardiovascular:      Rate and Rhythm: Normal rate and regular rhythm.      Pulses: Normal pulses.      Heart sounds: Normal heart sounds. No murmur.   Pulmonary:      Effort: Pulmonary effort is normal.      Breath sounds: Normal breath sounds. No wheezing.   Abdominal:      General: Bowel sounds are normal. There is no distension.      Palpations: Abdomen is soft.   Musculoskeletal:         General: No tenderness.   Skin:     Capillary Refill: Capillary refill takes less than 2 seconds.      Findings: No erythema.   Neurological:      Mental Status: She is alert and oriented to person, place, and time.         Assessment/Plan   Diagnoses and all orders for this visit:    1. Acute hypoxemic respiratory failure due to COVID-19 (CMS/[facility]) (Primary)  -     Comprehensive Metabolic Panel  -     CBC Auto Differential  -     C-reactive protein; Future  -     CT Chest With Contrast; Future    2. COVID-19 virus infection    3. Encounter for support and coordination of transition of care  Assessment & Plan:  Reviewed all meds and procedures      4. Pneumonia due to COVID-19 virus  Assessment & Plan:  Resolving PNA- will check CT for scarring      Other orders  -     Hydrocortisone, Perianal, (ANUSOL-HC) 2.5 % rectal cream; Insert  into the rectum 2 (Two) Times a Day.  Dispense: 28 g; Refill: 1      They were informed of the diagnosis and treatment plan and directed to f/u for any further problems " or concerns.

## 2021-01-08 ENCOUNTER — READMISSION MANAGEMENT (OUTPATIENT)
Dept: CALL CENTER | Facility: HOSPITAL | Age: 66
End: 2021-01-08

## 2021-01-08 NOTE — OUTREACH NOTE
"COVID-19 Week 2 Survey      Responses   [facility] patient discharged from?  [facility]   Does the patient have one of the following disease processes/diagnoses(primary or secondary)?  COVID-19   COVID-19 underlying condition?  None   Call Number  Call 1   COVID-19 Week 2: Call 1 attempt successful?  Yes   Call start time  1240   Call end time  1247   Discharge diagnosis  Covid 19   Meds reviewed with patient/caregiver?  Yes   Is the patient taking all medications as directed (includes completed medication regime)?  Yes   Comments regarding appointments  Pt will have CT scan in a \"few weeks\" on her lungs   Has the patient kept scheduled appointments due by today?  Yes   DME comments  PCP advised patient to wear O2 more during the day and all night for the next 7-10 days.    What is the patient's perception of their health status since discharge?  Improving   Does the patient have any of the following symptoms?  Cough   Nursing Interventions  Nurse provided patient education   Pulse Ox monitoring  Intermittent   Pulse Ox device source  Patient   O2 Sat comments  94% on RA    Is the patient/caregiver able to teach back steps to recovery at home?  Rest and rebuild strength, gradually increase activity [Pt was advised by PCP to sleep on her stomach]   COVID-19 call completed?  Yes   Wrap up additional comments  Pt will have [place] with a grand child later today.          Verna Cantu RN  "

## 2021-01-11 DIAGNOSIS — E03.9 ACQUIRED HYPOTHYROIDISM: ICD-10-CM

## 2021-01-11 RX ORDER — LEVOTHYROXINE SODIUM 0.05 MG/1
TABLET ORAL
Qty: 90 TABLET | Refills: 2 | Status: SHIPPED | OUTPATIENT
Start: 2021-01-11 | End: 2021-10-17

## 2021-01-13 ENCOUNTER — READMISSION MANAGEMENT (OUTPATIENT)
Dept: CALL CENTER | Facility: HOSPITAL | Age: 66
End: 2021-01-13

## 2021-01-13 NOTE — OUTREACH NOTE
COVID-19 Week 3 Survey      Responses   [facility] patient discharged from?  [facility]   Does the patient have one of the following disease processes/diagnoses(primary or secondary)?  COVID-19   COVID-19 underlying condition?  None   Call Number  Call 1   COVID-19 Week 3: Call 1 attempt successful?  No   Discharge diagnosis  Covid 19          Amy Eisenberg RN

## 2021-01-19 ENCOUNTER — HOSPITAL ENCOUNTER (OUTPATIENT)
Dept: CT IMAGING | Facility: HOSPITAL | Age: 66
Discharge: HOME | End: 2021-01-19
Admitting: INTERNAL MEDICINE

## 2021-01-19 DIAGNOSIS — J96.01 ACUTE HYPOXEMIC RESPIRATORY FAILURE DUE TO COVID-19 (HCC): ICD-10-CM

## 2021-01-19 DIAGNOSIS — U07.1 ACUTE HYPOXEMIC RESPIRATORY FAILURE DUE TO COVID-19 (HCC): ICD-10-CM

## 2021-01-19 PROCEDURE — 71260 CT THORAX DX C+: CPT

## 2021-01-19 PROCEDURE — 0 IOPAMIDOL PER 1 ML: Performed by: INTERNAL MEDICINE

## 2021-01-19 RX ADMIN — IOPAMIDOL 100 ML: 755 INJECTION, SOLUTION INTRAVENOUS at 17:00

## 2021-01-20 ENCOUNTER — READMISSION MANAGEMENT (OUTPATIENT)
Dept: CALL CENTER | Facility: HOSPITAL | Age: 66
End: 2021-01-20

## 2021-01-20 NOTE — OUTREACH NOTE
COVID-19 Week 4 Survey      Responses   [facility] patient discharged from?  [facility]   Does the patient have one of the following disease processes/diagnoses(primary or secondary)?  COVID-19   COVID-19 underlying condition?  None   Call Number  Call 1   COVID-19 Week 4: Call 1 attempt successful?  No   Discharge diagnosis  Covid 19          Stephanie Curtis RN

## 2021-01-21 ENCOUNTER — TELEPHONE (OUTPATIENT)
Dept: FAMILY MEDICINE CLINIC | Facility: CLINIC | Age: 66
End: 2021-01-21

## 2021-01-22 ENCOUNTER — TELEPHONE (OUTPATIENT)
Dept: FAMILY MEDICINE CLINIC | Facility: CLINIC | Age: 66
End: 2021-01-22

## 2021-01-22 NOTE — TELEPHONE ENCOUNTER
PATIENT HAD CT SCAN DONE ON 1/19/21.  PATIENT HAS CALLED MEDICAL RECORDS, WHICH SHOWS THAT CT SCAN.  DOCTOR MINRATH TO CALL MEDICAL RECORDS TO OBTAIN THE SCAN.  THEY WILL FAX THE REPORT OVER.  PLEASE ADVISE    PATIENT CALL BACK [phone]

## 2021-01-22 NOTE — TELEPHONE ENCOUNTER
Shows cOVID changes with appearance of pulmonary fibrosis- ( stiffness in outer area of lungs) many still improve over next 3 months- so will watch and recheck in few months, but will need inhaler.

## 2021-01-30 PROBLEM — Z76.89 ENCOUNTER FOR SUPPORT AND COORDINATION OF TRANSITION OF CARE: Status: ACTIVE | Noted: 2021-01-30

## 2021-01-30 PROBLEM — J12.82 PNEUMONIA DUE TO COVID-19 VIRUS: Status: ACTIVE | Noted: 2021-01-30

## 2021-01-30 PROBLEM — U07.1 PNEUMONIA DUE TO COVID-19 VIRUS: Status: ACTIVE | Noted: 2021-01-30

## 2021-02-03 ENCOUNTER — OFFICE VISIT (OUTPATIENT)
Dept: FAMILY MEDICINE CLINIC | Facility: CLINIC | Age: 66
End: 2021-02-03

## 2021-02-03 VITALS
RESPIRATION RATE: 17 BRPM | SYSTOLIC BLOOD PRESSURE: 120 MMHG | TEMPERATURE: 98.2 F | WEIGHT: 198 LBS | HEIGHT: 62 IN | BODY MASS INDEX: 36.44 KG/M2 | HEART RATE: 97 BPM | OXYGEN SATURATION: 98 % | DIASTOLIC BLOOD PRESSURE: 76 MMHG

## 2021-02-03 DIAGNOSIS — U07.1 COVID-19 VIRUS INFECTION: ICD-10-CM

## 2021-02-03 DIAGNOSIS — U07.1 ACUTE HYPOXEMIC RESPIRATORY FAILURE DUE TO COVID-19 (HCC): Primary | ICD-10-CM

## 2021-02-03 DIAGNOSIS — J96.01 ACUTE HYPOXEMIC RESPIRATORY FAILURE DUE TO COVID-19 (HCC): Primary | ICD-10-CM

## 2021-02-03 PROCEDURE — 99214 OFFICE O/P EST MOD 30 MIN: CPT | Performed by: INTERNAL MEDICINE

## 2021-02-03 NOTE — PROGRESS NOTES
"Rooming Tab(CC,VS,Pt Hx,Fall Screen)  Chief Complaint   Patient presents with   • Follow-up       Subjective   Pt here for continued COVID symptoms-  Weaning down on when she needs the oxygen- still most nights but not as much during the day    doing much more  Activity now- went to [facility] and [facility] on the same day. Starting to do some cooking. Taking the stairs at home    did ride in cart at [facility]'s   still doing albuterol 1/day and then rescue- but hasn't been wheezing.   k CT in 2 months .  I/S still at 1500    very worried as read about pulmonary fibrosis      I have reviewed and updated her medications, medical history and problem list during today's office visit.     Patient Care Team:  Nadia Mcclure MD as PCP - General    Problem List Tab  Medications Tab  Synopsis Tab  Chart Review Tab  Care Everywhere Tab  Immunizations Tab  Patient History Tab    Social History     Tobacco Use   • Smoking status: Former Smoker     Types: Cigarettes     Quit date:      Years since quittin.1   • Smokeless tobacco: Never Used   Substance Use Topics   • Alcohol use: Yes       Review of Systems    Objective     Rooming Tab(CC,VS,Pt Hx,Fall Screen)  /76   Pulse 97   Temp 98.2 °F (36.8 °C)   Resp 17   Ht 157.5 cm (62\")   Wt 89.8 kg (198 lb)   SpO2 98%   BMI 36.21 kg/m²     Body mass index is 36.21 kg/m².    Physical Exam  Vitals signs and nursing note reviewed.   Constitutional:       Appearance: Normal appearance. She is well-developed.   HENT:      Head: Normocephalic and atraumatic.      Right Ear: Tympanic membrane normal.      Left Ear: Tympanic membrane normal.   Eyes:      Pupils: Pupils are equal, round, and reactive to light.   Neck:      Musculoskeletal: Normal range of motion and neck supple.   Cardiovascular:      Rate and Rhythm: Normal rate and regular rhythm.      Pulses: Normal pulses.      Heart sounds: Murmur present.   Pulmonary:      Effort: Pulmonary effort is normal.      Breath " sounds: Normal breath sounds.   Abdominal:      General: Bowel sounds are normal. There is no distension.      Palpations: Abdomen is soft.   Musculoskeletal:         General: No tenderness.   Skin:     Capillary Refill: Capillary refill takes less than 2 seconds.   Neurological:      Mental Status: She is alert and oriented to person, place, and time.          Statin Choice Calculator  Data Reviewed:    Ct Chest With Contrast Diagnostic    Result Date: 1/19/2021  Impression: Mixed peripheral groundglass and alveolar densities in both lungs. The findings could reflect changes of evolving pulmonary fibrosis. Mild residual pneumonitis or pneumonia could have a similar imaging appearance.    Electronically Signed By-Omaira Matos MD On:1/19/2021 4:12 PM This report was finalized on [id number] by  Omaira Matos MD.      The data below has been reviewed by Nadia Mcclure MD on 02/03/2021.      Lab Results   Component Value Date    BUN 11 01/06/2021    CREATININE 0.75 01/06/2021    EGFRIFNONA 77 01/06/2021     01/06/2021    K 4.3 01/06/2021     01/06/2021    CALCIUM 9.3 01/06/2021    ALBUMIN 3.90 01/06/2021    BILITOT 0.3 01/06/2021    ALKPHOS 87 01/06/2021    AST 37 (H) 01/06/2021    ALT 53 (H) 01/06/2021    WBC 6.65 01/06/2021    RBC 4.63 01/06/2021    HCT 40.6 01/06/2021    MCV 87.7 01/06/2021    MCH 28.5 01/06/2021    TSH 0.753 12/23/2020    FREET4 1.31 12/01/2020      Assessment/Plan   Order Review Tab  Health Maintenance Tab  Patient Plan/Order Tab  Diagnoses and all orders for this visit:    1. Acute hypoxemic respiratory failure due to COVID-19 (CMS/HCC) (Primary)  Comments:  greatly improved- will see about overnight oximetry and if can wean off the oxygen all together. stay on sprivia for now  Orders:  -     Overnight Sleep Oximetry Study; Future    2. COVID-19 virus infection  Comments:  improving  Orders:  -     Overnight Sleep Oximetry Study; Future        Wrapup Tab  Return in about  4 weeks (around 3/3/2021), or if symptoms worsen or fail to improve.       They were informed of the diagnosis and treatment plan and directed to f/u for any further problems or concerns.       rechec   still doing albuterol 1/day and then rescue- but hasn't been wheezing.   k CT in 2 months .

## 2021-02-06 PROBLEM — U07.1 ACUTE HYPOXEMIC RESPIRATORY FAILURE DUE TO COVID-19 (HCC): Status: ACTIVE | Noted: 2021-02-06

## 2021-02-06 PROBLEM — J96.01 ACUTE HYPOXEMIC RESPIRATORY FAILURE DUE TO COVID-19: Status: ACTIVE | Noted: 2021-02-06

## 2021-02-16 DIAGNOSIS — U07.1 ACUTE HYPOXEMIC RESPIRATORY FAILURE DUE TO COVID-19 (HCC): ICD-10-CM

## 2021-02-16 DIAGNOSIS — U07.1 COVID-19 VIRUS INFECTION: ICD-10-CM

## 2021-02-16 DIAGNOSIS — J96.01 ACUTE HYPOXEMIC RESPIRATORY FAILURE DUE TO COVID-19 (HCC): ICD-10-CM

## 2021-02-17 NOTE — PROGRESS NOTES
You spent 49% of the time with o2sats < 90%.  And 13% less than 88%.  Please continue to use oxygen at night.  If doing well do not need it during the day, but still need at night.  Thank you

## 2021-02-18 ENCOUNTER — PATIENT MESSAGE (OUTPATIENT)
Dept: FAMILY MEDICINE CLINIC | Facility: CLINIC | Age: 66
End: 2021-02-18

## 2021-03-19 ENCOUNTER — TELEPHONE (OUTPATIENT)
Dept: FAMILY MEDICINE CLINIC | Facility: CLINIC | Age: 66
End: 2021-03-19

## 2021-03-19 ENCOUNTER — OFFICE VISIT (OUTPATIENT)
Dept: FAMILY MEDICINE CLINIC | Facility: CLINIC | Age: 66
End: 2021-03-19

## 2021-03-19 VITALS
OXYGEN SATURATION: 97 % | RESPIRATION RATE: 10 BRPM | HEIGHT: 62 IN | DIASTOLIC BLOOD PRESSURE: 82 MMHG | BODY MASS INDEX: 36.73 KG/M2 | WEIGHT: 199.6 LBS | TEMPERATURE: 98.1 F | HEART RATE: 83 BPM | SYSTOLIC BLOOD PRESSURE: 120 MMHG

## 2021-03-19 DIAGNOSIS — J96.01 ACUTE HYPOXEMIC RESPIRATORY FAILURE DUE TO COVID-19 (HCC): Primary | ICD-10-CM

## 2021-03-19 DIAGNOSIS — U07.1 ACUTE HYPOXEMIC RESPIRATORY FAILURE DUE TO COVID-19 (HCC): Primary | ICD-10-CM

## 2021-03-19 PROCEDURE — 99212 OFFICE O/P EST SF 10 MIN: CPT | Performed by: INTERNAL MEDICINE

## 2021-03-19 NOTE — TELEPHONE ENCOUNTER
Caller: Kitty Berry    Relationship to patient: Self    Best call back number: [phone]   Patient is needing:     MS. BERRY WOULD LIKE TO KNOW IF HER  IS ABLE TO COME IN ROOM DURING HER APPOINTMENT TODAY 3/19/2021 @ 2 PM.     PLEASE ADVISE

## 2021-03-19 NOTE — PROGRESS NOTES
"Rooming Tab(CC,VS,Pt Hx,Fall Screen)  Chief Complaint   Patient presents with   • hypoxemic       Subjective   Pt here for COVID post symptoms- the energy level is much better- able to watch grandkids and do some shopping/house cleaning without issues. Using O2 at night- sleeping well. Fatigue is improving no cough, no wheezing. Still using symibort BID  Looks good today, here with     I have reviewed and updated her medications, medical history and problem list during today's office visit.     Patient Care Team:  Nadia Mcclure MD as PCP - General    Problem List Tab  Medications Tab  Synopsis Tab  Chart Review Tab  Care Everywhere Tab  Immunizations Tab  Patient History Tab    Social History     Tobacco Use   • Smoking status: Former Smoker     Types: Cigarettes     Quit date:      Years since quittin.2   • Smokeless tobacco: Never Used   Substance Use Topics   • Alcohol use: Yes       Review of Systems    Objective     Rooming Tab(CC,VS,Pt Hx,Fall Screen)  /82   Pulse 83   Temp 98.1 °F (36.7 °C)   Resp 10   Ht 157.5 cm (62\")   Wt 90.5 kg (199 lb 9.6 oz)   SpO2 97%   BMI 36.51 kg/m²     Body mass index is 36.51 kg/m².    Physical Exam  Vitals and nursing note reviewed.   Constitutional:       Appearance: Normal appearance. She is well-developed.   HENT:      Head: Normocephalic and atraumatic.      Right Ear: Tympanic membrane normal.      Left Ear: Tympanic membrane normal.      Nose: No rhinorrhea.      Mouth/Throat:      Pharynx: No posterior oropharyngeal erythema.   Eyes:      Pupils: Pupils are equal, round, and reactive to light.   Cardiovascular:      Rate and Rhythm: Normal rate and regular rhythm.      Pulses: Normal pulses.      Heart sounds: Normal heart sounds. No murmur heard.     Pulmonary:      Effort: Pulmonary effort is normal.      Breath sounds: Normal breath sounds.   Abdominal:      General: Bowel sounds are normal. There is no distension.      Palpations: " Abdomen is soft.   Musculoskeletal:         General: No tenderness.      Cervical back: Normal range of motion and neck supple.   Skin:     Capillary Refill: Capillary refill takes less than 2 seconds.   Neurological:      Mental Status: She is alert and oriented to person, place, and time.   Psychiatric:         Mood and Affect: Mood normal.         Behavior: Behavior normal.          Statin Choice Calculator  Data Reviewed:         The data below has been reviewed by Nadia Mcclure MD on 03/19/2021.      Lab Results   Component Value Date    BUN 11 01/06/2021    CREATININE 0.75 01/06/2021    EGFRIFNONA 77 01/06/2021     01/06/2021    K 4.3 01/06/2021     01/06/2021    CALCIUM 9.3 01/06/2021    ALBUMIN 3.90 01/06/2021    BILITOT 0.3 01/06/2021    ALKPHOS 87 01/06/2021    AST 37 (H) 01/06/2021    ALT 53 (H) 01/06/2021    WBC 6.65 01/06/2021    RBC 4.63 01/06/2021    HCT 40.6 01/06/2021    MCV 87.7 01/06/2021    MCH 28.5 01/06/2021    TSH 0.753 12/23/2020      Assessment/Plan   Order Review Tab  Health Maintenance Tab  Patient Plan/Order Tab  Diagnoses and all orders for this visit:    1. Acute hypoxemic respiratory failure due to COVID-19 (CMS/[facility]) (Primary)  Assessment & Plan:  pt doing so much better- able to do much more normal life without getting fatigued. did 6 minute walk and lowest was 92%- will rechek CT in APril and can probably turn in O2 at that point- currently will keep O2 at night only     Orders:  -     CT Chest With Contrast; Future  -     Walking Oximetry; Future      Wrapup Tab  Return if symptoms worsen or fail to improve.       They were informed of the diagnosis and treatment plan and directed to f/u for any further problems or concerns.     no O2 needed in daytimes- still with the O2 at night- repeat CT in April and will decide then if can get rid of spiriva and nighttime O2

## 2021-03-22 NOTE — ASSESSMENT & PLAN NOTE
pt doing so much better- able to do much more normal life without getting fatigued. did 6 minute walk and lowest was 92%- will rechek CT in APril and can probably turn in O2 at that point- currently will keep O2 at night only

## 2021-04-06 ENCOUNTER — HOSPITAL ENCOUNTER (OUTPATIENT)
Dept: CT IMAGING | Facility: HOSPITAL | Age: 66
Discharge: HOME | End: 2021-04-06
Admitting: INTERNAL MEDICINE

## 2021-04-06 DIAGNOSIS — J96.01 ACUTE HYPOXEMIC RESPIRATORY FAILURE DUE TO COVID-19 (HCC): ICD-10-CM

## 2021-04-06 DIAGNOSIS — U07.1 ACUTE HYPOXEMIC RESPIRATORY FAILURE DUE TO COVID-19 (HCC): ICD-10-CM

## 2021-04-06 LAB — CREAT BLDA-MCNC: 0.8 MG/DL (ref 0.6–1.3)

## 2021-04-06 PROCEDURE — 0 IOPAMIDOL PER 1 ML: Performed by: INTERNAL MEDICINE

## 2021-04-06 PROCEDURE — 71260 CT THORAX DX C+: CPT

## 2021-04-06 PROCEDURE — 82565 ASSAY OF CREATININE: CPT

## 2021-04-06 RX ADMIN — IOPAMIDOL 100 ML: 755 INJECTION, SOLUTION INTRAVENOUS at 13:30

## 2021-04-07 ENCOUNTER — PATIENT MESSAGE (OUTPATIENT)
Dept: FAMILY MEDICINE CLINIC | Facility: CLINIC | Age: 66
End: 2021-04-07

## 2021-04-07 DIAGNOSIS — I51.7 CARDIOMEGALY: Primary | ICD-10-CM

## 2021-04-07 DIAGNOSIS — U09.9 POST-COVID SYNDROME: ICD-10-CM

## 2021-04-07 NOTE — TELEPHONE ENCOUNTER
From: Kitty Berry  To: Nadia Mcclure MD  Sent: 4/7/2021 10:34 AM EDT  Subject: Non-Urgent Medical Question    Hi Dr Mcclure,   I saw your comments and test results from CT scan yesterday. I have a couple questions.   1. I am very pleased with results. I did not use the oxygen last night but did keep my oximeter on my finger most of the night to check levels. I would check it when I rolled over, etc, and a few times it was flashing 88 or 85 at me, but mostly in the 90+ range. Are the low #’s my sleep apnea and should I be concerned? Also, do I or your office contact the oxygen company to come get the equipment?  2. Do I need to see you in Sept for yearly exam?  3. Should I be concerned about the enlarged heart and fatty liver at this point? I know eating healthier and loosing weight will help but anything else?  Thank you!  Kitty Berry

## 2021-04-07 NOTE — PROGRESS NOTES
CT with nearly resolved interstitial disease , no clots or nodules.  Can stop oxygen at night and stop inhaler now

## 2021-04-16 ENCOUNTER — HOSPITAL ENCOUNTER (OUTPATIENT)
Dept: CARDIOLOGY | Facility: HOSPITAL | Age: 66
Discharge: HOME | End: 2021-04-16
Admitting: INTERNAL MEDICINE

## 2021-04-16 VITALS
HEIGHT: 62 IN | SYSTOLIC BLOOD PRESSURE: 135 MMHG | BODY MASS INDEX: 36.62 KG/M2 | WEIGHT: 199 LBS | DIASTOLIC BLOOD PRESSURE: 84 MMHG

## 2021-04-16 DIAGNOSIS — U09.9 POST-COVID SYNDROME: ICD-10-CM

## 2021-04-16 DIAGNOSIS — I51.7 CARDIOMEGALY: ICD-10-CM

## 2021-04-16 LAB
BH CV ECHO MEAS - % IVS THICK: 43 %
BH CV ECHO MEAS - % LVPW THICK: 34.3 %
BH CV ECHO MEAS - ACS: 2.1 CM
BH CV ECHO MEAS - AO MAX PG (FULL): -0.03 MMHG
BH CV ECHO MEAS - AO MAX PG: 3.3 MMHG
BH CV ECHO MEAS - AO MEAN PG (FULL): 2 MMHG
BH CV ECHO MEAS - AO MEAN PG: 3.9 MMHG
BH CV ECHO MEAS - AO ROOT AREA (BSA CORRECTED): 1.6
BH CV ECHO MEAS - AO ROOT AREA: 6.9 CM^2
BH CV ECHO MEAS - AO ROOT DIAM: 3 CM
BH CV ECHO MEAS - AO V2 MAX: 64.5 CM/SEC
BH CV ECHO MEAS - AO V2 MEAN: 94.4 CM/SEC
BH CV ECHO MEAS - AO V2 VTI: 23.9 CM
BH CV ECHO MEAS - AVA(I,A): 2.4 CM^2
BH CV ECHO MEAS - AVA(I,D): 2.4 CM^2
BH CV ECHO MEAS - AVA(V,A): 4.3 CM^2
BH CV ECHO MEAS - AVA(V,D): 4.3 CM^2
BH CV ECHO MEAS - BSA(HAYCOCK): 2 M^2
BH CV ECHO MEAS - BSA: 1.9 M^2
BH CV ECHO MEAS - BZI_BMI: 36.4 KILOGRAMS/M^2
BH CV ECHO MEAS - BZI_METRIC_HEIGHT: 157.5 CM
BH CV ECHO MEAS - BZI_METRIC_WEIGHT: 90.3 KG
BH CV ECHO MEAS - EDV(CUBED): 114.8 ML
BH CV ECHO MEAS - EDV(MOD-SP2): 33.6 ML
BH CV ECHO MEAS - EDV(MOD-SP4): 68.2 ML
BH CV ECHO MEAS - EDV(TEICH): 110.7 ML
BH CV ECHO MEAS - EF(CUBED): 67.8 %
BH CV ECHO MEAS - EF(MOD-BP): 52 %
BH CV ECHO MEAS - EF(MOD-SP4): 52.2 %
BH CV ECHO MEAS - EF(TEICH): 59.2 %
BH CV ECHO MEAS - ESV(CUBED): 37 ML
BH CV ECHO MEAS - ESV(MOD-SP4): 32.6 ML
BH CV ECHO MEAS - ESV(TEICH): 45.2 ML
BH CV ECHO MEAS - FS: 31.5 %
BH CV ECHO MEAS - IVS/LVPW: 1.1
BH CV ECHO MEAS - IVSD: 1.1 CM
BH CV ECHO MEAS - IVSS: 1.5 CM
BH CV ECHO MEAS - LA DIMENSION(2D): 3.1 CM
BH CV ECHO MEAS - LV DIASTOLIC VOL/BSA (35-75): 35.8 ML/M^2
BH CV ECHO MEAS - LV MASS(C)D: 179.3 GRAMS
BH CV ECHO MEAS - LV MASS(C)DI: 94 GRAMS/M^2
BH CV ECHO MEAS - LV MASS(C)S: 165.7 GRAMS
BH CV ECHO MEAS - LV MASS(C)SI: 86.9 GRAMS/M^2
BH CV ECHO MEAS - LV MAX PG: 3.3 MMHG
BH CV ECHO MEAS - LV MEAN PG: 1.9 MMHG
BH CV ECHO MEAS - LV SYSTOLIC VOL/BSA (12-30): 17.1 ML/M^2
BH CV ECHO MEAS - LV V1 MAX: 91.2 CM/SEC
BH CV ECHO MEAS - LV V1 MEAN: 63.5 CM/SEC
BH CV ECHO MEAS - LV V1 VTI: 18.4 CM
BH CV ECHO MEAS - LVIDD: 4.9 CM
BH CV ECHO MEAS - LVIDS: 3.3 CM
BH CV ECHO MEAS - LVOT AREA: 3 CM^2
BH CV ECHO MEAS - LVOT DIAM: 2 CM
BH CV ECHO MEAS - LVPWD: 0.98 CM
BH CV ECHO MEAS - LVPWS: 1.3 CM
BH CV ECHO MEAS - MV A MAX VEL: 76.1 CM/SEC
BH CV ECHO MEAS - MV DEC SLOPE: 295.1 CM/SEC^2
BH CV ECHO MEAS - MV DEC TIME: 0.23 SEC
BH CV ECHO MEAS - MV E MAX VEL: 35 CM/SEC
BH CV ECHO MEAS - MV E/A: 0.46
BH CV ECHO MEAS - MV MAX PG: 3.7 MMHG
BH CV ECHO MEAS - MV MEAN PG: 1.4 MMHG
BH CV ECHO MEAS - MV V2 MAX: 95.9 CM/SEC
BH CV ECHO MEAS - MV V2 MEAN: 53.6 CM/SEC
BH CV ECHO MEAS - MV V2 VTI: 26.4 CM
BH CV ECHO MEAS - MVA(VTI): 2.1 CM^2
BH CV ECHO MEAS - PA ACC TIME: 0.09 SEC
BH CV ECHO MEAS - PA MAX PG (FULL): 0.98 MMHG
BH CV ECHO MEAS - PA MAX PG: 2.5 MMHG
BH CV ECHO MEAS - PA MEAN PG (FULL): 0.46 MMHG
BH CV ECHO MEAS - PA MEAN PG: 1.2 MMHG
BH CV ECHO MEAS - PA PR(ACCEL): 38.5 MMHG
BH CV ECHO MEAS - PA V2 MAX: 79.6 CM/SEC
BH CV ECHO MEAS - PA V2 MEAN: 52 CM/SEC
BH CV ECHO MEAS - PA V2 VTI: 13.5 CM
BH CV ECHO MEAS - RV MAX PG: 1.6 MMHG
BH CV ECHO MEAS - RV MEAN PG: 0.76 MMHG
BH CV ECHO MEAS - RV V1 MAX: 62.4 CM/SEC
BH CV ECHO MEAS - RV V1 MEAN: 41.5 CM/SEC
BH CV ECHO MEAS - RV V1 VTI: 13.1 CM
BH CV ECHO MEAS - RVDD: 2.9 CM
BH CV ECHO MEAS - SI(AO): 86.3 ML/M^2
BH CV ECHO MEAS - SI(CUBED): 40.8 ML/M^2
BH CV ECHO MEAS - SI(LVOT): 29.4 ML/M^2
BH CV ECHO MEAS - SI(MOD-SP4): 18.7 ML/M^2
BH CV ECHO MEAS - SI(TEICH): 34.4 ML/M^2
BH CV ECHO MEAS - SV(AO): 164.6 ML
BH CV ECHO MEAS - SV(CUBED): 77.8 ML
BH CV ECHO MEAS - SV(LVOT): 56.2 ML
BH CV ECHO MEAS - SV(MOD-SP4): 35.6 ML
BH CV ECHO MEAS - SV(TEICH): 65.5 ML
LV EF 2D ECHO EST: 60 %
MAXIMAL PREDICTED HEART RATE: 153 BPM
STRESS TARGET HR: 130 BPM

## 2021-04-16 PROCEDURE — 93306 TTE W/DOPPLER COMPLETE: CPT

## 2021-04-16 PROCEDURE — 93306 TTE W/DOPPLER COMPLETE: CPT | Performed by: INTERNAL MEDICINE

## 2021-05-05 RX ORDER — MELOXICAM 15 MG/1
TABLET ORAL
Qty: 30 TABLET | Refills: 2 | Status: SHIPPED | OUTPATIENT
Start: 2021-05-05 | End: 2021-08-02

## 2021-08-02 ENCOUNTER — TRANSCRIBE ORDERS (OUTPATIENT)
Dept: ADMINISTRATIVE | Facility: HOSPITAL | Age: 66
End: 2021-08-02

## 2021-08-02 ENCOUNTER — TELEPHONE (OUTPATIENT)
Dept: FAMILY MEDICINE CLINIC | Facility: CLINIC | Age: 66
End: 2021-08-02

## 2021-08-02 DIAGNOSIS — Z12.31 SCREENING MAMMOGRAM, ENCOUNTER FOR: Primary | ICD-10-CM

## 2021-08-02 DIAGNOSIS — E55.9 VITAMIN D DEFICIENCY: ICD-10-CM

## 2021-08-02 DIAGNOSIS — I10 ESSENTIAL HYPERTENSION: Primary | ICD-10-CM

## 2021-08-02 DIAGNOSIS — E03.9 ACQUIRED HYPOTHYROIDISM: ICD-10-CM

## 2021-08-02 RX ORDER — MELOXICAM 15 MG/1
TABLET ORAL
Qty: 30 TABLET | Refills: 2 | Status: SHIPPED | OUTPATIENT
Start: 2021-08-02 | End: 2021-11-01

## 2021-08-02 NOTE — TELEPHONE ENCOUNTER
Caller: Kitty Berry    Relationship: Self    Best call back number: [phone]    What orders are you requesting (i.e. lab or imaging): BLOOD WORK BEFORE MEDICARE WELLNESS VISIT     In what timeframe would the patient need to come in: ASAP    Where will you receive your lab/imaging services: OFFICE    Additional notes: PATIENT SCHEDULED A MEDICARE WELLNESS FOR 11/30/21 AND SAID THAT SHE WOULD LIKE TO HAVE BLOOD WORK DONE BEFORE HAND

## 2021-08-06 DIAGNOSIS — F41.9 ANXIETY: ICD-10-CM

## 2021-08-06 RX ORDER — ALPRAZOLAM 0.5 MG/1
TABLET ORAL
Qty: 60 TABLET | Refills: 1 | Status: SHIPPED | OUTPATIENT
Start: 2021-08-06 | End: 2022-01-14 | Stop reason: SDUPTHER

## 2021-08-12 ENCOUNTER — HOSPITAL ENCOUNTER (OUTPATIENT)
Dept: MAMMOGRAPHY | Facility: HOSPITAL | Age: 66
Discharge: HOME | End: 2021-08-12
Admitting: INTERNAL MEDICINE

## 2021-08-12 DIAGNOSIS — Z12.31 SCREENING MAMMOGRAM, ENCOUNTER FOR: ICD-10-CM

## 2021-08-12 PROCEDURE — 77063 BREAST TOMOSYNTHESIS BI: CPT

## 2021-08-12 PROCEDURE — 77067 SCR MAMMO BI INCL CAD: CPT

## 2021-10-04 ENCOUNTER — TRANSCRIBE ORDERS (OUTPATIENT)
Dept: ADMINISTRATIVE | Facility: HOSPITAL | Age: 66
End: 2021-10-04

## 2021-10-04 DIAGNOSIS — M75.101 NONTRAUMATIC TEAR OF RIGHT ROTATOR CUFF, UNSPECIFIED TEAR EXTENT: Primary | ICD-10-CM

## 2021-10-16 DIAGNOSIS — E03.9 ACQUIRED HYPOTHYROIDISM: ICD-10-CM

## 2021-10-17 RX ORDER — LEVOTHYROXINE SODIUM 0.05 MG/1
TABLET ORAL
Qty: 90 TABLET | Refills: 2 | Status: SHIPPED | OUTPATIENT
Start: 2021-10-17 | End: 2022-01-14 | Stop reason: SDUPTHER

## 2021-10-29 ENCOUNTER — APPOINTMENT (OUTPATIENT)
Dept: MRI IMAGING | Facility: HOSPITAL | Age: 66
End: 2021-10-29

## 2021-11-01 RX ORDER — MELOXICAM 15 MG/1
TABLET ORAL
Qty: 30 TABLET | Refills: 2 | Status: SHIPPED | OUTPATIENT
Start: 2021-11-01 | End: 2022-01-14 | Stop reason: SDUPTHER

## 2021-11-05 ENCOUNTER — HOSPITAL ENCOUNTER (OUTPATIENT)
Dept: MRI IMAGING | Facility: HOSPITAL | Age: 66
Discharge: HOME | End: 2021-11-05

## 2021-11-05 DIAGNOSIS — M75.101 NONTRAUMATIC TEAR OF RIGHT ROTATOR CUFF, UNSPECIFIED TEAR EXTENT: ICD-10-CM

## 2021-11-08 ENCOUNTER — HOSPITAL ENCOUNTER (OUTPATIENT)
Dept: GENERAL RADIOLOGY | Facility: HOSPITAL | Age: 66
Discharge: HOME | End: 2021-11-08
Admitting: HOSPITALIST

## 2021-11-08 ENCOUNTER — LAB (OUTPATIENT)
Dept: FAMILY MEDICINE CLINIC | Facility: CLINIC | Age: 66
End: 2021-11-08

## 2021-11-08 ENCOUNTER — OFFICE VISIT (OUTPATIENT)
Dept: FAMILY MEDICINE CLINIC | Facility: CLINIC | Age: 66
End: 2021-11-08

## 2021-11-08 VITALS
HEART RATE: 98 BPM | BODY MASS INDEX: 37.54 KG/M2 | OXYGEN SATURATION: 96 % | RESPIRATION RATE: 16 BRPM | SYSTOLIC BLOOD PRESSURE: 140 MMHG | WEIGHT: 204 LBS | TEMPERATURE: 96.8 F | DIASTOLIC BLOOD PRESSURE: 90 MMHG | HEIGHT: 62 IN

## 2021-11-08 DIAGNOSIS — R06.02 SHORT OF BREATH ON EXERTION: Primary | ICD-10-CM

## 2021-11-08 DIAGNOSIS — E03.9 ACQUIRED HYPOTHYROIDISM: ICD-10-CM

## 2021-11-08 DIAGNOSIS — Z13.6 ENCOUNTER FOR SCREENING FOR CARDIOVASCULAR DISORDERS: ICD-10-CM

## 2021-11-08 DIAGNOSIS — R06.02 SHORT OF BREATH ON EXERTION: ICD-10-CM

## 2021-11-08 DIAGNOSIS — E55.9 VITAMIN D DEFICIENCY: ICD-10-CM

## 2021-11-08 DIAGNOSIS — I10 ESSENTIAL HYPERTENSION: ICD-10-CM

## 2021-11-08 PROCEDURE — 87086 URINE CULTURE/COLONY COUNT: CPT

## 2021-11-08 PROCEDURE — 84443 ASSAY THYROID STIM HORMONE: CPT | Performed by: INTERNAL MEDICINE

## 2021-11-08 PROCEDURE — 36415 COLL VENOUS BLD VENIPUNCTURE: CPT

## 2021-11-08 PROCEDURE — 71046 X-RAY EXAM CHEST 2 VIEWS: CPT

## 2021-11-08 PROCEDURE — 80061 LIPID PANEL: CPT | Performed by: INTERNAL MEDICINE

## 2021-11-08 PROCEDURE — 93000 ELECTROCARDIOGRAM COMPLETE: CPT | Performed by: HOSPITALIST

## 2021-11-08 PROCEDURE — 80053 COMPREHEN METABOLIC PANEL: CPT | Performed by: INTERNAL MEDICINE

## 2021-11-08 PROCEDURE — 82306 VITAMIN D 25 HYDROXY: CPT | Performed by: INTERNAL MEDICINE

## 2021-11-08 PROCEDURE — 81001 URINALYSIS AUTO W/SCOPE: CPT | Performed by: INTERNAL MEDICINE

## 2021-11-08 PROCEDURE — 83880 ASSAY OF NATRIURETIC PEPTIDE: CPT | Performed by: INTERNAL MEDICINE

## 2021-11-08 PROCEDURE — 99214 OFFICE O/P EST MOD 30 MIN: CPT | Performed by: HOSPITALIST

## 2021-11-08 PROCEDURE — 85025 COMPLETE CBC W/AUTO DIFF WBC: CPT | Performed by: INTERNAL MEDICINE

## 2021-11-08 PROCEDURE — 84439 ASSAY OF FREE THYROXINE: CPT | Performed by: INTERNAL MEDICINE

## 2021-11-08 RX ORDER — ISOSORBIDE MONONITRATE 30 MG/1
30 TABLET, EXTENDED RELEASE ORAL DAILY
Qty: 30 TABLET | Refills: 2 | Status: SHIPPED | OUTPATIENT
Start: 2021-11-08 | End: 2021-11-30

## 2021-11-08 RX ORDER — FUROSEMIDE 20 MG/1
20 TABLET ORAL DAILY
Qty: 30 TABLET | Refills: 1 | Status: SHIPPED | OUTPATIENT
Start: 2021-11-08 | End: 2021-11-30

## 2021-11-08 NOTE — PROGRESS NOTES
"Subjective   Kitty Berry is a 66 y.o. female.     Subjective / HPI  Patient complaining of short of breath while lie down flat or exert her self. She said two or three pillows do help her. No chest pain. No nausea or vomiting. No pedal edema noted.    Review of Systems    Objective     /90 (BP Location: Left arm)   Pulse 98   Temp 96.8 °F (36 °C) (Infrared)   Resp 16   Ht 157.5 cm (62\")   Wt 92.5 kg (204 lb)   SpO2 96%   BMI 37.31 kg/m²      Physical Exam  Chest ... minimal crackles in both lung bases.   CVS .... s1s2 no murmur  Abdomen ... soft non tender gut sound audible.  Procedures       Assessment/Plan   Diagnoses and all orders for this visit:    1. Short of breath on exertion (Primary)  -     ECG 12 Lead  -     BNP; Future  -     XR Chest 2 View; Future  -     Cancel: Stress test with myocardial perfusion; Future  -     Stress test with myocardial perfusion; Future    2. Encounter for screening for cardiovascular disorders   -     BNP; Future    Other orders  -     furosemide (Lasix) 20 MG tablet; Take 1 tablet by mouth Daily.  Dispense: 30 tablet; Refill: 1  -     isosorbide mononitrate (IMDUR) 30 MG 24 hr tablet; Take 1 tablet by mouth Daily.  Dispense: 30 tablet; Refill: 2       EKG reviewed, showed NSR, no ST changes were noted.          "

## 2021-11-09 LAB
25(OH)D3 SERPL-MCNC: 54.5 NG/ML (ref 30–100)
ALBUMIN SERPL-MCNC: 4.5 G/DL (ref 3.5–5.2)
ALBUMIN/GLOB SERPL: 1.7 G/DL
ALP SERPL-CCNC: 101 U/L (ref 39–117)
ALT SERPL W P-5'-P-CCNC: 56 U/L (ref 1–33)
ANION GAP SERPL CALCULATED.3IONS-SCNC: 11.5 MMOL/L (ref 5–15)
AST SERPL-CCNC: 39 U/L (ref 1–32)
BACTERIA UR QL AUTO: ABNORMAL /HPF
BASOPHILS # BLD AUTO: 0.03 10*3/MM3 (ref 0–0.2)
BASOPHILS NFR BLD AUTO: 0.5 % (ref 0–1.5)
BILIRUB SERPL-MCNC: 0.2 MG/DL (ref 0–1.2)
BILIRUB UR QL STRIP: NEGATIVE
BUN SERPL-MCNC: 20 MG/DL (ref 8–23)
BUN/CREAT SERPL: 24.7 (ref 7–25)
CALCIUM SPEC-SCNC: 9.5 MG/DL (ref 8.6–10.5)
CHLORIDE SERPL-SCNC: 101 MMOL/L (ref 98–107)
CHOLEST SERPL-MCNC: 209 MG/DL (ref 0–200)
CLARITY UR: CLEAR
CO2 SERPL-SCNC: 26.5 MMOL/L (ref 22–29)
COLOR UR: YELLOW
CREAT SERPL-MCNC: 0.81 MG/DL (ref 0.57–1)
DEPRECATED RDW RBC AUTO: 43.9 FL (ref 37–54)
EOSINOPHIL # BLD AUTO: 0.1 10*3/MM3 (ref 0–0.4)
EOSINOPHIL NFR BLD AUTO: 1.7 % (ref 0.3–6.2)
ERYTHROCYTE [DISTWIDTH] IN BLOOD BY AUTOMATED COUNT: 13.9 % (ref 12.3–15.4)
GFR SERPL CREATININE-BSD FRML MDRD: 70 ML/MIN/1.73
GLOBULIN UR ELPH-MCNC: 2.6 GM/DL
GLUCOSE SERPL-MCNC: 92 MG/DL (ref 65–99)
GLUCOSE UR STRIP-MCNC: NEGATIVE MG/DL
HCT VFR BLD AUTO: 40.6 % (ref 34–46.6)
HDLC SERPL-MCNC: 55 MG/DL (ref 40–60)
HGB BLD-MCNC: 13.4 G/DL (ref 12–15.9)
HGB UR QL STRIP.AUTO: NEGATIVE
HYALINE CASTS UR QL AUTO: ABNORMAL /LPF
IMM GRANULOCYTES # BLD AUTO: 0.02 10*3/MM3 (ref 0–0.05)
IMM GRANULOCYTES NFR BLD AUTO: 0.3 % (ref 0–0.5)
KETONES UR QL STRIP: ABNORMAL
LDLC SERPL CALC-MCNC: 117 MG/DL (ref 0–100)
LDLC/HDLC SERPL: 2.03 {RATIO}
LEUKOCYTE ESTERASE UR QL STRIP.AUTO: ABNORMAL
LYMPHOCYTES # BLD AUTO: 1.31 10*3/MM3 (ref 0.7–3.1)
LYMPHOCYTES NFR BLD AUTO: 21.7 % (ref 19.6–45.3)
MCH RBC QN AUTO: 28.8 PG (ref 26.6–33)
MCHC RBC AUTO-ENTMCNC: 33 G/DL (ref 31.5–35.7)
MCV RBC AUTO: 87.1 FL (ref 79–97)
MONOCYTES # BLD AUTO: 0.66 10*3/MM3 (ref 0.1–0.9)
MONOCYTES NFR BLD AUTO: 10.9 % (ref 5–12)
NEUTROPHILS NFR BLD AUTO: 3.91 10*3/MM3 (ref 1.7–7)
NEUTROPHILS NFR BLD AUTO: 64.9 % (ref 42.7–76)
NITRITE UR QL STRIP: NEGATIVE
NRBC BLD AUTO-RTO: 0 /100 WBC (ref 0–0.2)
NT-PROBNP SERPL-MCNC: 126 PG/ML (ref 0–900)
PH UR STRIP.AUTO: 5.5 [PH] (ref 5–8)
PLATELET # BLD AUTO: 180 10*3/MM3 (ref 140–450)
PMV BLD AUTO: 11.3 FL (ref 6–12)
POTASSIUM SERPL-SCNC: 4.5 MMOL/L (ref 3.5–5.2)
PROT SERPL-MCNC: 7.1 G/DL (ref 6–8.5)
PROT UR QL STRIP: NEGATIVE
RBC # BLD AUTO: 4.66 10*6/MM3 (ref 3.77–5.28)
RBC # UR: ABNORMAL /HPF
REF LAB TEST METHOD: ABNORMAL
SODIUM SERPL-SCNC: 139 MMOL/L (ref 136–145)
SP GR UR STRIP: 1.03 (ref 1–1.03)
SQUAMOUS #/AREA URNS HPF: ABNORMAL /HPF
T4 FREE SERPL-MCNC: 1.3 NG/DL (ref 0.93–1.7)
TRIGL SERPL-MCNC: 213 MG/DL (ref 0–150)
TSH SERPL DL<=0.05 MIU/L-ACNC: 1.41 UIU/ML (ref 0.27–4.2)
UROBILINOGEN UR QL STRIP: ABNORMAL
VLDLC SERPL-MCNC: 37 MG/DL (ref 5–40)
WBC # BLD AUTO: 6.03 10*3/MM3 (ref 3.4–10.8)
WBC UR QL AUTO: ABNORMAL /HPF

## 2021-11-10 ENCOUNTER — OFFICE VISIT (OUTPATIENT)
Dept: FAMILY MEDICINE CLINIC | Facility: CLINIC | Age: 66
End: 2021-11-10

## 2021-11-10 ENCOUNTER — TELEPHONE (OUTPATIENT)
Dept: FAMILY MEDICINE CLINIC | Facility: CLINIC | Age: 66
End: 2021-11-10

## 2021-11-10 VITALS
WEIGHT: 203.6 LBS | HEART RATE: 80 BPM | BODY MASS INDEX: 37.47 KG/M2 | SYSTOLIC BLOOD PRESSURE: 113 MMHG | OXYGEN SATURATION: 95 % | DIASTOLIC BLOOD PRESSURE: 78 MMHG | RESPIRATION RATE: 12 BRPM | HEIGHT: 62 IN

## 2021-11-10 DIAGNOSIS — I10 PRIMARY HYPERTENSION: ICD-10-CM

## 2021-11-10 DIAGNOSIS — J96.01 ACUTE HYPOXEMIC RESPIRATORY FAILURE DUE TO COVID-19 (HCC): ICD-10-CM

## 2021-11-10 DIAGNOSIS — U07.1 ACUTE HYPOXEMIC RESPIRATORY FAILURE DUE TO COVID-19 (HCC): ICD-10-CM

## 2021-11-10 DIAGNOSIS — J45.41 MODERATE PERSISTENT REACTIVE AIRWAY DISEASE WITH ACUTE EXACERBATION: Primary | ICD-10-CM

## 2021-11-10 PROBLEM — J45.901 REACTIVE AIRWAY DISEASE WITH ACUTE EXACERBATION: Status: ACTIVE | Noted: 2021-11-10

## 2021-11-10 LAB — BACTERIA SPEC AEROBE CULT: NORMAL

## 2021-11-10 PROCEDURE — 94640 AIRWAY INHALATION TREATMENT: CPT | Performed by: INTERNAL MEDICINE

## 2021-11-10 PROCEDURE — 99214 OFFICE O/P EST MOD 30 MIN: CPT | Performed by: INTERNAL MEDICINE

## 2021-11-10 RX ORDER — ALBUTEROL SULFATE 2.5 MG/3ML
2.5 SOLUTION RESPIRATORY (INHALATION) EVERY 6 HOURS PRN
Status: DISCONTINUED | OUTPATIENT
Start: 2021-11-10 | End: 2022-12-02

## 2021-11-10 RX ORDER — ALBUTEROL SULFATE 90 UG/1
2 AEROSOL, METERED RESPIRATORY (INHALATION) EVERY 4 HOURS PRN
Qty: 8 G | Refills: 1 | Status: SHIPPED | OUTPATIENT
Start: 2021-11-10 | End: 2021-11-10

## 2021-11-10 RX ORDER — PREDNISONE 20 MG/1
20 TABLET ORAL 2 TIMES DAILY
Qty: 10 TABLET | Refills: 0 | Status: SHIPPED | OUTPATIENT
Start: 2021-11-10 | End: 2021-11-10

## 2021-11-10 RX ORDER — ALBUTEROL SULFATE 90 UG/1
2 AEROSOL, METERED RESPIRATORY (INHALATION) EVERY 4 HOURS PRN
Qty: 8 G | Refills: 1 | Status: SHIPPED | OUTPATIENT
Start: 2021-11-10 | End: 2023-01-18

## 2021-11-10 RX ORDER — PREDNISONE 20 MG/1
20 TABLET ORAL 2 TIMES DAILY
Qty: 14 TABLET | Refills: 0 | Status: SHIPPED | OUTPATIENT
Start: 2021-11-10 | End: 2021-11-16

## 2021-11-10 RX ADMIN — ALBUTEROL SULFATE 2.5 MG: 2.5 SOLUTION RESPIRATORY (INHALATION) at 12:44

## 2021-11-10 NOTE — TELEPHONE ENCOUNTER
----- Message from Kitty Berry sent at 11/10/2021  8:02 AM EST -----  Regarding: Coughing  Yes, I will be there. Thank you!

## 2021-11-10 NOTE — PROGRESS NOTES
"Rooming Tab(CC,VS,Pt Hx,Fall Screen)  Chief Complaint   Patient presents with   • Cough   • Nasal Congestion       Subjective   Pt was seen on Monday with SOA and cough- had negative EKG, BNP to be scheduled for stress test tomorrow. Did not sleep at all last night as cough was so bad- worse if lays flat. No ankle swelling   had stress test, CT and PT's in May after COVID  I have reviewed and updated her medications, medical history and problem list during today's office visit.     Patient Care Team:  Nadia Mcclure MD as PCP - General    Problem List Tab  Medications Tab  Synopsis Tab  Chart Review Tab  Care Everywhere Tab  Immunizations Tab  Patient History Tab    Social History     Tobacco Use   • Smoking status: Former Smoker     Types: Cigarettes     Quit date:      Years since quittin.8   • Smokeless tobacco: Never Used   Substance Use Topics   • Alcohol use: Yes       Review of Systems    Objective     Rooming Tab(CC,VS,Pt Hx,Fall Screen)  /78   Pulse 80   Resp 12   Ht 157.5 cm (62\")   Wt 92.4 kg (203 lb 9.6 oz)   SpO2 95%   BMI 37.24 kg/m²     Body mass index is 37.24 kg/m².    Physical Exam  Vitals and nursing note reviewed.   Constitutional:       Appearance: Normal appearance. She is well-developed. She is obese.   HENT:      Head: Normocephalic and atraumatic.      Right Ear: Tympanic membrane normal.      Left Ear: Tympanic membrane normal.      Nose: No rhinorrhea.      Mouth/Throat:      Pharynx: No posterior oropharyngeal erythema.   Eyes:      Pupils: Pupils are equal, round, and reactive to light.   Cardiovascular:      Rate and Rhythm: Regular rhythm. Tachycardia present.      Pulses: Normal pulses.      Heart sounds: Normal heart sounds. No murmur heard.      Pulmonary:      Effort: Pulmonary effort is normal.      Breath sounds: Wheezing present.   Abdominal:      General: Bowel sounds are normal. There is no distension.      Palpations: Abdomen is soft. "   Musculoskeletal:         General: No tenderness.      Cervical back: Normal range of motion and neck supple.   Skin:     Capillary Refill: Capillary refill takes less than 2 seconds.   Neurological:      Mental Status: She is alert and oriented to person, place, and time.   Psychiatric:         Mood and Affect: Mood normal.         Behavior: Behavior normal.          Statin Choice Calculator  Data Reviewed:    XR Chest 2 View    Result Date: 11/8/2021  Impression: No acute chest findings.  Electronically Signed By-Omaira Matos MD On:11/8/2021 3:50 PM This report was finalized on [id number] by  Omaira Matos MD.      The data below has been reviewed by Nadia Mcclure MD on 11/10/2021.      Lab Results   Component Value Date    BUN 20 11/08/2021    CREATININE 0.81 11/08/2021    EGFRIFNONA 70 11/08/2021     11/08/2021    K 4.5 11/08/2021     11/08/2021    CALCIUM 9.5 11/08/2021    ALBUMIN 4.50 11/08/2021    BILITOT 0.2 11/08/2021    ALKPHOS 101 11/08/2021    AST 39 (H) 11/08/2021    ALT 56 (H) 11/08/2021    TRIG 213 (H) 11/08/2021    HDL 55 11/08/2021    VLDL 37 11/08/2021     (H) 11/08/2021    LDLHDL 2.03 11/08/2021    WBC 6.03 11/08/2021    RBC 4.66 11/08/2021    HCT 40.6 11/08/2021    MCV 87.1 11/08/2021    MCH 28.8 11/08/2021    TSH 1.410 11/08/2021    FREET4 1.30 11/08/2021    KUAI28ML 54.5 11/08/2021      Assessment/Plan   Order Review Tab  Health Maintenance Tab  Patient Plan/Order Tab  Diagnoses and all orders for this visit:    1. Moderate persistent reactive airway disease with acute exacerbation (Primary)  Comments:  much improved ater MN- will Dc home on inhaler and prednisone. hold on stress test now  Assessment & Plan:  Albuterol inhaler and prednisone    Orders:  -     albuterol (PROVENTIL) nebulizer solution 0.083% 2.5 mg/3mL    2. Acute hypoxemic respiratory failure due to COVID-19 (HCC)  Comments:  COVID had been fully resolved- doubt this is related  Orders:  -      albuterol (PROVENTIL) nebulizer solution 0.083% 2.5 mg/3mL    3. Primary hypertension  Comments:  stable    Other orders  -     Discontinue: albuterol sulfate  (90 Base) MCG/ACT inhaler; Inhale 2 puffs Every 4 (Four) Hours As Needed for Wheezing.  Dispense: 8 g; Refill: 1  -     Discontinue: predniSONE (DELTASONE) 20 MG tablet; Take 1 tablet by mouth 2 (Two) Times a Day for 5 days.  Dispense: 10 tablet; Refill: 0  -     albuterol sulfate  (90 Base) MCG/ACT inhaler; Inhale 2 puffs Every 4 (Four) Hours As Needed for Wheezing.  Dispense: 8 g; Refill: 1  -     predniSONE (DELTASONE) 20 MG tablet; Take 1 tablet by mouth 2 (Two) Times a Day for 7 days.  Dispense: 14 tablet; Refill: 0      Wrapup Tab  Return if symptoms worsen or fail to improve.       They were informed of the diagnosis and treatment plan and directed to f/u for any further problems or concerns.

## 2021-11-11 ENCOUNTER — APPOINTMENT (OUTPATIENT)
Dept: NUCLEAR MEDICINE | Facility: HOSPITAL | Age: 66
End: 2021-11-11

## 2021-11-16 ENCOUNTER — PATIENT MESSAGE (OUTPATIENT)
Dept: FAMILY MEDICINE CLINIC | Facility: CLINIC | Age: 66
End: 2021-11-16

## 2021-11-16 RX ORDER — PREDNISONE 20 MG/1
TABLET ORAL
Qty: 20 TABLET | Refills: 0 | Status: SHIPPED | OUTPATIENT
Start: 2021-11-16 | End: 2021-11-30

## 2021-11-17 NOTE — TELEPHONE ENCOUNTER
From: Kitty Berry  To: Nadia Mcclure MD  Sent: 11/16/2021 1:23 PM EST  Subject: Still coughing and wheezing some at night    Hi Dr Mcclure,  I am still coughing quite a bit and still wheezing some as well, mostly at night. . My last dose of Prednisone is tomorrow. Should I refill it? The cough is nonproductive (should I be coughing up anything) and my nose is still running quite a bit. I’m ready to be done with this coughing. Can I take cough medicine or will it help due to lung infection.   Thank you,  Kitty.

## 2021-11-18 ENCOUNTER — TELEPHONE (OUTPATIENT)
Dept: FAMILY MEDICINE CLINIC | Facility: CLINIC | Age: 66
End: 2021-11-18

## 2021-11-18 RX ORDER — PREDNISONE 20 MG/1
TABLET ORAL
Qty: 14 TABLET | OUTPATIENT
Start: 2021-11-18

## 2021-11-18 NOTE — TELEPHONE ENCOUNTER
Caller: Kitty Berry    Relationship to patient: Self    Best call back number: [phone]    Patient is needing: PATIENT CALLING TO CHECK ON WHY HER REFILL REQUEST FOR PREDNISONE WAS DECLINED, SHE SAID SHE HAS BEEN ON ONE WEEK OF IT, AND THOUGHT DR. VARELA WAS GOING TO EXTEND IT TO ANOTHER WEEK

## 2021-11-18 NOTE — TELEPHONE ENCOUNTER
GAVE MESSAGE TO PATIENT AND TOLD HER TO CONTACT PHARMACY TO SEE WHEN IT WILL BE READY IF NOT ALREADY.

## 2021-11-30 ENCOUNTER — OFFICE VISIT (OUTPATIENT)
Dept: FAMILY MEDICINE CLINIC | Facility: CLINIC | Age: 66
End: 2021-11-30

## 2021-11-30 VITALS
OXYGEN SATURATION: 97 % | HEART RATE: 93 BPM | TEMPERATURE: 97.3 F | BODY MASS INDEX: 38.09 KG/M2 | RESPIRATION RATE: 16 BRPM | HEIGHT: 62 IN | DIASTOLIC BLOOD PRESSURE: 92 MMHG | SYSTOLIC BLOOD PRESSURE: 118 MMHG | WEIGHT: 207 LBS

## 2021-11-30 DIAGNOSIS — J45.41 MODERATE PERSISTENT REACTIVE AIRWAY DISEASE WITH ACUTE EXACERBATION: ICD-10-CM

## 2021-11-30 DIAGNOSIS — Z00.00 ENCOUNTER FOR GENERAL ADULT MEDICAL EXAMINATION WITHOUT ABNORMAL FINDINGS: Primary | ICD-10-CM

## 2021-11-30 DIAGNOSIS — Z01.812 PRE-PROCEDURE LAB EXAM: ICD-10-CM

## 2021-11-30 DIAGNOSIS — K21.9 GASTROESOPHAGEAL REFLUX DISEASE WITHOUT ESOPHAGITIS: ICD-10-CM

## 2021-11-30 DIAGNOSIS — Z78.0 POSTMENOPAUSE: ICD-10-CM

## 2021-11-30 DIAGNOSIS — E03.9 ACQUIRED HYPOTHYROIDISM: ICD-10-CM

## 2021-11-30 PROCEDURE — 99213 OFFICE O/P EST LOW 20 MIN: CPT | Performed by: INTERNAL MEDICINE

## 2021-11-30 PROCEDURE — 1170F FXNL STATUS ASSESSED: CPT | Performed by: INTERNAL MEDICINE

## 2021-11-30 PROCEDURE — G0439 PPPS, SUBSEQ VISIT: HCPCS | Performed by: INTERNAL MEDICINE

## 2021-11-30 PROCEDURE — 1160F RVW MEDS BY RX/DR IN RCRD: CPT | Performed by: INTERNAL MEDICINE

## 2021-11-30 RX ORDER — AMLODIPINE BESYLATE 5 MG/1
5 TABLET ORAL DAILY
Qty: 90 TABLET | Refills: 12 | Status: SHIPPED | OUTPATIENT
Start: 2021-11-30 | End: 2021-12-06

## 2021-11-30 RX ORDER — VIT C/B6/B5/MAGNESIUM/HERB 173 50-5-6-5MG
1 CAPSULE ORAL DAILY
COMMUNITY
End: 2022-12-02

## 2021-12-01 ENCOUNTER — APPOINTMENT (OUTPATIENT)
Dept: NUCLEAR MEDICINE | Facility: HOSPITAL | Age: 66
End: 2021-12-01

## 2021-12-04 PROBLEM — Z01.812 PRE-PROCEDURE LAB EXAM: Status: ACTIVE | Noted: 2021-12-04

## 2021-12-06 RX ORDER — AMLODIPINE BESYLATE 5 MG/1
TABLET ORAL
Qty: 30 TABLET | Refills: 2 | Status: SHIPPED | OUTPATIENT
Start: 2021-12-06 | End: 2022-12-02 | Stop reason: SDUPTHER

## 2021-12-09 ENCOUNTER — HOSPITAL ENCOUNTER (OUTPATIENT)
Dept: CT IMAGING | Facility: HOSPITAL | Age: 66
Discharge: HOME | End: 2021-12-09
Admitting: INTERNAL MEDICINE

## 2021-12-09 DIAGNOSIS — J45.41 MODERATE PERSISTENT REACTIVE AIRWAY DISEASE WITH ACUTE EXACERBATION: ICD-10-CM

## 2021-12-09 LAB — CREAT BLDA-MCNC: 0.8 MG/DL (ref 0.6–1.3)

## 2021-12-09 PROCEDURE — 71260 CT THORAX DX C+: CPT

## 2021-12-09 PROCEDURE — 82565 ASSAY OF CREATININE: CPT

## 2021-12-09 PROCEDURE — 0 IOPAMIDOL PER 1 ML: Performed by: INTERNAL MEDICINE

## 2021-12-09 RX ORDER — LEVOFLOXACIN 500 MG/1
500 TABLET, FILM COATED ORAL DAILY
Qty: 10 TABLET | Refills: 0 | Status: SHIPPED | OUTPATIENT
Start: 2021-12-09 | End: 2022-04-15

## 2021-12-09 RX ADMIN — IOPAMIDOL 100 ML: 755 INJECTION, SOLUTION INTRAVENOUS at 13:32

## 2021-12-09 NOTE — PROGRESS NOTES
Ct shows COVID is better but still with ground glass but still with an acute small pneumonia- sending in new Rx for antibiotic.

## 2021-12-21 ENCOUNTER — LAB (OUTPATIENT)
Dept: LAB | Facility: HOSPITAL | Age: 66
End: 2021-12-21

## 2021-12-21 DIAGNOSIS — Z01.812 PRE-PROCEDURE LAB EXAM: ICD-10-CM

## 2021-12-21 LAB — SARS-COV-2 ORF1AB RESP QL NAA+PROBE: NOT DETECTED

## 2021-12-21 PROCEDURE — C9803 HOPD COVID-19 SPEC COLLECT: HCPCS

## 2021-12-21 PROCEDURE — U0004 COV-19 TEST NON-CDC HGH THRU: HCPCS

## 2021-12-23 ENCOUNTER — HOSPITAL ENCOUNTER (OUTPATIENT)
Dept: RESPIRATORY THERAPY | Facility: HOSPITAL | Age: 66
Discharge: HOME | End: 2021-12-23
Admitting: INTERNAL MEDICINE

## 2021-12-23 DIAGNOSIS — J45.41 MODERATE PERSISTENT REACTIVE AIRWAY DISEASE WITH ACUTE EXACERBATION: ICD-10-CM

## 2021-12-23 PROCEDURE — 94729 DIFFUSING CAPACITY: CPT

## 2021-12-23 PROCEDURE — 94060 EVALUATION OF WHEEZING: CPT

## 2021-12-23 PROCEDURE — 94727 GAS DIL/WSHOT DETER LNG VOL: CPT

## 2021-12-23 RX ORDER — ALBUTEROL SULFATE 90 UG/1
2 AEROSOL, METERED RESPIRATORY (INHALATION) ONCE
Status: COMPLETED | OUTPATIENT
Start: 2021-12-23 | End: 2021-12-23

## 2021-12-23 RX ADMIN — ALBUTEROL SULFATE 2 PUFF: 108 INHALANT RESPIRATORY (INHALATION) at 11:16

## 2021-12-28 ENCOUNTER — TELEPHONE (OUTPATIENT)
Dept: FAMILY MEDICINE CLINIC | Facility: CLINIC | Age: 66
End: 2021-12-28

## 2021-12-28 NOTE — TELEPHONE ENCOUNTER
Caller: Kitty Berry    Relationship: Self    Best call back number:[phone]    Caller requesting test results:     What test was performed: PULMONARY FUNCTION    When was the test performed: 12/23/21    Where was the test performed: [facility]    Additional notes:

## 2021-12-28 NOTE — TELEPHONE ENCOUNTER
Can you please let her know that results are not back yet, can take up to 7-10 days during regular days, so may be longer due to the holiday.

## 2021-12-30 ENCOUNTER — TELEPHONE (OUTPATIENT)
Dept: FAMILY MEDICINE CLINIC | Facility: CLINIC | Age: 66
End: 2021-12-30

## 2021-12-30 NOTE — TELEPHONE ENCOUNTER
I spoke with patient regarding the authorization of the Myocardial Perfusion imaging test. I gave the patient the authorization number and the beginning date and expiration date of the authorization.

## 2021-12-30 NOTE — TELEPHONE ENCOUNTER
Sent patient a message through My chart letting her know that her Stress test has been authorized through her Insurance company.

## 2021-12-30 NOTE — TELEPHONE ENCOUNTER
Caller: Kitty Berry A    Relationship to patient: Self    Best call back number: [phone]    Patient is needing: PATIENT CALLED AND SAID SHE HAD NOT RECEIVED A LETTER FROM HER INSURANCE PROVIDER ASKING FOR A PRIOR AUTHORIZATION ON HER STRESS TEST THAT IS SCHEDULED FOR NEXT WEEK    SHE WAS WANTING TO MAKE SURE IT WOULD BE APPROVED THROUGH INSURANCE, HUB ADVISED THE PATIENT CONTACT HER INSURANCE PROVIDER

## 2021-12-31 ENCOUNTER — PATIENT MESSAGE (OUTPATIENT)
Dept: FAMILY MEDICINE CLINIC | Facility: CLINIC | Age: 66
End: 2021-12-31

## 2022-01-03 ENCOUNTER — HOSPITAL ENCOUNTER (OUTPATIENT)
Dept: NUCLEAR MEDICINE | Facility: HOSPITAL | Age: 67
Discharge: HOME | End: 2022-01-03

## 2022-01-03 DIAGNOSIS — R06.02 SHORT OF BREATH ON EXERTION: ICD-10-CM

## 2022-01-03 LAB
BH CV REST NUCLEAR ISOTOPE DOSE: 7.9 MCI
BH CV STRESS BP STAGE 2: NORMAL
BH CV STRESS BP STAGE 3: NORMAL
BH CV STRESS BP STAGE 4: NORMAL
BH CV STRESS COMMENTS STAGE 1: NORMAL
BH CV STRESS COMMENTS STAGE 2: NORMAL
BH CV STRESS DOSE REGADENOSON STAGE 1: 0.4
BH CV STRESS DURATION MIN STAGE 1: 0
BH CV STRESS DURATION MIN STAGE 2: 4
BH CV STRESS DURATION SEC STAGE 1: 10
BH CV STRESS DURATION SEC STAGE 2: 0
BH CV STRESS HR STAGE 1: 71
BH CV STRESS HR STAGE 2: 84
BH CV STRESS HR STAGE 3: 83
BH CV STRESS HR STAGE 4: 80
BH CV STRESS NUCLEAR ISOTOPE DOSE: 21.9 MCI
BH CV STRESS PROTOCOL 1: NORMAL
BH CV STRESS RECOVERY BP: NORMAL MMHG
BH CV STRESS RECOVERY HR: 79 BPM
BH CV STRESS STAGE 1: 1
BH CV STRESS STAGE 2: 2
BH CV STRESS STAGE 3: 3
BH CV STRESS STAGE 4: 4
LV EF NUC BP: 65 %
MAXIMAL PREDICTED HEART RATE: 152 BPM
PERCENT MAX PREDICTED HR: 55.26 %
STRESS BASELINE BP: NORMAL MMHG
STRESS BASELINE HR: 63 BPM
STRESS PERCENT HR: 65 %
STRESS POST PEAK BP: NORMAL MMHG
STRESS POST PEAK HR: 84 BPM
STRESS TARGET HR: 129 BPM

## 2022-01-03 PROCEDURE — 93017 CV STRESS TEST TRACING ONLY: CPT

## 2022-01-03 PROCEDURE — 0 TECHNETIUM TETROFOSMIN KIT: Performed by: HOSPITALIST

## 2022-01-03 PROCEDURE — 78452 HT MUSCLE IMAGE SPECT MULT: CPT | Performed by: INTERNAL MEDICINE

## 2022-01-03 PROCEDURE — 93016 CV STRESS TEST SUPVJ ONLY: CPT | Performed by: INTERNAL MEDICINE

## 2022-01-03 PROCEDURE — 25010000002 REGADENOSON 0.4 MG/5ML SOLUTION: Performed by: HOSPITALIST

## 2022-01-03 PROCEDURE — 78452 HT MUSCLE IMAGE SPECT MULT: CPT

## 2022-01-03 PROCEDURE — 93018 CV STRESS TEST I&R ONLY: CPT | Performed by: INTERNAL MEDICINE

## 2022-01-03 PROCEDURE — A9502 TC99M TETROFOSMIN: HCPCS | Performed by: HOSPITALIST

## 2022-01-03 RX ADMIN — TETROFOSMIN 1 DOSE: 1.38 INJECTION, POWDER, LYOPHILIZED, FOR SOLUTION INTRAVENOUS at 09:05

## 2022-01-03 RX ADMIN — REGADENOSON 0.4 MG: 0.08 INJECTION, SOLUTION INTRAVENOUS at 10:19

## 2022-01-03 RX ADMIN — TETROFOSMIN 1 DOSE: 1.38 INJECTION, POWDER, LYOPHILIZED, FOR SOLUTION INTRAVENOUS at 10:19

## 2022-01-14 ENCOUNTER — TELEPHONE (OUTPATIENT)
Dept: FAMILY MEDICINE CLINIC | Facility: CLINIC | Age: 67
End: 2022-01-14

## 2022-01-14 DIAGNOSIS — F41.9 ANXIETY: ICD-10-CM

## 2022-01-14 DIAGNOSIS — E03.9 ACQUIRED HYPOTHYROIDISM: ICD-10-CM

## 2022-01-14 RX ORDER — MELOXICAM 15 MG/1
15 TABLET ORAL DAILY
Qty: 30 TABLET | Refills: 2 | Status: SHIPPED | OUTPATIENT
Start: 2022-01-14 | End: 2022-06-20

## 2022-01-14 RX ORDER — LEVOTHYROXINE SODIUM 0.05 MG/1
50 TABLET ORAL DAILY
Qty: 90 TABLET | Refills: 2 | Status: SHIPPED | OUTPATIENT
Start: 2022-01-14 | End: 2022-11-16

## 2022-01-14 NOTE — TELEPHONE ENCOUNTER
Caller: Zakia Berry    Relationship: Self    Best call back number: [phone]    What does billing need from the patient: VISIT FROM 1/3/22 STRESS TEST WAS DENIED BY INSURANCE.     ZAKIA STATES THERE ARE TWO SEPARATE CT CODES FROM DENIAL LETTER AND PRE AUTHORIZATION . ADVISED PATIENT TO CALL INSURANCE TO SEE WHY IT WAS DENIED, PATIENT AGREED AND WILL REACH BACK OUT.

## 2022-01-14 NOTE — TELEPHONE ENCOUNTER
Caller: BerryKitty    Relationship: Self    Best call back number:[phone]     Requested Prescriptions:   Requested Prescriptions     Pending Prescriptions Disp Refills   • levothyroxine (SYNTHROID, LEVOTHROID) 50 MCG tablet 90 tablet 2     Sig: Take 1 tablet by mouth Daily.   • meloxicam (MOBIC) 15 MG tablet 30 tablet 2     Sig: Take 1 tablet by mouth Daily.   • ALPRAZolam (XANAX) 0.5 MG tablet 60 tablet 1     Sig: Take 1 tablet by mouth 2 (Two) Times a Day.        Pharmacy where request should be sent: [facility] [phone] [place], [place] [phone] FX     Additional details provided by patient: 3 MONTH SUPPLY, 1 WEEK OF MEDICATION REMAINING     Does the patient have less than a 3 day supply:  [] Yes  [x] No    Andie Banuelos   01/14/22 11:20 EST

## 2022-01-17 NOTE — TELEPHONE ENCOUNTER
Caller: Kitty Berry    Relationship: Self    Best call back number: [phone]    Which medication are you concerned about: ALPRAZolam (XANAX) 0.5 MG tablet    Who prescribed you this medication: DR VARELA    What are your concerns: OTHER TWO MEDICATIONS WERE CALLED IN BUT THIS XANAX WAS NOT. PATIENT WANTED TO MAKE SURE THERE IS NO ISSUE WITH TRANSFERRING THIS MEDICATION FROM [facility] TO [facility].    How

## 2022-01-18 RX ORDER — ALPRAZOLAM 0.5 MG/1
0.5 TABLET ORAL 2 TIMES DAILY
Qty: 60 TABLET | Refills: 1 | Status: SHIPPED | OUTPATIENT
Start: 2022-01-18 | End: 2022-07-15 | Stop reason: SDUPTHER

## 2022-04-07 ENCOUNTER — PATIENT MESSAGE (OUTPATIENT)
Dept: FAMILY MEDICINE CLINIC | Facility: CLINIC | Age: 67
End: 2022-04-07

## 2022-04-07 DIAGNOSIS — R30.0 DYSURIA: Primary | ICD-10-CM

## 2022-04-07 PROCEDURE — 81003 URINALYSIS AUTO W/O SCOPE: CPT | Performed by: INTERNAL MEDICINE

## 2022-04-08 ENCOUNTER — LAB (OUTPATIENT)
Dept: FAMILY MEDICINE CLINIC | Facility: CLINIC | Age: 67
End: 2022-04-08

## 2022-04-08 LAB
BILIRUB BLD-MCNC: NEGATIVE MG/DL
CLARITY, POC: CLEAR
COLOR UR: YELLOW
EXPIRATION DATE: NORMAL
GLUCOSE UR STRIP-MCNC: NEGATIVE MG/DL
KETONES UR QL: NEGATIVE
LEUKOCYTE EST, POC: NEGATIVE
Lab: NORMAL
NITRITE UR-MCNC: NEGATIVE MG/ML
PH UR: 5.5 [PH] (ref 5–8)
PROT UR STRIP-MCNC: NEGATIVE MG/DL
RBC # UR STRIP: NEGATIVE /UL
SP GR UR: 1.02 (ref 1–1.03)
UROBILINOGEN UR QL: NORMAL

## 2022-04-08 PROCEDURE — 87086 URINE CULTURE/COLONY COUNT: CPT | Performed by: INTERNAL MEDICINE

## 2022-04-08 NOTE — TELEPHONE ENCOUNTER
From: Kitty Berry  To: Nadia Mcclure MD  Sent: 4/7/2022 8:03 PM EDT  Subject: Possible UTI    Hi Dr. Mcclure,  This morning I woke up with very,very low abdominal pain. I haven’t had a UTI in years but I remember this is what it felt like. There is no pain on urination, just the low abdominal pain and low back ache. I was wondering if you could call me in something (to [facility] in ) if you agree with my assessment or if I need to come see you. I am not running a fever but feel cold. Hope you are doing well.  Thank you,   Kitty Berry.   [phone]

## 2022-04-09 LAB — BACTERIA SPEC AEROBE CULT: NORMAL

## 2022-04-12 ENCOUNTER — TELEPHONE (OUTPATIENT)
Dept: FAMILY MEDICINE CLINIC | Facility: CLINIC | Age: 67
End: 2022-04-12

## 2022-04-12 NOTE — TELEPHONE ENCOUNTER
Her belly needs to be examined and may need labs so would do in person   bland diet- no spicy, no dairy until appointment

## 2022-04-12 NOTE — TELEPHONE ENCOUNTER
Caller: Kitty Berry A    Relationship to patient: Self    Best call back number: [phone]    Patient is needing: PATIENT CALLED AND SAID THAT SHE THOUGHT SHE HAD A URINARY TRACT INFECTION, BUT SHE THINKS IT MAY BE SOMETHING ELSE NOW    SHE SAID SHE HAS SOME PAIN BETWEEN HER BELLY BUTTON AND PELVIC REGION, SHE SAID SHE'S BEEN CRAMPING BEFORE SHE GOES TO THE BATHROOM AND FEELING BLOATED    SHE SAID SHE'S ALSO NOTICED CHANGES IN HER STOOL     SHE SAID THE STOMACH PAIN GOES AWAY AFTER SHE GOES TO THE BATHROOM FOR A WHILE, THEN RETURNS A COUPLE HOURS LATER AND SHE HAS TO GO TO THE BATHROOM AGAIN     SHE SCHEDULED AN IN OFFICE APPOINTMENT FOR Friday, BUT WANTING TO SEE IF DR. VARELA HAS A RECOMMENDATION ON ANYTHING SHE CAN BE DOING IN THE MEAN TIME     SHE IS ALSO WANTING TO SEE F DR. VARELA WOULD BE OK WITH HER HAVING AS TELEPHONE VISIT RATHER THAN AN IN OFFICE VISIT     REQUESTED CALLBACK

## 2022-04-15 ENCOUNTER — LAB (OUTPATIENT)
Dept: FAMILY MEDICINE CLINIC | Facility: CLINIC | Age: 67
End: 2022-04-15

## 2022-04-15 ENCOUNTER — OFFICE VISIT (OUTPATIENT)
Dept: FAMILY MEDICINE CLINIC | Facility: CLINIC | Age: 67
End: 2022-04-15

## 2022-04-15 VITALS
DIASTOLIC BLOOD PRESSURE: 102 MMHG | HEIGHT: 62 IN | OXYGEN SATURATION: 99 % | HEART RATE: 78 BPM | TEMPERATURE: 97.5 F | WEIGHT: 199 LBS | SYSTOLIC BLOOD PRESSURE: 142 MMHG | BODY MASS INDEX: 36.62 KG/M2 | RESPIRATION RATE: 16 BRPM

## 2022-04-15 DIAGNOSIS — R35.0 URINARY FREQUENCY: ICD-10-CM

## 2022-04-15 DIAGNOSIS — K52.9 GASTROENTERITIS: Primary | ICD-10-CM

## 2022-04-15 LAB
BILIRUB BLD-MCNC: NEGATIVE MG/DL
CLARITY, POC: CLEAR
COLOR UR: YELLOW
EXPIRATION DATE: ABNORMAL
GLUCOSE UR STRIP-MCNC: NEGATIVE MG/DL
KETONES UR QL: ABNORMAL
LEUKOCYTE EST, POC: NEGATIVE
Lab: ABNORMAL
NITRITE UR-MCNC: NEGATIVE MG/ML
PH UR: 5.5 [PH] (ref 5–8)
PROT UR STRIP-MCNC: NEGATIVE MG/DL
RBC # UR STRIP: ABNORMAL /UL
SP GR UR: 1.03 (ref 1–1.03)
UROBILINOGEN UR QL: NORMAL

## 2022-04-15 PROCEDURE — 81003 URINALYSIS AUTO W/O SCOPE: CPT | Performed by: INTERNAL MEDICINE

## 2022-04-15 PROCEDURE — 99213 OFFICE O/P EST LOW 20 MIN: CPT | Performed by: INTERNAL MEDICINE

## 2022-04-15 PROCEDURE — 87086 URINE CULTURE/COLONY COUNT: CPT | Performed by: INTERNAL MEDICINE

## 2022-04-16 LAB — BACTERIA SPEC AEROBE CULT: NORMAL

## 2022-06-17 ENCOUNTER — PATIENT MESSAGE (OUTPATIENT)
Dept: FAMILY MEDICINE CLINIC | Facility: CLINIC | Age: 67
End: 2022-06-17

## 2022-06-17 RX ORDER — DICLOFENAC SODIUM 75 MG/1
TABLET, DELAYED RELEASE ORAL
Qty: 60 TABLET | Refills: 1 | Status: SHIPPED | OUTPATIENT
Start: 2022-06-17 | End: 2022-06-20 | Stop reason: SDUPTHER

## 2022-06-20 RX ORDER — DICLOFENAC SODIUM 75 MG/1
75 TABLET, DELAYED RELEASE ORAL 2 TIMES DAILY
Qty: 60 TABLET | Refills: 3 | Status: SHIPPED | OUTPATIENT
Start: 2022-06-20 | End: 2023-03-03

## 2022-06-20 NOTE — TELEPHONE ENCOUNTER
From: Kitty Berry  To: Nadia Mcclure MD  Sent: 6/17/2022 2:34 PM EDT  Subject: Voltaren    Hi Dr Mcclure,  I was wondering if you would call in a prescription to [facility] for me for Voltaren. I went to see Dr Rodrigues for my back in early May…I just wanted to see if there was anything that I could do to help the pain I have in my back more than just occasionally. He took X-rays, said it was mostly arthritic, some small disc issue and changed me over to Voltaren. I stopped taking the Meloxicam of course. The Voltaren seems to help both my back and my knee. I am out and have been trying to get a refill but his office is not responding. The pharmacy has sent 3 different requests and I have left 2 messages at their office but no response to either of us. Would you mind calling it in for me. He prescribed 75mg twice a day. It was filled on May 2nd with NR. I am not supposed to go back to him for a follow-up. I have gone back to the Meloxicam in the meantime because my knee started bothering me again.   Thank you!  [birth year]

## 2022-06-24 ENCOUNTER — LAB (OUTPATIENT)
Dept: FAMILY MEDICINE CLINIC | Facility: CLINIC | Age: 67
End: 2022-06-24

## 2022-06-24 ENCOUNTER — OFFICE VISIT (OUTPATIENT)
Dept: FAMILY MEDICINE CLINIC | Facility: CLINIC | Age: 67
End: 2022-06-24

## 2022-06-24 VITALS
HEART RATE: 86 BPM | WEIGHT: 203 LBS | TEMPERATURE: 96.8 F | OXYGEN SATURATION: 96 % | SYSTOLIC BLOOD PRESSURE: 124 MMHG | HEIGHT: 62 IN | BODY MASS INDEX: 37.36 KG/M2 | DIASTOLIC BLOOD PRESSURE: 80 MMHG | RESPIRATION RATE: 18 BRPM

## 2022-06-24 DIAGNOSIS — J45.41 MODERATE PERSISTENT REACTIVE AIRWAY DISEASE WITH ACUTE EXACERBATION: Primary | ICD-10-CM

## 2022-06-24 DIAGNOSIS — R91.8 LUNG MASS: ICD-10-CM

## 2022-06-24 PROCEDURE — 99213 OFFICE O/P EST LOW 20 MIN: CPT | Performed by: INTERNAL MEDICINE

## 2022-06-24 PROCEDURE — 86769 SARS-COV-2 COVID-19 ANTIBODY: CPT | Performed by: INTERNAL MEDICINE

## 2022-06-24 PROCEDURE — 36415 COLL VENOUS BLD VENIPUNCTURE: CPT | Performed by: INTERNAL MEDICINE

## 2022-06-26 LAB
SARS-COV-2 AB SERPL IA-ACNC: NORMAL U/ML
SARS-COV-2 AB SERPL IA-ACNC: NORMAL U/ML
SARS-COV-2 AB SERPL-IMP: POSITIVE

## 2022-07-13 ENCOUNTER — APPOINTMENT (OUTPATIENT)
Dept: CT IMAGING | Facility: HOSPITAL | Age: 67
End: 2022-07-13

## 2022-07-15 ENCOUNTER — HOSPITAL ENCOUNTER (OUTPATIENT)
Dept: CT IMAGING | Facility: HOSPITAL | Age: 67
Discharge: HOME | End: 2022-07-15
Admitting: INTERNAL MEDICINE

## 2022-07-15 ENCOUNTER — TELEPHONE (OUTPATIENT)
Dept: FAMILY MEDICINE CLINIC | Facility: CLINIC | Age: 67
End: 2022-07-15

## 2022-07-15 DIAGNOSIS — J45.41 MODERATE PERSISTENT REACTIVE AIRWAY DISEASE WITH ACUTE EXACERBATION: ICD-10-CM

## 2022-07-15 DIAGNOSIS — F41.9 ANXIETY: ICD-10-CM

## 2022-07-15 LAB
CREAT BLDA-MCNC: 0.8 MG/DL (ref 0.6–1.3)
EGFRCR SERPLBLD CKD-EPI 2021: 80.4 ML/MIN/1.73

## 2022-07-15 PROCEDURE — 0 IOPAMIDOL PER 1 ML: Performed by: INTERNAL MEDICINE

## 2022-07-15 PROCEDURE — 82565 ASSAY OF CREATININE: CPT

## 2022-07-15 PROCEDURE — 71260 CT THORAX DX C+: CPT

## 2022-07-15 RX ADMIN — IOPAMIDOL 100 ML: 755 INJECTION, SOLUTION INTRAVENOUS at 09:53

## 2022-07-15 NOTE — TELEPHONE ENCOUNTER
Caller: Zakia Berry    Relationship: Self    Best call back number: [phone]     What orders are you requesting (i.e. lab or imaging): MAMMOGRAM    In what timeframe would the patient need to come in: AFTER 8/12/2022    Where will you receive your lab/imaging services: [facility]   Additional notes:       ZAKIA WOULD LIKE TO KNOW IF IT WILL BE SCHEDULED OR DOES SHE NEED TO SCHEDULE

## 2022-07-15 NOTE — TELEPHONE ENCOUNTER
Caller: Berry Zakia A    Relationship: Self    Best call back number: [phone]     Requested Prescriptions:     ALPRAZolam (XANAX) 0.5 MG tablet  0.5 mg, 2 Times Daily 1 ordered  EditCancel Reorder       Summary: Take 1 tablet by mouth 2 (Two) Times a Day., Starting Tue 1/18/2022, Normal        [facility] [address] [facility]      Pharmacy where request should be sent:    [facility] [phone] [place] [address] [facility] [phone]    Additional details provided by patient: ZAKIA SAYS THE ILIA WORKED FOR HER SHE WOULD LIKE REFILL SENT TO PHARMACY. SHE ALSO WANT TO KNOW IF THE CT SHE HAD DONE AT [facility] ,IS RECEIVED BY DR. VARELA    WILL BE OUT OF INHALER WEEKEND     Does the patient have less than a 3 day supply:  [x] Yes  [] No    Andie Banuelos   07/15/22 10:19 EDT

## 2022-07-17 RX ORDER — ALPRAZOLAM 0.5 MG/1
1 TABLET ORAL 2 TIMES DAILY
Qty: 60 TABLET | Refills: 4 | Status: SHIPPED | OUTPATIENT
Start: 2022-07-17 | End: 2023-02-22

## 2022-07-18 ENCOUNTER — TELEPHONE (OUTPATIENT)
Dept: FAMILY MEDICINE CLINIC | Facility: CLINIC | Age: 67
End: 2022-07-18

## 2022-07-18 DIAGNOSIS — F41.9 ANXIETY: Primary | ICD-10-CM

## 2022-07-18 DIAGNOSIS — Z12.31 ENCOUNTER FOR SCREENING MAMMOGRAM FOR BREAST CANCER: Primary | ICD-10-CM

## 2022-07-18 RX ORDER — DIAZEPAM 5 MG/1
5 TABLET ORAL
Qty: 2 TABLET | Refills: 0 | Status: SHIPPED | OUTPATIENT
Start: 2022-07-18 | End: 2022-07-18

## 2022-07-18 RX ORDER — BUDESONIDE, GLYCOPYRROLATE, AND FORMOTEROL FUMARATE 160; 9; 4.8 UG/1; UG/1; UG/1
2 AEROSOL, METERED RESPIRATORY (INHALATION) 2 TIMES DAILY
Qty: 10.7 G | Refills: 0 | Status: SHIPPED | OUTPATIENT
Start: 2022-07-18 | End: 2022-08-19

## 2022-07-18 NOTE — PROGRESS NOTES
CT still shows the ground glass that may be involving into chronic lung meaning COVID pneumonia could be having a long term issue.  However- it also shows a new lesion- could be infection could be something else.  Would like to get PET scan to determine if we need to worry or not

## 2022-07-18 NOTE — TELEPHONE ENCOUNTER
Caller: Kitty Berry    Relationship: Self    Best call back number: [phone]    What medication are you requesting: ANXIETY MEDICATION     What are your current symptoms: CLAUSTROPHOBIA     How long have you been experiencing symptoms: N/A    Have you had these symptoms before:    [x] Yes  [] No    Have you been treated for these symptoms before:   [x] Yes  [] No    If a prescription is needed, what is your preferred pharmacy and phone number: [facility] [phone] [place], [place] [phone]      Additional notes:    PATIENT CALLED AND SAID THAT SHE IS CLAUSTROPHOBIC AND WILL NEED SOMETHING TO HELP EASE HER NERVES FOR THE PET SCAN    SHE SAID SHE KNOWS SHE HAS THE XANAX, BUT MAY NEED SOMETHING IN ADDITION TO IT

## 2022-07-18 NOTE — TELEPHONE ENCOUNTER
Caller: Kitty Berry    Relationship: Self    Best call back number: [phone]    What medication are you requesting: BREZTRI INHALER     What are your current symptoms: N/A    How long have you been experiencing symptoms: N/A    Have you had these symptoms before:    [x] Yes  [] No    Have you been treated for these symptoms before:   [x] Yes  [] No    If a prescription is needed, what is your preferred pharmacy and phone number: [facility] [phone] [place], [place] [phone]      Additional notes:    PATIENT CALLED AND SAID THAT SHE WAS GIVEN A COUPLE OF SAMPLES OF THE BREZTRI INHALER, AND WOULD LIKE A PRESCRIPTION SENT IN FOR IT

## 2022-07-29 ENCOUNTER — HOSPITAL ENCOUNTER (OUTPATIENT)
Dept: PET IMAGING | Facility: HOSPITAL | Age: 67
Discharge: HOME | End: 2022-07-29
Admitting: INTERNAL MEDICINE

## 2022-07-29 DIAGNOSIS — R91.8 LUNG MASS: ICD-10-CM

## 2022-07-29 LAB — GLUCOSE BLDC GLUCOMTR-MCNC: 93 MG/DL (ref 70–105)

## 2022-07-29 PROCEDURE — 82962 GLUCOSE BLOOD TEST: CPT

## 2022-07-29 PROCEDURE — 0 FLUDEOXYGLUCOSE F18 SOLUTION: Performed by: INTERNAL MEDICINE

## 2022-07-29 PROCEDURE — A9552 F18 FDG: HCPCS | Performed by: INTERNAL MEDICINE

## 2022-07-29 PROCEDURE — 78815 PET IMAGE W/CT SKULL-THIGH: CPT

## 2022-07-29 RX ADMIN — FLUDEOXYGLUCOSE F18 1 DOSE: 300 INJECTION INTRAVENOUS at 09:22

## 2022-08-15 ENCOUNTER — APPOINTMENT (OUTPATIENT)
Dept: MAMMOGRAPHY | Facility: HOSPITAL | Age: 67
End: 2022-08-15

## 2022-08-19 RX ORDER — BUDESONIDE, GLYCOPYRROLATE, AND FORMOTEROL FUMARATE 160; 9; 4.8 UG/1; UG/1; UG/1
2 AEROSOL, METERED RESPIRATORY (INHALATION) 2 TIMES DAILY
Qty: 10.7 G | Refills: 0 | Status: SHIPPED | OUTPATIENT
Start: 2022-08-19 | End: 2022-09-26

## 2022-08-29 ENCOUNTER — HOSPITAL ENCOUNTER (OUTPATIENT)
Dept: MAMMOGRAPHY | Facility: HOSPITAL | Age: 67
Discharge: HOME | End: 2022-08-29
Admitting: INTERNAL MEDICINE

## 2022-08-29 DIAGNOSIS — Z12.31 ENCOUNTER FOR SCREENING MAMMOGRAM FOR BREAST CANCER: ICD-10-CM

## 2022-08-29 PROCEDURE — 77067 SCR MAMMO BI INCL CAD: CPT

## 2022-08-29 PROCEDURE — 77063 BREAST TOMOSYNTHESIS BI: CPT

## 2022-09-26 RX ORDER — BUDESONIDE, GLYCOPYRROLATE, AND FORMOTEROL FUMARATE 160; 9; 4.8 UG/1; UG/1; UG/1
2 AEROSOL, METERED RESPIRATORY (INHALATION) 2 TIMES DAILY
Qty: 10.7 G | Refills: 0 | Status: SHIPPED | OUTPATIENT
Start: 2022-09-26 | End: 2022-11-16

## 2022-11-16 DIAGNOSIS — E03.9 ACQUIRED HYPOTHYROIDISM: ICD-10-CM

## 2022-11-16 RX ORDER — LEVOTHYROXINE SODIUM 0.05 MG/1
TABLET ORAL
Qty: 90 TABLET | Refills: 1 | Status: SHIPPED | OUTPATIENT
Start: 2022-11-16

## 2022-11-16 RX ORDER — BUDESONIDE, GLYCOPYRROLATE, AND FORMOTEROL FUMARATE 160; 9; 4.8 UG/1; UG/1; UG/1
AEROSOL, METERED RESPIRATORY (INHALATION)
Qty: 10.7 G | Refills: 0 | Status: SHIPPED | OUTPATIENT
Start: 2022-11-16 | End: 2023-03-10 | Stop reason: SDUPTHER

## 2022-11-28 ENCOUNTER — LAB (OUTPATIENT)
Dept: FAMILY MEDICINE CLINIC | Facility: CLINIC | Age: 67
End: 2022-11-28

## 2022-11-28 DIAGNOSIS — E03.9 ACQUIRED HYPOTHYROIDISM: Primary | ICD-10-CM

## 2022-11-28 DIAGNOSIS — E78.5 HYPERLIPIDEMIA, UNSPECIFIED HYPERLIPIDEMIA TYPE: ICD-10-CM

## 2022-11-28 DIAGNOSIS — I10 PRIMARY HYPERTENSION: ICD-10-CM

## 2022-11-28 DIAGNOSIS — E55.9 VITAMIN D DEFICIENCY: ICD-10-CM

## 2022-11-28 LAB
25(OH)D3 SERPL-MCNC: 62.2 NG/ML (ref 30–100)
ALBUMIN SERPL-MCNC: 4.3 G/DL (ref 3.5–5.2)
ALBUMIN/GLOB SERPL: 1.8 G/DL
ALP SERPL-CCNC: 93 U/L (ref 39–117)
ALT SERPL W P-5'-P-CCNC: 71 U/L (ref 1–33)
ANION GAP SERPL CALCULATED.3IONS-SCNC: 10.2 MMOL/L (ref 5–15)
AST SERPL-CCNC: 37 U/L (ref 1–32)
BACTERIA UR QL AUTO: NORMAL /HPF
BASOPHILS # BLD AUTO: 0.03 10*3/MM3 (ref 0–0.2)
BASOPHILS NFR BLD AUTO: 0.6 % (ref 0–1.5)
BILIRUB SERPL-MCNC: 0.3 MG/DL (ref 0–1.2)
BILIRUB UR QL STRIP: NEGATIVE
BUN SERPL-MCNC: 13 MG/DL (ref 8–23)
BUN/CREAT SERPL: 16.5 (ref 7–25)
CALCIUM SPEC-SCNC: 9.2 MG/DL (ref 8.6–10.5)
CHLORIDE SERPL-SCNC: 103 MMOL/L (ref 98–107)
CHOLEST SERPL-MCNC: 227 MG/DL (ref 0–200)
CLARITY UR: CLEAR
CO2 SERPL-SCNC: 27.8 MMOL/L (ref 22–29)
COLOR UR: ABNORMAL
CREAT SERPL-MCNC: 0.79 MG/DL (ref 0.57–1)
DEPRECATED RDW RBC AUTO: 41.1 FL (ref 37–54)
EGFRCR SERPLBLD CKD-EPI 2021: 81.1 ML/MIN/1.73
EOSINOPHIL # BLD AUTO: 0.12 10*3/MM3 (ref 0–0.4)
EOSINOPHIL NFR BLD AUTO: 2.2 % (ref 0.3–6.2)
ERYTHROCYTE [DISTWIDTH] IN BLOOD BY AUTOMATED COUNT: 12.8 % (ref 12.3–15.4)
GLOBULIN UR ELPH-MCNC: 2.4 GM/DL
GLUCOSE SERPL-MCNC: 108 MG/DL (ref 65–99)
GLUCOSE UR STRIP-MCNC: NEGATIVE MG/DL
HCT VFR BLD AUTO: 37.1 % (ref 34–46.6)
HDLC SERPL-MCNC: 59 MG/DL (ref 40–60)
HGB BLD-MCNC: 12.5 G/DL (ref 12–15.9)
HGB UR QL STRIP.AUTO: NEGATIVE
HYALINE CASTS UR QL AUTO: NORMAL /LPF
IMM GRANULOCYTES # BLD AUTO: 0.02 10*3/MM3 (ref 0–0.05)
IMM GRANULOCYTES NFR BLD AUTO: 0.4 % (ref 0–0.5)
KETONES UR QL STRIP: ABNORMAL
LDLC SERPL CALC-MCNC: 138 MG/DL (ref 0–100)
LDLC/HDLC SERPL: 2.27 {RATIO}
LEUKOCYTE ESTERASE UR QL STRIP.AUTO: ABNORMAL
LYMPHOCYTES # BLD AUTO: 1.7 10*3/MM3 (ref 0.7–3.1)
LYMPHOCYTES NFR BLD AUTO: 31.6 % (ref 19.6–45.3)
MCH RBC QN AUTO: 29.4 PG (ref 26.6–33)
MCHC RBC AUTO-ENTMCNC: 33.7 G/DL (ref 31.5–35.7)
MCV RBC AUTO: 87.3 FL (ref 79–97)
MONOCYTES # BLD AUTO: 0.39 10*3/MM3 (ref 0.1–0.9)
MONOCYTES NFR BLD AUTO: 7.2 % (ref 5–12)
NEUTROPHILS NFR BLD AUTO: 3.12 10*3/MM3 (ref 1.7–7)
NEUTROPHILS NFR BLD AUTO: 58 % (ref 42.7–76)
NITRITE UR QL STRIP: NEGATIVE
NRBC BLD AUTO-RTO: 0 /100 WBC (ref 0–0.2)
PH UR STRIP.AUTO: 6.5 [PH] (ref 5–8)
PLATELET # BLD AUTO: 202 10*3/MM3 (ref 140–450)
PMV BLD AUTO: 10.1 FL (ref 6–12)
POTASSIUM SERPL-SCNC: 4.3 MMOL/L (ref 3.5–5.2)
PROT SERPL-MCNC: 6.7 G/DL (ref 6–8.5)
PROT UR QL STRIP: ABNORMAL
RBC # BLD AUTO: 4.25 10*6/MM3 (ref 3.77–5.28)
RBC # UR STRIP: NORMAL /HPF
REF LAB TEST METHOD: NORMAL
SODIUM SERPL-SCNC: 141 MMOL/L (ref 136–145)
SP GR UR STRIP: 1.02 (ref 1–1.03)
SQUAMOUS #/AREA URNS HPF: NORMAL /HPF
T4 FREE SERPL-MCNC: 1.43 NG/DL (ref 0.93–1.7)
TRIGL SERPL-MCNC: 169 MG/DL (ref 0–150)
TSH SERPL DL<=0.05 MIU/L-ACNC: 3.01 UIU/ML (ref 0.27–4.2)
UROBILINOGEN UR QL STRIP: ABNORMAL
VLDLC SERPL-MCNC: 30 MG/DL (ref 5–40)
WBC # UR STRIP: NORMAL /HPF
WBC NRBC COR # BLD: 5.38 10*3/MM3 (ref 3.4–10.8)

## 2022-11-28 PROCEDURE — 36415 COLL VENOUS BLD VENIPUNCTURE: CPT

## 2022-11-28 PROCEDURE — 85025 COMPLETE CBC W/AUTO DIFF WBC: CPT | Performed by: INTERNAL MEDICINE

## 2022-11-28 PROCEDURE — 81001 URINALYSIS AUTO W/SCOPE: CPT | Performed by: INTERNAL MEDICINE

## 2022-11-28 PROCEDURE — 80061 LIPID PANEL: CPT | Performed by: INTERNAL MEDICINE

## 2022-11-28 PROCEDURE — 80053 COMPREHEN METABOLIC PANEL: CPT | Performed by: INTERNAL MEDICINE

## 2022-11-28 PROCEDURE — 82306 VITAMIN D 25 HYDROXY: CPT | Performed by: INTERNAL MEDICINE

## 2022-11-28 PROCEDURE — 84443 ASSAY THYROID STIM HORMONE: CPT | Performed by: INTERNAL MEDICINE

## 2022-11-28 PROCEDURE — 84439 ASSAY OF FREE THYROXINE: CPT | Performed by: INTERNAL MEDICINE

## 2022-12-02 ENCOUNTER — OFFICE VISIT (OUTPATIENT)
Dept: FAMILY MEDICINE CLINIC | Facility: CLINIC | Age: 67
End: 2022-12-02

## 2022-12-02 VITALS
SYSTOLIC BLOOD PRESSURE: 132 MMHG | HEART RATE: 71 BPM | RESPIRATION RATE: 16 BRPM | WEIGHT: 206 LBS | TEMPERATURE: 97.5 F | DIASTOLIC BLOOD PRESSURE: 90 MMHG | BODY MASS INDEX: 37.91 KG/M2 | OXYGEN SATURATION: 98 % | HEIGHT: 62 IN

## 2022-12-02 DIAGNOSIS — M51.37 DDD (DEGENERATIVE DISC DISEASE), LUMBOSACRAL: ICD-10-CM

## 2022-12-02 DIAGNOSIS — Z78.0 POSTMENOPAUSE: ICD-10-CM

## 2022-12-02 DIAGNOSIS — M15.9 PRIMARY OSTEOARTHRITIS INVOLVING MULTIPLE JOINTS: ICD-10-CM

## 2022-12-02 DIAGNOSIS — C50.919 MALIGNANT NEOPLASM OF FEMALE BREAST, UNSPECIFIED ESTROGEN RECEPTOR STATUS, UNSPECIFIED LATERALITY, UNSPECIFIED SITE OF BREAST: ICD-10-CM

## 2022-12-02 DIAGNOSIS — Z00.00 MEDICARE ANNUAL WELLNESS VISIT, INITIAL: Primary | ICD-10-CM

## 2022-12-02 DIAGNOSIS — E03.9 ACQUIRED HYPOTHYROIDISM: ICD-10-CM

## 2022-12-02 DIAGNOSIS — J45.41 MODERATE PERSISTENT REACTIVE AIRWAY DISEASE WITH ACUTE EXACERBATION: ICD-10-CM

## 2022-12-02 PROCEDURE — 1170F FXNL STATUS ASSESSED: CPT | Performed by: INTERNAL MEDICINE

## 2022-12-02 PROCEDURE — 99213 OFFICE O/P EST LOW 20 MIN: CPT | Performed by: INTERNAL MEDICINE

## 2022-12-02 PROCEDURE — 1160F RVW MEDS BY RX/DR IN RCRD: CPT | Performed by: INTERNAL MEDICINE

## 2022-12-02 PROCEDURE — G0439 PPPS, SUBSEQ VISIT: HCPCS | Performed by: INTERNAL MEDICINE

## 2022-12-02 RX ORDER — AMLODIPINE BESYLATE 5 MG/1
5 TABLET ORAL DAILY
Qty: 90 TABLET | Refills: 2 | Status: SHIPPED | OUTPATIENT
Start: 2022-12-02

## 2022-12-21 ENCOUNTER — TELEPHONE (OUTPATIENT)
Dept: FAMILY MEDICINE CLINIC | Facility: CLINIC | Age: 67
End: 2022-12-21

## 2022-12-21 NOTE — TELEPHONE ENCOUNTER
Hub staff attempted to follow warm transfer process and was unsuccessful     Caller: Kitty Berry    Relationship to patient: Self    Best call back number: [phone]    Patient is needing: PATIENT WOULD LIKE TO RESCHEDULE HER LABS THAT ARE ON 3/10/23 TO 3/6/23 AROUND 10AM       PLEASE CALL AND ADVISE

## 2022-12-21 NOTE — TELEPHONE ENCOUNTER
HUB TO SHARE: SENT FOLLOWING [employer] MESSAGE. You aren't scheduled for labs in March it is an appointment with Dr Mcclure and I would not be able to move that to the requested time.

## 2022-12-30 ENCOUNTER — HOSPITAL ENCOUNTER (OUTPATIENT)
Dept: BONE DENSITY | Facility: HOSPITAL | Age: 67
Discharge: HOME | End: 2022-12-30
Admitting: INTERNAL MEDICINE

## 2022-12-30 DIAGNOSIS — Z78.0 POSTMENOPAUSE: ICD-10-CM

## 2022-12-30 PROCEDURE — 77080 DXA BONE DENSITY AXIAL: CPT

## 2023-01-18 RX ORDER — ALBUTEROL SULFATE 90 UG/1
AEROSOL, METERED RESPIRATORY (INHALATION)
Qty: 8.5 G | Refills: 3 | Status: SHIPPED | OUTPATIENT
Start: 2023-01-18

## 2023-02-22 DIAGNOSIS — F41.9 ANXIETY: ICD-10-CM

## 2023-02-22 RX ORDER — ALPRAZOLAM 0.5 MG/1
TABLET ORAL
Qty: 60 TABLET | Refills: 3 | Status: SHIPPED | OUTPATIENT
Start: 2023-02-22

## 2023-03-01 ENCOUNTER — TELEPHONE (OUTPATIENT)
Dept: FAMILY MEDICINE CLINIC | Facility: CLINIC | Age: 68
End: 2023-03-01
Payer: MEDICARE

## 2023-03-01 DIAGNOSIS — I10 ESSENTIAL HYPERTENSION: Primary | ICD-10-CM

## 2023-03-01 NOTE — TELEPHONE ENCOUNTER
Patient is scheduled for a 3 month follow up and labs 1 week prior, there are not any labs notes from last time as you discussed at visit, what would you like to have done for her upcoming labs?

## 2023-03-03 RX ORDER — DICLOFENAC SODIUM 75 MG/1
TABLET, DELAYED RELEASE ORAL
Qty: 60 TABLET | Refills: 2 | Status: SHIPPED | OUTPATIENT
Start: 2023-03-03

## 2023-03-06 ENCOUNTER — LAB (OUTPATIENT)
Dept: FAMILY MEDICINE CLINIC | Facility: CLINIC | Age: 68
End: 2023-03-06
Payer: MEDICARE

## 2023-03-06 DIAGNOSIS — I10 ESSENTIAL HYPERTENSION: ICD-10-CM

## 2023-03-06 LAB
ALBUMIN SERPL-MCNC: 4.5 G/DL (ref 3.5–5.2)
ALBUMIN/GLOB SERPL: 2 G/DL
ALP SERPL-CCNC: 100 U/L (ref 39–117)
ALT SERPL W P-5'-P-CCNC: 70 U/L (ref 1–33)
ANION GAP SERPL CALCULATED.3IONS-SCNC: 9.6 MMOL/L (ref 5–15)
AST SERPL-CCNC: 39 U/L (ref 1–32)
BILIRUB SERPL-MCNC: 0.4 MG/DL (ref 0–1.2)
BUN SERPL-MCNC: 12 MG/DL (ref 8–23)
BUN/CREAT SERPL: 14.1 (ref 7–25)
CALCIUM SPEC-SCNC: 9.6 MG/DL (ref 8.6–10.5)
CHLORIDE SERPL-SCNC: 99 MMOL/L (ref 98–107)
CHOLEST SERPL-MCNC: 233 MG/DL (ref 0–200)
CO2 SERPL-SCNC: 30.4 MMOL/L (ref 22–29)
CREAT SERPL-MCNC: 0.85 MG/DL (ref 0.57–1)
EGFRCR SERPLBLD CKD-EPI 2021: 74.3 ML/MIN/1.73
GLOBULIN UR ELPH-MCNC: 2.3 GM/DL
GLUCOSE SERPL-MCNC: 96 MG/DL (ref 65–99)
HDLC SERPL-MCNC: 67 MG/DL (ref 40–60)
LDLC SERPL CALC-MCNC: 136 MG/DL (ref 0–100)
LDLC/HDLC SERPL: 1.97 {RATIO}
POTASSIUM SERPL-SCNC: 4.2 MMOL/L (ref 3.5–5.2)
PROT SERPL-MCNC: 6.8 G/DL (ref 6–8.5)
SODIUM SERPL-SCNC: 139 MMOL/L (ref 136–145)
TRIGL SERPL-MCNC: 171 MG/DL (ref 0–150)
VLDLC SERPL-MCNC: 30 MG/DL (ref 5–40)

## 2023-03-06 PROCEDURE — 80061 LIPID PANEL: CPT | Performed by: INTERNAL MEDICINE

## 2023-03-06 PROCEDURE — 80053 COMPREHEN METABOLIC PANEL: CPT | Performed by: INTERNAL MEDICINE

## 2023-03-06 PROCEDURE — 36415 COLL VENOUS BLD VENIPUNCTURE: CPT

## 2023-03-10 ENCOUNTER — OFFICE VISIT (OUTPATIENT)
Dept: FAMILY MEDICINE CLINIC | Facility: CLINIC | Age: 68
End: 2023-03-10
Payer: MEDICARE

## 2023-03-10 VITALS
SYSTOLIC BLOOD PRESSURE: 131 MMHG | HEIGHT: 62 IN | BODY MASS INDEX: 38.28 KG/M2 | OXYGEN SATURATION: 99 % | WEIGHT: 208 LBS | DIASTOLIC BLOOD PRESSURE: 87 MMHG | HEART RATE: 78 BPM

## 2023-03-10 DIAGNOSIS — E03.9 ACQUIRED HYPOTHYROIDISM: Primary | ICD-10-CM

## 2023-03-10 DIAGNOSIS — I10 PRIMARY HYPERTENSION: ICD-10-CM

## 2023-03-10 DIAGNOSIS — J45.41 MODERATE PERSISTENT REACTIVE AIRWAY DISEASE WITH ACUTE EXACERBATION: ICD-10-CM

## 2023-03-10 PROBLEM — Z76.89 ENCOUNTER FOR SUPPORT AND COORDINATION OF TRANSITION OF CARE: Status: RESOLVED | Noted: 2021-01-30 | Resolved: 2023-03-10

## 2023-03-10 PROCEDURE — 1159F MED LIST DOCD IN RCRD: CPT | Performed by: INTERNAL MEDICINE

## 2023-03-10 PROCEDURE — 3079F DIAST BP 80-89 MM HG: CPT | Performed by: INTERNAL MEDICINE

## 2023-03-10 PROCEDURE — 1160F RVW MEDS BY RX/DR IN RCRD: CPT | Performed by: INTERNAL MEDICINE

## 2023-03-10 PROCEDURE — 3075F SYST BP GE 130 - 139MM HG: CPT | Performed by: INTERNAL MEDICINE

## 2023-03-10 PROCEDURE — 99214 OFFICE O/P EST MOD 30 MIN: CPT | Performed by: INTERNAL MEDICINE

## 2023-03-10 RX ORDER — BUDESONIDE, GLYCOPYRROLATE, AND FORMOTEROL FUMARATE 160; 9; 4.8 UG/1; UG/1; UG/1
2 AEROSOL, METERED RESPIRATORY (INHALATION) 2 TIMES DAILY
Qty: 10.7 G | Refills: 5 | Status: SHIPPED | OUTPATIENT
Start: 2023-03-10

## 2023-03-10 NOTE — PROGRESS NOTES
Rooming Tab(CC,VS,Pt Hx,Fall Screen)  Chief Complaint   Patient presents with   • Hypertension   • Hypothyroidism       Subjective        The patient presents today for a follow-up.    Dyspnea  The patient's breathing is still an issue. She still uses her inhaler and feels like the inhaler does improve her symptoms. She still gets dyspnea if she does very much. She does not think she can do a lot breathing wise. She could do a half a mile. She does not do much exercise.    Hypothyroidism  The patient does not feel extremely tired, gaining weight, constipated, or super cold.    Hypertension  The patient checks her blood pressure at home and it is usually lower at home than it was today. She denies feeling like she is going to pass out and  that she has slight ankle edema. She takes amlodipine once a day before she goes to bed.    Arthritis  The patient is taking diclofenac for her arthritis and she feels like it improves her symptoms. She takes it with food.    Acid reflux  The patient does not feel the acid, but she feels gassy. She has been taking omeprazole every day.    Cough  The patient still has her cough and it only bothers her when she gets up and then she gets it out of her system. She takes her omeprazole during the night.    Kidney pain  The patient has pain in her kidney area. She denies any pain with urination.    Anxiety and depression  The patient denies any issues with anxiety or depression.    Health maintenance  The patient's last colonoscopy was right before COVID-19. They found a 3 mm tubular adenoma.    I have reviewed and updated her medications, medical history and problem list during today's office visit.     Patient Care Team:  Nadia Mcclure MD as PCP - General    Problem List Tab  Medications Tab  Synopsis Tab  Chart Review Tab  Care Everywhere Tab  Immunizations Tab  Patient History Tab    Social History     Tobacco Use   • Smoking status: Former     Types: Cigarettes     Quit  "date:      Years since quittin.2   • Smokeless tobacco: Never   Substance Use Topics   • Alcohol use: Yes       Review of Systems    Objective     Rooming Tab(CC,VS,Pt Hx,Fall Screen)  /87   Pulse 78   Ht 157.5 cm (62\")   Wt 94.3 kg (208 lb)   SpO2 99%   BMI 38.04 kg/m²     Body mass index is 38.04 kg/m².    Physical Exam  Vitals and nursing note reviewed.   Constitutional:       Appearance: Normal appearance. She is well-developed.   HENT:      Head: Normocephalic and atraumatic.      Right Ear: Tympanic membrane normal.      Left Ear: Tympanic membrane normal.      Nose: No rhinorrhea.      Mouth/Throat:      Pharynx: No posterior oropharyngeal erythema.   Eyes:      Pupils: Pupils are equal, round, and reactive to light.   Cardiovascular:      Rate and Rhythm: Normal rate and regular rhythm.      Pulses: Normal pulses.      Heart sounds: Normal heart sounds. No murmur heard.  Pulmonary:      Effort: Pulmonary effort is normal.      Breath sounds: Normal breath sounds.   Abdominal:      General: Bowel sounds are normal. There is no distension.      Palpations: Abdomen is soft.   Musculoskeletal:         General: No tenderness.      Cervical back: Normal range of motion and neck supple.   Skin:     Capillary Refill: Capillary refill takes less than 2 seconds.   Neurological:      Mental Status: She is alert and oriented to person, place, and time.   Psychiatric:         Mood and Affect: Mood normal.         Behavior: Behavior normal.          Statin Choice Calculator  Data Reviewed:         The data below has been reviewed by Nadia Mcclure MD on 03/10/2023.      Lab Results   Component Value Date    BUN 12 2023    CREATININE 0.85 2023     2023    K 4.2 2023    CL 99 2023    CALCIUM 9.6 2023    ALBUMIN 4.5 2023    BILITOT 0.4 2023    ALKPHOS 100 2023    AST 39 (H) 2023    ALT 70 (H) 2023    TRIG 171 (H) 2023    " HDL 67 (H) 03/06/2023    VLDL 30 03/06/2023     (H) 03/06/2023    LDLHDL 1.97 03/06/2023      Assessment & Plan   Order Review Tab  Health Maintenance Tab  Patient Plan/Order Tab  Diagnoses and all orders for this visit:    1. Acquired hypothyroidism (Primary)    2. Primary hypertension    3. Moderate persistent reactive airway disease with acute exacerbation    Other orders  -     Budeson-Glycopyrrol-Formoterol (Breztri Aerosphere) 160-9-4.8 MCG/ACT aerosol inhaler; Inhale 2 puffs 2 (Two) Times a Day.  Dispense: 10.7 g; Refill: 5      1. Hypertension  - She will continue taking amlodipine 5 mg twice daily.    2. Hypothyroidism  - She will continue taking levothyroxine as prescribed.    3. Arthritis  - She will continue taking diclofenac as prescribed.    4. Cough  - She will continue taking omeprazole as prescribed.      Wrapup Tab  Return if symptoms worsen or fail to improve.       They were informed of the diagnosis and treatment plan and directed to f/u for any further problems or concerns.      Transcribed from ambient dictation for Nadia Mcclure MD by Jessa Shaver.  03/10/23   12:49 EST    Patient or patient representative verbalized consent to the visit recording.  I have personally performed the services described in this document as transcribed by the above individual, and it is both accurate and complete.  Nadia Mcclure MD  3/12/2023  20:17 EDT

## 2023-05-17 ENCOUNTER — OFFICE VISIT (OUTPATIENT)
Dept: FAMILY MEDICINE CLINIC | Facility: CLINIC | Age: 68
End: 2023-05-17
Payer: MEDICARE

## 2023-05-17 VITALS
OXYGEN SATURATION: 97 % | SYSTOLIC BLOOD PRESSURE: 110 MMHG | WEIGHT: 205.6 LBS | HEART RATE: 91 BPM | DIASTOLIC BLOOD PRESSURE: 84 MMHG | BODY MASS INDEX: 37.84 KG/M2 | HEIGHT: 62 IN

## 2023-05-17 DIAGNOSIS — R91.8 LUNG MASS: Primary | ICD-10-CM

## 2023-05-17 DIAGNOSIS — M70.62 TROCHANTERIC BURSITIS OF LEFT HIP: ICD-10-CM

## 2023-05-17 RX ORDER — OMEPRAZOLE 40 MG/1
40 CAPSULE, DELAYED RELEASE ORAL DAILY
Qty: 90 CAPSULE | Refills: 1 | Status: SHIPPED | OUTPATIENT
Start: 2023-05-17

## 2023-05-17 RX ORDER — METHYLPREDNISOLONE ACETATE 80 MG/ML
80 INJECTION, SUSPENSION INTRA-ARTICULAR; INTRALESIONAL; INTRAMUSCULAR; SOFT TISSUE ONCE
Status: SHIPPED | OUTPATIENT
Start: 2023-05-17

## 2023-05-17 RX ORDER — MELOXICAM 15 MG/1
15 TABLET ORAL DAILY
COMMUNITY
Start: 2023-05-01

## 2023-05-17 NOTE — PROGRESS NOTES
"Rooming Tab(CC,VS,Pt Hx,Fall Screen)  Chief Complaint   Patient presents with   • Back Pain   • Shortness of Breath     Follow up on lungs       Subjective   Patient had covid 2020. She still notes she gets out of breath sometimes. She does breztri daily and uses albuterol intermittently but at least daily. Cleaning house, walking, going up flights of stairs make her SOB, she denies CP, swelling in her legs, or feelings of syncope.     Patient has lower back pain - standing makes it worse. She volunteers at a thrift store, on  she works a long day and at the end of the day she notices that her back hurts. When she bent over yesterday felt like it got \"stuck\". Just did ice at home. On meloxicam takes it in the morning.     Notes belching and bowel gas over the last couple of months. She is on prilosec 20mg. Drinks a lot of carbonated drinks.     I have reviewed and updated her medications, medical history and problem list during today's office visit.     Patient Care Team:  Nadia Mcclure MD as PCP - General    Problem List Tab  Medications Tab  Synopsis Tab  Chart Review Tab  Care Everywhere Tab  Immunizations Tab  Patient History Tab    Social History     Tobacco Use   • Smoking status: Former     Types: Cigarettes     Quit date:      Years since quittin.4   • Smokeless tobacco: Never   Substance Use Topics   • Alcohol use: Yes       Objective     Rooming Tab(CC,VS,Pt Hx,Fall Screen)  /84   Pulse 91   Ht 157.5 cm (62.01\")   Wt 93.3 kg (205 lb 9.6 oz)   SpO2 97%   BMI 37.60 kg/m²     Body mass index is 37.6 kg/m².    Physical Exam  Vitals reviewed.   Constitutional:       Appearance: Normal appearance.   HENT:      Head: Normocephalic and atraumatic.      Right Ear: External ear normal.      Left Ear: External ear normal.      Nose: Nose normal. No congestion.   Eyes:      Conjunctiva/sclera: Conjunctivae normal.      Pupils: Pupils are equal, round, and reactive to light. "   Cardiovascular:      Rate and Rhythm: Normal rate and regular rhythm.      Pulses: Normal pulses.      Heart sounds: Normal heart sounds.   Pulmonary:      Effort: Pulmonary effort is normal.      Breath sounds: Normal breath sounds.   Abdominal:      General: Abdomen is flat. Bowel sounds are normal.      Palpations: Abdomen is soft.   Musculoskeletal:         General: Normal range of motion.      Cervical back: Normal range of motion and neck supple.      Comments: TTP over lumbar spine. TTP over the greater trochanteric bursa, negative KADY/FADIR testing.   Skin:     General: Skin is warm and dry.      Capillary Refill: Capillary refill takes less than 2 seconds.   Neurological:      General: No focal deficit present.      Mental Status: She is alert and oriented to person, place, and time.   Psychiatric:         Mood and Affect: Mood normal.          Statin Choice Calculator  Data Reviewed:        Procedure Note:   Procedure performed:Left greater trochanter injection    The risks (including but not limited pain, bleeding and infection) and benefits of the procedure  were discussed with patient. Questions were sought and answered and informed consent was given for the procedure.     The procedure site was prepped and draped in standard bedside fashion for the procedure as indicated. The skin and deeper tissue was anesthetized with 1 cc's of Bupivacaine 0.25% without epinephrine. A 22 guage needle was utilized for the procedure and it was performed without complications.  The joint was injected with 1 cc's of depomedrol    The patient tolerated the procedure well and my repeat post-procedure exam was unremarkable for any problems related to the procedure    Instructions were given to contact us for any problems related to the procedure.      The data below has been reviewed by Nadia Mcclure MD on 05/17/2023.      Lab Results   Component Value Date    BUN 12 03/06/2023    CREATININE 0.85 03/06/2023    NA  139 03/06/2023    K 4.2 03/06/2023    CL 99 03/06/2023    CALCIUM 9.6 03/06/2023    ALBUMIN 4.5 03/06/2023    BILITOT 0.4 03/06/2023    ALKPHOS 100 03/06/2023    AST 39 (H) 03/06/2023    ALT 70 (H) 03/06/2023    TRIG 171 (H) 03/06/2023    HDL 67 (H) 03/06/2023    VLDL 30 03/06/2023     (H) 03/06/2023    LDLHDL 1.97 03/06/2023      Assessment & Plan    Patient presenting today with lumbar back pain that sometimes radiates to the L worst with standing. She likely has OA of the lumbar spine with TTP over the area and with TTP over greater trochanter on the L, favors dx of greater trochanteric bursitis . Patient also curious about getting repeat CT chest to evaluate her breathing status and previous nodule. She still struggles with SOB with exertion. Last CT and PET scans consistent with inflammatory process (COVID 2020). Heart mildly enlarged on these images but no clinical signs of HF contributing to SOB. Discussed weight loss and healthy habits. As for the increased gas production recommended decreasing amount of carbonated beverages and increasing water intake. States her bowel habits have not changed, no blood in the stool or weight loss, so no concern for serious pathology at this time. She also wakes up hoarse some mornings - possible component of GERD - currently on prilosec 20mg daily will increase to 40mg qd.   Order Review Tab  Health Maintenance Tab  Patient Plan/Order Tab  Diagnoses and all orders for this visit:    1. Lung mass (Primary)  Comments:  repeat scan for surveillance  Orders:  -     CT Chest With Contrast    2. Trochanteric bursitis of left hip  Comments:  tolerated hip injection well  Orders:  -     methylPREDNISolone acetate (DEPO-medrol) injection 80 mg    Other orders  -     omeprazole (priLOSEC) 40 MG capsule; Take 1 capsule by mouth Daily.  Dispense: 90 capsule; Refill: 1        Wrapup Tab  Return if symptoms worsen or fail to improve.       They were informed of the diagnosis and  treatment plan and directed to f/u for any further problems or concerns.    I have seen and examined Kitty Berry with resident, Dr. Willard and agree with findings and assessment and plan- tolerated injection well and will call if not improving    This document has been electronically signed by Nadia Mcclure MD on May 28, 2023 22:29 EDT

## 2023-05-26 DIAGNOSIS — E03.9 ACQUIRED HYPOTHYROIDISM: ICD-10-CM

## 2023-05-26 RX ORDER — LEVOTHYROXINE SODIUM 0.05 MG/1
TABLET ORAL
Qty: 90 TABLET | Refills: 0 | Status: SHIPPED | OUTPATIENT
Start: 2023-05-26

## 2023-05-28 PROBLEM — R91.8 LUNG MASS: Status: ACTIVE | Noted: 2023-05-28

## 2023-05-28 PROBLEM — M70.62 TROCHANTERIC BURSITIS OF LEFT HIP: Status: ACTIVE | Noted: 2023-05-28

## 2023-06-14 ENCOUNTER — HOSPITAL ENCOUNTER (OUTPATIENT)
Dept: CT IMAGING | Facility: HOSPITAL | Age: 68
Discharge: HOME | End: 2023-06-14
Admitting: INTERNAL MEDICINE
Payer: MEDICARE

## 2023-06-14 LAB
CREAT BLDA-MCNC: 0.9 MG/DL (ref 0.6–1.3)
EGFRCR SERPLBLD CKD-EPI 2021: 69.3 ML/MIN/1.73

## 2023-06-14 PROCEDURE — 82565 ASSAY OF CREATININE: CPT

## 2023-06-14 PROCEDURE — 25510000001 IOPAMIDOL PER 1 ML: Performed by: INTERNAL MEDICINE

## 2023-06-14 PROCEDURE — 71260 CT THORAX DX C+: CPT

## 2023-06-14 RX ADMIN — IOPAMIDOL 100 ML: 755 INJECTION, SOLUTION INTRAVENOUS at 15:27

## 2023-08-02 ENCOUNTER — OFFICE VISIT (OUTPATIENT)
Dept: FAMILY MEDICINE CLINIC | Facility: CLINIC | Age: 68
End: 2023-08-02
Payer: MEDICARE

## 2023-08-02 ENCOUNTER — LAB (OUTPATIENT)
Dept: FAMILY MEDICINE CLINIC | Facility: CLINIC | Age: 68
End: 2023-08-02
Payer: MEDICARE

## 2023-08-02 VITALS
OXYGEN SATURATION: 97 % | SYSTOLIC BLOOD PRESSURE: 100 MMHG | DIASTOLIC BLOOD PRESSURE: 78 MMHG | WEIGHT: 200 LBS | BODY MASS INDEX: 36.8 KG/M2 | HEART RATE: 82 BPM | HEIGHT: 62 IN

## 2023-08-02 DIAGNOSIS — K21.9 GASTROESOPHAGEAL REFLUX DISEASE WITHOUT ESOPHAGITIS: ICD-10-CM

## 2023-08-02 DIAGNOSIS — R53.83 OTHER FATIGUE: Primary | ICD-10-CM

## 2023-08-02 DIAGNOSIS — R21 RASH OF FOOT: ICD-10-CM

## 2023-08-02 DIAGNOSIS — E03.9 ACQUIRED HYPOTHYROIDISM: ICD-10-CM

## 2023-08-02 PROBLEM — H43.819 POSTERIOR VITREOUS DETACHMENT: Status: ACTIVE | Noted: 2018-12-03

## 2023-08-02 PROBLEM — H11.441 CONJUNCTIVAL CYST OF RIGHT EYE: Status: ACTIVE | Noted: 2022-01-18

## 2023-08-02 PROBLEM — H15.101 EPISCLERITIS OF RIGHT EYE: Status: ACTIVE | Noted: 2021-12-29

## 2023-08-02 PROBLEM — H43.819 VITREOUS DEGENERATION: Status: ACTIVE | Noted: 2017-10-25

## 2023-08-02 LAB
DEPRECATED RDW RBC AUTO: 46.1 FL (ref 37–54)
ERYTHROCYTE [DISTWIDTH] IN BLOOD BY AUTOMATED COUNT: 15.6 % (ref 12.3–15.4)
HCT VFR BLD AUTO: 35.6 % (ref 34–46.6)
HGB BLD-MCNC: 11.5 G/DL (ref 12–15.9)
MCH RBC QN AUTO: 26.7 PG (ref 26.6–33)
MCHC RBC AUTO-ENTMCNC: 32.3 G/DL (ref 31.5–35.7)
MCV RBC AUTO: 82.8 FL (ref 79–97)
PLATELET # BLD AUTO: 301 10*3/MM3 (ref 140–450)
PMV BLD AUTO: 10.6 FL (ref 6–12)
RBC # BLD AUTO: 4.3 10*6/MM3 (ref 3.77–5.28)
T4 FREE SERPL-MCNC: 1.14 NG/DL (ref 0.93–1.7)
TSH SERPL DL<=0.05 MIU/L-ACNC: 1.87 UIU/ML (ref 0.27–4.2)
WBC NRBC COR # BLD: 15.12 10*3/MM3 (ref 3.4–10.8)

## 2023-08-02 PROCEDURE — 84443 ASSAY THYROID STIM HORMONE: CPT | Performed by: STUDENT IN AN ORGANIZED HEALTH CARE EDUCATION/TRAINING PROGRAM

## 2023-08-02 PROCEDURE — 84439 ASSAY OF FREE THYROXINE: CPT | Performed by: STUDENT IN AN ORGANIZED HEALTH CARE EDUCATION/TRAINING PROGRAM

## 2023-08-02 PROCEDURE — 36415 COLL VENOUS BLD VENIPUNCTURE: CPT | Performed by: STUDENT IN AN ORGANIZED HEALTH CARE EDUCATION/TRAINING PROGRAM

## 2023-08-02 PROCEDURE — 3078F DIAST BP <80 MM HG: CPT | Performed by: STUDENT IN AN ORGANIZED HEALTH CARE EDUCATION/TRAINING PROGRAM

## 2023-08-02 PROCEDURE — 99214 OFFICE O/P EST MOD 30 MIN: CPT | Performed by: STUDENT IN AN ORGANIZED HEALTH CARE EDUCATION/TRAINING PROGRAM

## 2023-08-02 PROCEDURE — 3074F SYST BP LT 130 MM HG: CPT | Performed by: STUDENT IN AN ORGANIZED HEALTH CARE EDUCATION/TRAINING PROGRAM

## 2023-08-02 PROCEDURE — 85027 COMPLETE CBC AUTOMATED: CPT | Performed by: STUDENT IN AN ORGANIZED HEALTH CARE EDUCATION/TRAINING PROGRAM

## 2023-08-04 ENCOUNTER — PATIENT MESSAGE (OUTPATIENT)
Dept: FAMILY MEDICINE CLINIC | Facility: CLINIC | Age: 68
End: 2023-08-04
Payer: MEDICARE

## 2023-08-07 ENCOUNTER — TELEPHONE (OUTPATIENT)
Dept: FAMILY MEDICINE CLINIC | Facility: CLINIC | Age: 68
End: 2023-08-07
Payer: MEDICARE

## 2023-08-07 DIAGNOSIS — D72.829 LEUKOCYTOSIS, UNSPECIFIED TYPE: Primary | ICD-10-CM

## 2023-08-07 NOTE — TELEPHONE ENCOUNTER
From: Kitty Berry  To: Rashard Duran  Sent: 8/4/2023 11:14 AM EDT  Subject: Recent blood work.     Hi Dr Duran,   I was wondering if you had any thoughts on the recent bloodwork I had. I have looked at the results. It looks to me like the Thyroid numbers appear to be ok which was my initial concern because of taking the Omeprazole. My concern now is with the elevated WBC. Thank you!    Message left with the result notes.

## 2023-08-18 ENCOUNTER — HOSPITAL ENCOUNTER (OUTPATIENT)
Dept: RESPIRATORY THERAPY | Facility: HOSPITAL | Age: 68
Discharge: HOME | End: 2023-08-18
Payer: MEDICARE

## 2023-08-18 VITALS — RESPIRATION RATE: 16 BRPM | HEART RATE: 70 BPM

## 2023-08-18 DIAGNOSIS — J45.40 MODERATE PERSISTENT ASTHMA WITH IRREVERSIBLE AIRWAY OBSTRUCTION WITHOUT COMPLICATION: ICD-10-CM

## 2023-08-18 DIAGNOSIS — J44.9 MODERATE PERSISTENT ASTHMA WITH IRREVERSIBLE AIRWAY OBSTRUCTION WITHOUT COMPLICATION: ICD-10-CM

## 2023-08-18 PROCEDURE — 94727 GAS DIL/WSHOT DETER LNG VOL: CPT

## 2023-08-18 PROCEDURE — 94060 EVALUATION OF WHEEZING: CPT

## 2023-08-18 PROCEDURE — 94664 DEMO&/EVAL PT USE INHALER: CPT

## 2023-08-18 PROCEDURE — 94729 DIFFUSING CAPACITY: CPT

## 2023-08-18 RX ORDER — ALBUTEROL SULFATE 90 UG/1
2 AEROSOL, METERED RESPIRATORY (INHALATION) ONCE
Status: COMPLETED | OUTPATIENT
Start: 2023-08-18 | End: 2023-08-18

## 2023-08-18 RX ADMIN — ALBUTEROL SULFATE 2 PUFF: 108 AEROSOL, METERED RESPIRATORY (INHALATION) at 13:10

## 2023-08-21 ENCOUNTER — TELEPHONE (OUTPATIENT)
Dept: FAMILY MEDICINE CLINIC | Facility: CLINIC | Age: 68
End: 2023-08-21

## 2023-08-21 NOTE — TELEPHONE ENCOUNTER
Caller: Kitty Berry    Relationship to patient: Self    Best call back number: [phone]      Patient is needing: PATIENT IS NEEDING A LAB APPOINTMENT

## 2023-08-23 ENCOUNTER — LAB (OUTPATIENT)
Dept: FAMILY MEDICINE CLINIC | Facility: CLINIC | Age: 68
End: 2023-08-23
Payer: MEDICARE

## 2023-08-23 DIAGNOSIS — D72.829 LEUKOCYTOSIS, UNSPECIFIED TYPE: ICD-10-CM

## 2023-08-23 LAB
BASOPHILS # BLD AUTO: 0.04 10*3/MM3 (ref 0–0.2)
BASOPHILS NFR BLD AUTO: 0.6 % (ref 0–1.5)
DEPRECATED RDW RBC AUTO: 46.1 FL (ref 37–54)
EOSINOPHIL # BLD AUTO: 0.18 10*3/MM3 (ref 0–0.4)
EOSINOPHIL NFR BLD AUTO: 2.6 % (ref 0.3–6.2)
ERYTHROCYTE [DISTWIDTH] IN BLOOD BY AUTOMATED COUNT: 15.8 % (ref 12.3–15.4)
HCT VFR BLD AUTO: 35.1 % (ref 34–46.6)
HGB BLD-MCNC: 10.9 G/DL (ref 12–15.9)
IMM GRANULOCYTES # BLD AUTO: 0.02 10*3/MM3 (ref 0–0.05)
IMM GRANULOCYTES NFR BLD AUTO: 0.3 % (ref 0–0.5)
LYMPHOCYTES # BLD AUTO: 1.91 10*3/MM3 (ref 0.7–3.1)
LYMPHOCYTES NFR BLD AUTO: 27.9 % (ref 19.6–45.3)
MCH RBC QN AUTO: 25.4 PG (ref 26.6–33)
MCHC RBC AUTO-ENTMCNC: 31.1 G/DL (ref 31.5–35.7)
MCV RBC AUTO: 81.8 FL (ref 79–97)
MONOCYTES # BLD AUTO: 0.42 10*3/MM3 (ref 0.1–0.9)
MONOCYTES NFR BLD AUTO: 6.1 % (ref 5–12)
NEUTROPHILS NFR BLD AUTO: 4.28 10*3/MM3 (ref 1.7–7)
NEUTROPHILS NFR BLD AUTO: 62.5 % (ref 42.7–76)
NRBC BLD AUTO-RTO: 0 /100 WBC (ref 0–0.2)
PLATELET # BLD AUTO: 213 10*3/MM3 (ref 140–450)
PMV BLD AUTO: 10.5 FL (ref 6–12)
RBC # BLD AUTO: 4.29 10*6/MM3 (ref 3.77–5.28)
WBC NRBC COR # BLD: 6.85 10*3/MM3 (ref 3.4–10.8)

## 2023-08-23 PROCEDURE — 85025 COMPLETE CBC W/AUTO DIFF WBC: CPT | Performed by: STUDENT IN AN ORGANIZED HEALTH CARE EDUCATION/TRAINING PROGRAM

## 2023-08-23 PROCEDURE — 36415 COLL VENOUS BLD VENIPUNCTURE: CPT

## 2023-08-25 ENCOUNTER — TELEPHONE (OUTPATIENT)
Dept: FAMILY MEDICINE CLINIC | Facility: CLINIC | Age: 68
End: 2023-08-25
Payer: MEDICARE

## 2023-08-25 DIAGNOSIS — Z12.31 ENCOUNTER FOR SCREENING MAMMOGRAM FOR BREAST CANCER: Primary | ICD-10-CM

## 2023-08-25 NOTE — TELEPHONE ENCOUNTER
Caller: Kitty Berry    Relationship: Self    Best call back number: [phone]     What orders are you requesting (i.e. lab or imaging): MAMMOGRAM     In what timeframe would the patient need to come in: ASAP    Where will you receive your lab/imaging services: [facility]    Additional notes: PLEASE CALL TO LET PATIENT KNOW.

## 2023-08-25 NOTE — TELEPHONE ENCOUNTER
Caller: Kitty Berry    Relationship: Self    Best call back number: [phone]     What test was performed: PULMONARY FUNCTION TEST AND LABS     When was the test performed: 08.18.23 AND 08.23.23    Where was the test performed: [facility]    Additional notes: PLEASE CALL TO DISCUSS.

## 2023-08-28 NOTE — TELEPHONE ENCOUNTER
Caller: Kitty Berry    Relationship: Self    Best call back number:[phone]    What is the best time to reach you: ANYTIME    Who are you requesting to speak with (clinical staff, provider,  specific staff member): CLINICAL    What was the call regarding:PATIENT WAS CALLING IN TO CHECK ON THE STATUS OF THESE TESTS. PATIENT WOULD LIKE A CALL BACK AS SOON AS POSSIBLE.

## 2023-08-30 ENCOUNTER — HOSPITAL ENCOUNTER (OUTPATIENT)
Dept: MAMMOGRAPHY | Facility: HOSPITAL | Age: 68
Discharge: HOME | End: 2023-08-30
Admitting: STUDENT IN AN ORGANIZED HEALTH CARE EDUCATION/TRAINING PROGRAM
Payer: MEDICARE

## 2023-08-30 DIAGNOSIS — Z12.31 ENCOUNTER FOR SCREENING MAMMOGRAM FOR BREAST CANCER: ICD-10-CM

## 2023-08-30 PROCEDURE — 77063 BREAST TOMOSYNTHESIS BI: CPT

## 2023-08-30 PROCEDURE — 77067 SCR MAMMO BI INCL CAD: CPT

## 2023-08-31 RX ORDER — FAMOTIDINE 20 MG/1
20 TABLET, FILM COATED ORAL NIGHTLY
Qty: 30 TABLET | Refills: 3 | Status: SHIPPED | OUTPATIENT
Start: 2023-08-31

## 2023-09-01 DIAGNOSIS — E03.9 ACQUIRED HYPOTHYROIDISM: ICD-10-CM

## 2023-09-01 RX ORDER — LEVOTHYROXINE SODIUM 0.05 MG/1
TABLET ORAL
Qty: 90 TABLET | Refills: 0 | Status: SHIPPED | OUTPATIENT
Start: 2023-09-01

## 2023-09-07 ENCOUNTER — HOSPITAL ENCOUNTER (OUTPATIENT)
Dept: CT IMAGING | Facility: HOSPITAL | Age: 68
Discharge: HOME | End: 2023-09-07
Admitting: STUDENT IN AN ORGANIZED HEALTH CARE EDUCATION/TRAINING PROGRAM
Payer: MEDICARE

## 2023-09-07 DIAGNOSIS — J98.4 RESTRICTIVE AIRWAY DISEASE: ICD-10-CM

## 2023-09-07 PROCEDURE — 71250 CT THORAX DX C-: CPT

## 2023-09-13 ENCOUNTER — LAB (OUTPATIENT)
Dept: FAMILY MEDICINE CLINIC | Facility: CLINIC | Age: 68
End: 2023-09-13
Payer: MEDICARE

## 2023-09-13 ENCOUNTER — OFFICE VISIT (OUTPATIENT)
Dept: FAMILY MEDICINE CLINIC | Facility: CLINIC | Age: 68
End: 2023-09-13
Payer: MEDICARE

## 2023-09-13 VITALS
HEART RATE: 80 BPM | DIASTOLIC BLOOD PRESSURE: 78 MMHG | BODY MASS INDEX: 36.8 KG/M2 | RESPIRATION RATE: 18 BRPM | HEIGHT: 62 IN | WEIGHT: 200 LBS | OXYGEN SATURATION: 94 % | SYSTOLIC BLOOD PRESSURE: 126 MMHG

## 2023-09-13 DIAGNOSIS — D50.9 IRON DEFICIENCY ANEMIA, UNSPECIFIED IRON DEFICIENCY ANEMIA TYPE: ICD-10-CM

## 2023-09-13 DIAGNOSIS — J98.4 RESTRICTIVE LUNG DISEASE: Primary | ICD-10-CM

## 2023-09-13 DIAGNOSIS — I51.7 CARDIOMEGALY: ICD-10-CM

## 2023-09-13 DIAGNOSIS — R06.09 DYSPNEA ON EXERTION: ICD-10-CM

## 2023-09-13 DIAGNOSIS — K21.9 GASTROESOPHAGEAL REFLUX DISEASE WITHOUT ESOPHAGITIS: ICD-10-CM

## 2023-09-13 DIAGNOSIS — J30.1 SEASONAL ALLERGIC RHINITIS DUE TO POLLEN: ICD-10-CM

## 2023-09-13 DIAGNOSIS — F10.90 ALCOHOL USE DISORDER: ICD-10-CM

## 2023-09-13 PROBLEM — Z01.812 PRE-PROCEDURE LAB EXAM: Status: RESOLVED | Noted: 2021-12-04 | Resolved: 2023-09-13

## 2023-09-13 PROBLEM — J45.901 REACTIVE AIRWAY DISEASE WITH ACUTE EXACERBATION: Status: RESOLVED | Noted: 2021-11-10 | Resolved: 2023-09-13

## 2023-09-13 PROBLEM — J12.82 PNEUMONIA DUE TO COVID-19 VIRUS: Status: RESOLVED | Noted: 2021-01-30 | Resolved: 2023-09-13

## 2023-09-13 PROBLEM — Z00.00 MEDICARE ANNUAL WELLNESS VISIT, INITIAL: Status: RESOLVED | Noted: 2020-09-01 | Resolved: 2023-09-13

## 2023-09-13 PROBLEM — J96.01 ACUTE HYPOXEMIC RESPIRATORY FAILURE DUE TO COVID-19: Status: RESOLVED | Noted: 2021-02-06 | Resolved: 2023-09-13

## 2023-09-13 PROBLEM — Z80.0 FAMILY HISTORY OF MALIGNANT NEOPLASM OF COLON: Status: RESOLVED | Noted: 2019-08-28 | Resolved: 2023-09-13

## 2023-09-13 PROBLEM — M47.816 LUMBAR SPONDYLOSIS: Status: ACTIVE | Noted: 2023-08-04

## 2023-09-13 PROBLEM — C50.919 BREAST CANCER: Status: RESOLVED | Noted: 2019-08-28 | Resolved: 2023-09-13

## 2023-09-13 PROBLEM — K52.9 GASTROENTERITIS: Status: RESOLVED | Noted: 2022-04-15 | Resolved: 2023-09-13

## 2023-09-13 PROBLEM — M51.36 DEGENERATION OF LUMBAR INTERVERTEBRAL DISC: Status: ACTIVE | Noted: 2023-08-04

## 2023-09-13 PROBLEM — H11.441 CONJUNCTIVAL CYST OF RIGHT EYE: Status: RESOLVED | Noted: 2022-01-18 | Resolved: 2023-09-13

## 2023-09-13 PROBLEM — M51.369 DEGENERATION OF LUMBAR INTERVERTEBRAL DISC: Status: ACTIVE | Noted: 2023-08-04

## 2023-09-13 PROBLEM — U07.1 ACUTE HYPOXEMIC RESPIRATORY FAILURE DUE TO COVID-19: Status: RESOLVED | Noted: 2021-02-06 | Resolved: 2023-09-13

## 2023-09-13 PROBLEM — H43.819 POSTERIOR VITREOUS DETACHMENT: Status: RESOLVED | Noted: 2018-12-03 | Resolved: 2023-09-13

## 2023-09-13 PROBLEM — U07.1 PNEUMONIA DUE TO COVID-19 VIRUS: Status: RESOLVED | Noted: 2021-01-30 | Resolved: 2023-09-13

## 2023-09-13 PROBLEM — H15.101 EPISCLERITIS OF RIGHT EYE: Status: RESOLVED | Noted: 2021-12-29 | Resolved: 2023-09-13

## 2023-09-13 PROBLEM — M54.16 LUMBAR RADICULOPATHY: Status: ACTIVE | Noted: 2023-08-04

## 2023-09-13 PROBLEM — U07.1 COVID-19 VIRUS INFECTION: Status: RESOLVED | Noted: 2020-12-23 | Resolved: 2023-09-13

## 2023-09-13 PROBLEM — R91.8 LUNG MASS: Status: RESOLVED | Noted: 2023-05-28 | Resolved: 2023-09-13

## 2023-09-13 PROBLEM — M70.62 TROCHANTERIC BURSITIS OF LEFT HIP: Status: RESOLVED | Noted: 2023-05-28 | Resolved: 2023-09-13

## 2023-09-13 PROCEDURE — 82728 ASSAY OF FERRITIN: CPT | Performed by: STUDENT IN AN ORGANIZED HEALTH CARE EDUCATION/TRAINING PROGRAM

## 2023-09-13 PROCEDURE — 83540 ASSAY OF IRON: CPT | Performed by: STUDENT IN AN ORGANIZED HEALTH CARE EDUCATION/TRAINING PROGRAM

## 2023-09-13 PROCEDURE — 85045 AUTOMATED RETICULOCYTE COUNT: CPT | Performed by: STUDENT IN AN ORGANIZED HEALTH CARE EDUCATION/TRAINING PROGRAM

## 2023-09-13 PROCEDURE — 36415 COLL VENOUS BLD VENIPUNCTURE: CPT

## 2023-09-13 PROCEDURE — 84466 ASSAY OF TRANSFERRIN: CPT | Performed by: STUDENT IN AN ORGANIZED HEALTH CARE EDUCATION/TRAINING PROGRAM

## 2023-09-13 PROCEDURE — 85025 COMPLETE CBC W/AUTO DIFF WBC: CPT | Performed by: STUDENT IN AN ORGANIZED HEALTH CARE EDUCATION/TRAINING PROGRAM

## 2023-09-13 RX ORDER — FEXOFENADINE HCL 180 MG/1
180 TABLET ORAL DAILY
Qty: 30 TABLET | Refills: 3 | Status: SHIPPED | OUTPATIENT
Start: 2023-09-13

## 2023-09-13 RX ORDER — FLUTICASONE PROPIONATE 50 MCG
2 SPRAY, SUSPENSION (ML) NASAL DAILY
Qty: 16 G | Refills: 3 | Status: SHIPPED | OUTPATIENT
Start: 2023-09-13

## 2023-09-13 NOTE — PROGRESS NOTES
[facility]   Established Patient Visit    Patient Name:Kitty Berry  : 1955  MRN: [id number]  Primary Care Physician: Cici Martinez MD    Subjective    SUBJECTIVE     Chief Complaint  Chief Complaint   Patient presents with    [facility] [place]    Follow-up     test       History of Present Illness  Kitty Berry is a 67 y.o. female who presents to clinic for follow up of chronic conditions. She was previously a patient of Dr. cMclure and is here to establish with me as her new PCP. Specific concerns today include difficulty breathing with exertion, reflux, and anemia.     Dyspnea on Exertion  Chronic and worsening over the past several years.  States she is short of breath if she walks long distances or does activities with a lot of exertion.  Denies any issues with breathing when just walking short distances.  States this has been progressive.  She was diagnosed with asthma and has been on Breztri and albuterol as needed.  Does not feel that the Breztri helped significantly, but does feel that the albuterol helps sometimes.  Denies shortness of breath at rest, orthopnea, lower extremity edema.  She recently had lung function test showing restrictive lung disease.  After we discussed the results over the phone patient had high-res CT for further evaluation and patient wants to discuss next steps today.     GERD  GERD was uncontrolled at last visit, omeprazole was doubled to 80 mg for short period which did help symptoms significantly.  Patient called the office requesting instruction on what to do after resuming original omeprazole dose.  During our phone conversation I recommended she remain on omeprazole 40 mg daily in the morning and add famotidine 20 mg at night.  She has been doing this for the past week and states that it has significantly helped her symptoms.  She noticed that before adding Pepcid she was having a random cough when laying on her left side or  "sometimes randomly when getting up from bed but that this cough has improved since starting famotidine.  She does report daily alcohol use and states she knows that this can make her reflux worse.  States she drinks 3 to 4 glasses of bourbon (shot of bourbon) every night.  She denies bloody stools, melena, hematemesis, abdominal pain.    JUD  Recent labs checked showing iron deficiency anemia, she has been taking an oral iron supplement on Mondays Wednesdays and Fridays.  Denies fatigue.  Denies any evidence of blood loss.    New cough, postnasal drip  The past few days has noticed a new type of cough that sounds more congested in her chest but she does not really feel congested in her chest.  Has noticed some drainage in her throat and a slightly sore throat.  Curious if she should just take Claritin or if something else is needed.      Review of Systems  Pertinent symptoms noted in HPI, otherwise a brief disease specific review of symptoms was negative.       Healthcare Maintenance   Due for annual physical in December      Patient History   The following portions of the patient's history were reviewed and updated as appropriate:   allergies, current medications, problem list, PMHx, PSHx, PFHx, past social history     Objective   OBJECTIVE     Vitals:    09/13/23 1059   BP: 126/78   BP Location: Left arm   Pulse: 80   Resp: 18   SpO2: 94%   Weight: 90.7 kg (200 lb)   Height: 157.5 cm (62.01\")      BMI Readings from Last 3 Encounters:   09/13/23 36.57 kg/m²   08/02/23 36.57 kg/m²   05/17/23 37.60 kg/m²        Physical Exam   Constitutional: Alert, well-appearing, no acute distress  HENT: NCAT, mucous membranes moist, bilateral TMs normal, posterior pharynx with mild cobblestoning  Neck: Supple  Respiratory: No respiratory distress, adequate air entry but slightly diminished in the bases with slight intermittent inspiratory crackles appreciated in upper lung fields  Cardiovascular: RRR, no LE edema  Psychiatric: " Appropriate mood and affect, cooperative  Skin: No apparent rashes or lesions      Data/Results/Imaging   {Result Review :  I personally reviewed and interpreted the following results and agree with the formal assessment:    - PFTs from 8/18/23 showing overall stable restrictive disease  - High Res CT Chest showing new nodular changes in the right upper lobe with evidence of fibrosis  - CBC from 8-23 and 8/23/2023 showing overall stable anemia     Assessment    ASSESSMENT & PLAN       Problem List Items Addressed This Visit     Restrictive lung disease, potential interstitial lung disease with dyspnea on exertion       undiagnosed new problem with uncertain prognosis    Assessment   Formerly diagnosed with asthma/COPD/emphysema and has been on Breztri inhaler and albuterol as needed; however, on chart review prior imaging and lung function test seem to support restrictive disease versus obstructive disease.  Patient has history of COVID in 2020 and also had radiation for breast cancer, both of which have the potential to cause subsequent fibrosis/ILD.  This also explains why her respiratory status has progressively, gradually declined.  She is currently stable on room air and reports that her main issue is dyspnea with exertion.  Patient is obese which is also likely contributing to her overall presentation however this does not preclude the evidence of underlying lung disease seen on imaging and PFTs.          PLAN:    Will refer to pulmonology for further evaluation and management given concern for ILD.  Discussed option of discontinuing Breztri as I do not believe this is providing significant benefit given my assessment of the underlying etiology and pathophysiology of her dyspnea.  If she discontinues Breztri without acute worsening in breathing this will support lack of benefit of medication which will be further evidence prior to her seeing pulmonology.  Okay to continue albuterol as needed if she feels that  it helps.  Reiterated importance of healthy weight on respiratory status and overall health however current exercise tolerance is limited by respiratory status.       Cardiomegaly in setting of dyspnea on exertion and prior chest radiation       undiagnosed new problem with uncertain prognosis    Assessment   Cardiomegaly seen on imaging.  Given current symptoms with dyspnea on exertion and history of chest radiation further evaluation of cardiac function is appropriate.  No evidence of acute or chronic heart failure exacerbation on today's evaluation.           PLAN:    Echo ordered for further evaluation.  Further management pending these results.       GERD; Alcohol Use Disorder       chronic, suboptimal control but improving    Assessment   Previously uncontrolled however after addition of famotidine symptoms are significantly improving.  No red flag features.           PLAN:    Sinew omeprazole 40 mg in the morning and famotidine 20 mg in the evening.  Reiterated importance of lifestyle changes including dietary changes to improve reflux.      Briefly discussed appropriate alcohol use and health risks of excessive alcohol use. Recommended to cut back to less than 7 standard alcoholic drinks per week. Will further assessment severity of alcohol use disorder and continue to  at next visit.        Iron deficiency anemia       chronic, control uncertain, pending results from today's evaluation    PLAN:    Continue over-the-counter iron supplementation on Monday, Wednesday, and Fridays.  Repeat labs obtained today including CBC, iron studies, ferritin, reticulocyte count to assess response to oral iron supplementation.       Seasonal allergic rhinitis       acute, uncomplicated        PLAN:    Recommended to start Flonase as this is first-line treatment.  We will also send prescription for fexofenadine 180 mg daily.       In addition to the above, I also performed the following during today's visit:  [x]  Educated patient and/or family on overall impression and recommended plan of care  [x] Encouraged and provided opportunity for patient and/or family to voice questions, and all questions were answered  [x] Reviewed appropriate use of current medications and reiterated important side effects to be aware of  [x] Educated patient and/or family on all new medications that were started including indication, proper administration and dosing, and side effects to monitor for  [x] Reiterated the importance of a healthy diet, regular physical activity, and weight loss on all chronic conditions and overall health      Orders Placed This Encounter   Procedures    CT Chest Hi Resolution    Iron and TIBC    Ferritin    Reticulocytes    Ambulatory Referral to Pulmonology    Adult Transthoracic Echo Complete W/ Cont if Necessary Per Protocol    CBC w AUTO Differential      Follow Up   Follow up in 3-4 months for annual physical with [facility].     An After Visit Summary was printed and given to the patient.        Cici Martinez MD

## 2023-09-14 LAB
BASOPHILS # BLD AUTO: 0.04 10*3/MM3 (ref 0–0.2)
BASOPHILS NFR BLD AUTO: 0.5 % (ref 0–1.5)
DEPRECATED RDW RBC AUTO: 45.9 FL (ref 37–54)
EOSINOPHIL # BLD AUTO: 0.18 10*3/MM3 (ref 0–0.4)
EOSINOPHIL NFR BLD AUTO: 2.3 % (ref 0.3–6.2)
ERYTHROCYTE [DISTWIDTH] IN BLOOD BY AUTOMATED COUNT: 15.7 % (ref 12.3–15.4)
FERRITIN SERPL-MCNC: 40 NG/ML (ref 13–150)
HCT VFR BLD AUTO: 35.7 % (ref 34–46.6)
HGB BLD-MCNC: 11.5 G/DL (ref 12–15.9)
IMM GRANULOCYTES # BLD AUTO: 0.03 10*3/MM3 (ref 0–0.05)
IMM GRANULOCYTES NFR BLD AUTO: 0.4 % (ref 0–0.5)
IRON 24H UR-MRATE: 157 MCG/DL (ref 37–145)
IRON SATN MFR SERPL: 29 % (ref 20–50)
LYMPHOCYTES # BLD AUTO: 1.7 10*3/MM3 (ref 0.7–3.1)
LYMPHOCYTES NFR BLD AUTO: 21.5 % (ref 19.6–45.3)
MCH RBC QN AUTO: 26.2 PG (ref 26.6–33)
MCHC RBC AUTO-ENTMCNC: 32.2 G/DL (ref 31.5–35.7)
MCV RBC AUTO: 81.3 FL (ref 79–97)
MONOCYTES # BLD AUTO: 0.68 10*3/MM3 (ref 0.1–0.9)
MONOCYTES NFR BLD AUTO: 8.6 % (ref 5–12)
NEUTROPHILS NFR BLD AUTO: 5.28 10*3/MM3 (ref 1.7–7)
NEUTROPHILS NFR BLD AUTO: 66.7 % (ref 42.7–76)
NRBC BLD AUTO-RTO: 0 /100 WBC (ref 0–0.2)
PLATELET # BLD AUTO: 235 10*3/MM3 (ref 140–450)
PMV BLD AUTO: 10.7 FL (ref 6–12)
RBC # BLD AUTO: 4.39 10*6/MM3 (ref 3.77–5.28)
RETICS # AUTO: 0.07 10*6/MM3 (ref 0.02–0.13)
RETICS/RBC NFR AUTO: 1.64 % (ref 0.7–1.9)
TIBC SERPL-MCNC: 538 MCG/DL (ref 298–536)
TRANSFERRIN SERPL-MCNC: 361 MG/DL (ref 200–360)
WBC NRBC COR # BLD: 7.91 10*3/MM3 (ref 3.4–10.8)

## 2023-09-18 ENCOUNTER — TELEPHONE (OUTPATIENT)
Dept: FAMILY MEDICINE CLINIC | Facility: CLINIC | Age: 68
End: 2023-09-18
Payer: MEDICARE

## 2023-09-18 NOTE — TELEPHONE ENCOUNTER
Caller: Kitty Berry    Relationship: Self    Best call back number: [phone]     What was the call regarding: PATIENT IS CHECKING THE STATUS OF REFERRAL TO PULMONOLOGY. PLEASE CALL.

## 2023-09-25 DIAGNOSIS — F41.9 ANXIETY: ICD-10-CM

## 2023-09-26 RX ORDER — ALPRAZOLAM 0.5 MG/1
TABLET ORAL
Qty: 60 TABLET | Refills: 2 | Status: SHIPPED | OUTPATIENT
Start: 2023-09-26

## 2023-09-26 RX ORDER — AMLODIPINE BESYLATE 5 MG/1
TABLET ORAL
Qty: 90 TABLET | Refills: 1 | Status: SHIPPED | OUTPATIENT
Start: 2023-09-26

## 2023-12-12 RX ORDER — OMEPRAZOLE 40 MG/1
40 CAPSULE, DELAYED RELEASE ORAL DAILY
Qty: 90 CAPSULE | Refills: 0 | Status: SHIPPED | OUTPATIENT
Start: 2023-12-12

## 2023-12-18 DIAGNOSIS — E03.9 ACQUIRED HYPOTHYROIDISM: ICD-10-CM

## 2023-12-19 DIAGNOSIS — E03.9 ACQUIRED HYPOTHYROIDISM: ICD-10-CM

## 2023-12-19 RX ORDER — LEVOTHYROXINE SODIUM 0.05 MG/1
TABLET ORAL
Qty: 90 TABLET | Refills: 0 | Status: SHIPPED | OUTPATIENT
Start: 2023-12-19

## 2023-12-19 RX ORDER — LEVOTHYROXINE SODIUM 0.05 MG/1
TABLET ORAL
Qty: 90 TABLET | Refills: 0 | OUTPATIENT
Start: 2023-12-19

## 2023-12-29 ENCOUNTER — OFFICE VISIT (OUTPATIENT)
Dept: FAMILY MEDICINE CLINIC | Facility: CLINIC | Age: 68
End: 2023-12-29
Payer: MEDICARE

## 2023-12-29 ENCOUNTER — LAB (OUTPATIENT)
Dept: FAMILY MEDICINE CLINIC | Facility: CLINIC | Age: 68
End: 2023-12-29
Payer: MEDICARE

## 2023-12-29 VITALS
WEIGHT: 210.6 LBS | BODY MASS INDEX: 38.76 KG/M2 | RESPIRATION RATE: 16 BRPM | HEART RATE: 88 BPM | DIASTOLIC BLOOD PRESSURE: 80 MMHG | OXYGEN SATURATION: 96 % | HEIGHT: 62 IN | SYSTOLIC BLOOD PRESSURE: 128 MMHG

## 2023-12-29 DIAGNOSIS — E03.9 ACQUIRED HYPOTHYROIDISM: ICD-10-CM

## 2023-12-29 DIAGNOSIS — K21.9 GASTROESOPHAGEAL REFLUX DISEASE WITHOUT ESOPHAGITIS: ICD-10-CM

## 2023-12-29 DIAGNOSIS — Z00.00 ENCOUNTER FOR ANNUAL WELLNESS VISIT (AWV) IN MEDICARE PATIENT: ICD-10-CM

## 2023-12-29 DIAGNOSIS — I10 PRIMARY HYPERTENSION: ICD-10-CM

## 2023-12-29 DIAGNOSIS — D50.9 IRON DEFICIENCY ANEMIA, UNSPECIFIED IRON DEFICIENCY ANEMIA TYPE: ICD-10-CM

## 2023-12-29 DIAGNOSIS — Z00.00 ENCOUNTER FOR ANNUAL WELLNESS VISIT (AWV) IN MEDICARE PATIENT: Primary | ICD-10-CM

## 2023-12-29 DIAGNOSIS — E55.9 VITAMIN D DEFICIENCY: ICD-10-CM

## 2023-12-29 PROCEDURE — 82306 VITAMIN D 25 HYDROXY: CPT | Performed by: STUDENT IN AN ORGANIZED HEALTH CARE EDUCATION/TRAINING PROGRAM

## 2023-12-29 PROCEDURE — 86803 HEPATITIS C AB TEST: CPT | Performed by: STUDENT IN AN ORGANIZED HEALTH CARE EDUCATION/TRAINING PROGRAM

## 2023-12-29 PROCEDURE — 80053 COMPREHEN METABOLIC PANEL: CPT | Performed by: STUDENT IN AN ORGANIZED HEALTH CARE EDUCATION/TRAINING PROGRAM

## 2023-12-29 PROCEDURE — 83540 ASSAY OF IRON: CPT | Performed by: STUDENT IN AN ORGANIZED HEALTH CARE EDUCATION/TRAINING PROGRAM

## 2023-12-29 PROCEDURE — 82728 ASSAY OF FERRITIN: CPT | Performed by: STUDENT IN AN ORGANIZED HEALTH CARE EDUCATION/TRAINING PROGRAM

## 2023-12-29 PROCEDURE — 83036 HEMOGLOBIN GLYCOSYLATED A1C: CPT | Performed by: STUDENT IN AN ORGANIZED HEALTH CARE EDUCATION/TRAINING PROGRAM

## 2023-12-29 PROCEDURE — 85025 COMPLETE CBC W/AUTO DIFF WBC: CPT | Performed by: STUDENT IN AN ORGANIZED HEALTH CARE EDUCATION/TRAINING PROGRAM

## 2023-12-29 PROCEDURE — 36415 COLL VENOUS BLD VENIPUNCTURE: CPT

## 2023-12-29 PROCEDURE — 84466 ASSAY OF TRANSFERRIN: CPT | Performed by: STUDENT IN AN ORGANIZED HEALTH CARE EDUCATION/TRAINING PROGRAM

## 2023-12-29 PROCEDURE — 80061 LIPID PANEL: CPT | Performed by: STUDENT IN AN ORGANIZED HEALTH CARE EDUCATION/TRAINING PROGRAM

## 2023-12-29 RX ORDER — GABAPENTIN 300 MG/1
300 CAPSULE ORAL NIGHTLY
Qty: 90 CAPSULE | Refills: 1 | Status: SHIPPED | OUTPATIENT
Start: 2023-12-29

## 2023-12-29 RX ORDER — OMEPRAZOLE 40 MG/1
40 CAPSULE, DELAYED RELEASE ORAL DAILY
Qty: 90 CAPSULE | Refills: 2 | Status: SHIPPED | OUTPATIENT
Start: 2024-02-01

## 2023-12-29 RX ORDER — FAMOTIDINE 20 MG/1
20 TABLET, FILM COATED ORAL NIGHTLY
Qty: 90 TABLET | Refills: 2 | Status: SHIPPED | OUTPATIENT
Start: 2023-12-29

## 2023-12-29 RX ORDER — GABAPENTIN 300 MG/1
CAPSULE ORAL
COMMUNITY
Start: 2023-12-05 | End: 2023-12-29 | Stop reason: SDUPTHER

## 2023-12-29 RX ORDER — FAMOTIDINE 20 MG/1
20 TABLET, FILM COATED ORAL NIGHTLY
Qty: 30 TABLET | Refills: 2 | Status: SHIPPED | OUTPATIENT
Start: 2023-12-29 | End: 2023-12-29 | Stop reason: SDUPTHER

## 2023-12-29 RX ORDER — LEVOTHYROXINE SODIUM 0.05 MG/1
50 TABLET ORAL DAILY
Qty: 90 TABLET | Refills: 2 | Status: SHIPPED | OUTPATIENT
Start: 2024-02-15

## 2023-12-29 RX ORDER — MELOXICAM 15 MG/1
15 TABLET ORAL DAILY
Qty: 90 TABLET | Refills: 2 | Status: SHIPPED | OUTPATIENT
Start: 2023-12-29

## 2023-12-29 NOTE — PROGRESS NOTES
"The ABCs of the Annual Wellness Visit  Subsequent Medicare Wellness Visit    Subjective    Kitty Berry is a 68 y.o. female who presents for a Subsequent Medicare Wellness Visit.    The following portions of the patient's history were reviewed and   updated as appropriate: {history reviewed:20406::\"allergies\",\"current medications\",\"past family history\",\"past medical history\",\"past social history\",\"past surgical history\",\"problem list\"}.    Compared to one year ago, the patient feels her physical   health is worse.    Compared to one year ago, the patient feels her mental   health is the same.    Recent Hospitalizations:  She was not admitted to the hospital during the last year.       Current Medical Providers:  Patient Care Team:  Cici Martinez MD as PCP - General    Outpatient Medications Prior to Visit   Medication Sig Dispense Refill   • ALPRAZolam (XANAX) 0.5 MG tablet TAKE 2 TABLETS BY MOUTH TWICE DAILY 60 tablet 2   • amLODIPine (NORVASC) 5 MG tablet TAKE 1 TABLET BY MOUTH EVERY DAY 90 tablet 1   • famotidine (PEPCID) 20 MG tablet TAKE 1 TABLET BY MOUTH EVERY NIGHT. 30 tablet 2   • fexofenadine (ALLEGRA) 180 MG tablet Take 1 tablet by mouth Daily. 30 tablet 3   • fluticasone (FLONASE) 50 MCG/ACT nasal spray 2 sprays into the nostril(s) as directed by provider Daily. 16 g 3   • gabapentin (NEURONTIN) 300 MG capsule      • levothyroxine (SYNTHROID, LEVOTHROID) 50 MCG tablet TAKE 1 TABLET BY MOUTH EVERY DAY 90 tablet 0   • meloxicam (MOBIC) 15 MG tablet Take 1 tablet by mouth Daily.     • Multiple Vitamins-Minerals (MULTIVITAMIN WITH MINERALS) tablet tablet Take 1 tablet by mouth Daily.     • Omega-3 Fatty Acids (FISH OIL) 1000 MG capsule capsule Take 1 capsule by mouth Daily With Breakfast.     • omeprazole (priLOSEC) 40 MG capsule TAKE 1 CAPSULE BY MOUTH DAILY. 90 capsule 0   • Vitamin D, Cholecalciferol, 25 MCG (1000 UT) tablet Take 1 tablet by mouth Daily.     • albuterol sulfate  (90 " "Base) MCG/ACT inhaler INHALE 2 PUFFS EVERY FOUR HOURS AS NEEDED FOR WHEEZING 8.5 g 3   • Budeson-Glycopyrrol-Formoterol (Breztri Aerosphere) 160-9-4.8 MCG/ACT aerosol inhaler Inhale 2 puffs 2 (Two) Times a Day. 10.7 g 5     No facility-administered medications prior to visit.       No opioid medication identified on active medication list. I have reviewed chart for other potential  high risk medication/s and harmful drug interactions in the elderly.        Aspirin is not on active medication list.  Aspirin use is not indicated based on review of current medical condition/s. Risk of harm outweighs potential benefits.  .    Patient Active Problem List   Diagnosis   • Anxiety   • Encounter for general adult medical examination without abnormal findings   • Gastroesophageal reflux disease   • Hypertension   • Osteoarthritis   • DDD (degenerative disc disease), lumbosacral   • Acquired hypothyroidism   • Obesity   • Alcohol abuse   • Postmenopause   • Presbyopia   • Tear film insufficiency   • Vitreous degeneration   • Lumbar radiculopathy   • Lumbar spondylosis   • Degeneration of lumbar intervertebral disc   • Seasonal allergic rhinitis due to pollen   • Iron deficiency anemia   • Cardiomegaly   • Dyspnea on exertion   • Restrictive lung disease   • Alcohol use disorder     Advance Care Planning   Advance Care Planning     Advance Directive is not on file.  ACP discussion was held with the patient during this visit. Patient has an advance directive (not in EMR), copy requested.     Objective    There were no vitals filed for this visit.  Estimated body mass index is 36.57 kg/m² as calculated from the following:    Height as of 9/13/23: 157.5 cm (62.01\").    Weight as of 9/13/23: 90.7 kg (200 lb).           Does the patient have evidence of cognitive impairment? {Yes/No:68973}          HEALTH RISK ASSESSMENT    Smoking Status:  Social History     Tobacco Use   Smoking Status Former   • Packs/day: 1.00   • Years: 5.00 "   • Additional pack years: 0.00   • Total pack years: 5.00   • Types: Cigarettes   • Quit date: 1999   • Years since quittin.0   • Passive exposure: Past   Smokeless Tobacco Never     Alcohol Consumption:  Social History     Substance and Sexual Activity   Alcohol Use Yes     Fall Risk Screen:    ELIEL Fall Risk Assessment was completed, and patient is at LOW risk for falls.Assessment completed on:2023    Depression Screenin/2/2023     9:28 AM   PHQ-2/PHQ-9 Depression Screening   Little Interest or Pleasure in Doing Things 0-->not at all   Feeling Down, Depressed or Hopeless 0-->not at all   PHQ-9: Brief Depression Severity Measure Score 0       Health Habits and Functional and Cognitive Screenin/2/2022     1:31 PM   Functional & Cognitive Status   Do you have difficulty preparing food and eating? No   Do you have difficulty bathing yourself, getting dressed or grooming yourself? No   Do you have difficulty using the toilet? No   Do you have difficulty moving around from place to place? No   Do you have trouble with steps or getting out of a bed or a chair? No   Current Diet Well Balanced Diet   Dental Exam Up to date   Eye Exam Up to date        Exercise Frequency Comment occ   Current Exercises Include No Regular Exercise   Do you need help using the phone?  No   Are you deaf or do you have serious difficulty hearing?  No   Do you need help to go to places out of walking distance? No   Do you need help shopping? No   Do you need help preparing meals?  No   Do you need help with housework?  No   Do you need help with laundry? No   Do you need help taking your medications? No   Do you need help managing money? No   Do you ever drive or ride in a car without wearing a seat belt? No   Have you felt unusual stress, anger or loneliness in the last month? No   Who do you live with? Spouse   If you need help, do you have trouble finding someone available to you? No   Have you been bothered  in the last four weeks by sexual problems? No   Do you have difficulty concentrating, remembering or making decisions? No       Age-appropriate Screening Schedule:  Refer to the list below for future screening recommendations based on patient's age, sex and/or medical conditions. Orders for these recommended tests are listed in the plan section. The patient has been provided with a written plan.    Health Maintenance   Topic Date Due   • TDAP/TD VACCINES (1 - Tdap) Never done   • ZOSTER VACCINE (1 of 2) Never done   • HEPATITIS C SCREENING  Never done   • INFLUENZA VACCINE  08/01/2023   • COVID-19 Vaccine (5 - 2023-24 season) 09/01/2023   • ANNUAL WELLNESS VISIT  12/02/2023   • BMI FOLLOWUP  05/17/2024   • MAMMOGRAM  08/30/2024   • DXA SCAN  12/30/2024   • COLORECTAL CANCER SCREENING  06/03/2025   • Pneumococcal Vaccine 65+  Completed                  CMS Preventative Services Quick Reference  Risk Factors Identified During Encounter  {Medicare Wellness Risk Factors:01511}  The above risks/problems have been discussed with the patient.  Pertinent information has been shared with the patient in the After Visit Summary.  An After Visit Summary and PPPS were made available to the patient.    Follow Up:   Next Medicare Wellness visit to be scheduled in 1 year.   {Wrapup  Review (Popup)  Advance Care Planning  Labs  CC  Problem List  Visit Diagnosis  Medications  Result Review  Imaging  [facility] Maintenance  Quality  BestPractice  [facility]  SnapShot  Encounters  Notes  Media  Procedures :23}    Additional E&M Note during same encounter follows:  Patient has multiple medical problems which are significant and separately identifiable that require additional work above and beyond the Medicare Wellness Visit.      Chief Complaint  Annual Exam (MWV)    Subjective    {Problem List  Visit Diagnosis   Encounters  Notes  Medications  Labs  Result Review Imaging  Media :23}    FREEDOM Berry is also  being seen today for ***         Objective   Vital Signs:  There were no vitals taken for this visit.    Physical Exam     {The following data was reviewed by (Optional):02318}  {Ambulatory Labs (Optional):38662}  {Data reviewed (Optional):14166:::1}           Assessment and Plan {CC Problem List  Visit Diagnosis   ROS  Review (Popup)  Health Maintenance  Quality  BestPractice  Medications  SmartSets  SnapShot Encounters  Media :23}  There are no diagnoses linked to this encounter.       {Time Spent (Optional):98946}  Follow Up {Instructions Charge Capture  Follow-up Communications :23}  No follow-ups on file.  Patient was given instructions and counseling regarding her condition or for health maintenance advice. Please see specific information pulled into the AVS if appropriate.

## 2023-12-29 NOTE — PROGRESS NOTES
[facility]   The ABCs of the Annual Wellness Visit  Subsequent Medicare Wellness Visit  2023    Patient Name: Kitty Berry   YOB: 1955   Medical Record Number: [id number]   Primary Care Physician: Cici Martinez MD     Subjective     Chief Complaint     Chief Complaint   Patient presents with    Annual Exam     MWV       History of Present Illness   Kitty Berry is a 68 y.o. female who presents for a subsequent Annual Medicare Wellness Visit.     The following portions of the patient's history were reviewed and updated as appropriate:   allergies, current medications, past family history, past medical history, past social history, past surgical history, problem list    Medical Assessment     Compared to one year ago, the patient feels her physical health is worse.  Compared to one year ago, the patient feels her mental health is the same.    Recent Hospitalizations:  She was not admitted to the hospital during the last year.     Current Medical Providers:  Patient Care Team:  Cici Martinez MD as PCP - General    The [facility] providers who are involved in the care of this patient are listed above.   Additional providers and suppliers are listed below:    Health Risk Assessment       Smoking Status:     Social History     Tobacco Use   Smoking Status Former    Packs/day: 1.00    Years: 5.00    Additional pack years: 0.00    Total pack years: 5.00    Types: Cigarettes    Quit date: 1999    Years since quittin.0    Passive exposure: Past   Smokeless Tobacco Never       Alcohol Consumption:     Social History     Substance and Sexual Activity   Alcohol Use Yes       Fall Risk Screen:   STEADI Fall Risk Assessment has not been completed.     Depression Screenin/2/2023     9:28 AM   PHQ-2/PHQ-9 Depression Screening   Little Interest or Pleasure in Doing Things 0-->not at all   Feeling Down, Depressed or Hopeless 0-->not  at all   PHQ-9: Brief Depression Severity Measure Score 0        Health Habits and Functional Cognitive Screenin/29/2023     1:13 PM   Functional & Cognitive Status   Do you have difficulty preparing food and eating? No   Do you have difficulty bathing yourself, getting dressed or grooming yourself? No   Do you have difficulty using the toilet? No   Do you have difficulty moving around from place to place? No   Do you have trouble with steps or getting out of a bed or a chair? No   Current Diet Well Balanced Diet   Dental Exam Up to date   Eye Exam Up to date   Exercise (times per week) 3 times per week   Current Exercises Include Other        Exercise Comment physical therapy   Do you need help using the phone?  No   Are you deaf or do you have serious difficulty hearing?  No   Do you need help to go to places out of walking distance? No   Do you need help shopping? No   Do you need help preparing meals?  No   Do you need help with housework?  No   Do you need help with laundry? No   Do you need help taking your medications? No   Do you need help managing money? No   Do you ever drive or ride in a car without wearing a seat belt? No   Have you felt unusual stress, anger or loneliness in the last month? No   Who do you live with? Spouse   If you need help, do you have trouble finding someone available to you? No   Do you have difficulty concentrating, remembering or making decisions? No        Age-appropriate Screening Schedule:   Refer to the list below for future screening recommendations based on patient's age, sex and/or medical conditions. Orders for these recommended tests are listed in the plan section. The patient has been provided with a written plan.    Health Maintenance   Topic Date Due    TDAP/TD VACCINES (1 - Tdap) Never done    ZOSTER VACCINE (1 of 2) Never done    ANNUAL WELLNESS VISIT  2023    BMI FOLLOWUP  2024    MAMMOGRAM  2024    DXA SCAN  2024    COLORECTAL  CANCER SCREENING  06/03/2025    HEPATITIS C SCREENING  Completed    COVID-19 Vaccine  Completed    INFLUENZA VACCINE  Completed    Pneumococcal Vaccine 65+  Completed            Medications     Outpatient Medications Prior to Visit   Medication Sig Dispense Refill    ALPRAZolam (XANAX) 0.5 MG tablet TAKE 2 TABLETS BY MOUTH TWICE DAILY 60 tablet 2    amLODIPine (NORVASC) 5 MG tablet TAKE 1 TABLET BY MOUTH EVERY DAY 90 tablet 1    fexofenadine (ALLEGRA) 180 MG tablet Take 1 tablet by mouth Daily. 30 tablet 3    fluticasone (FLONASE) 50 MCG/ACT nasal spray 2 sprays into the nostril(s) as directed by provider Daily. 16 g 3    Multiple Vitamins-Minerals (MULTIVITAMIN WITH MINERALS) tablet tablet Take 1 tablet by mouth Daily.      Omega-3 Fatty Acids (FISH OIL) 1000 MG capsule capsule Take 1 capsule by mouth Daily With Breakfast.      Vitamin D, Cholecalciferol, 25 MCG (1000 UT) tablet Take 1 tablet by mouth Daily.      famotidine (PEPCID) 20 MG tablet TAKE 1 TABLET BY MOUTH EVERY NIGHT. 30 tablet 2    gabapentin (NEURONTIN) 300 MG capsule       levothyroxine (SYNTHROID, LEVOTHROID) 50 MCG tablet TAKE 1 TABLET BY MOUTH EVERY DAY 90 tablet 0    meloxicam (MOBIC) 15 MG tablet Take 1 tablet by mouth Daily.      omeprazole (priLOSEC) 40 MG capsule TAKE 1 CAPSULE BY MOUTH DAILY. 90 capsule 0    albuterol sulfate  (90 Base) MCG/ACT inhaler INHALE 2 PUFFS EVERY FOUR HOURS AS NEEDED FOR WHEEZING 8.5 g 3    Budeson-Glycopyrrol-Formoterol (Breztri Aerosphere) 160-9-4.8 MCG/ACT aerosol inhaler Inhale 2 puffs 2 (Two) Times a Day. 10.7 g 5     No facility-administered medications prior to visit.       No opioid medication identified on active medication list. I have reviewed chart for other potential  high risk medication/s and harmful drug interactions in the elderly.           Aspirin use is not indicated based on review of current medical condition/s. Risk of harm outweighs potential benefits. Patient instructed to  "discontinue this medication.  .     Problem List     Patient Active Problem List   Diagnosis    Anxiety    Encounter for general adult medical examination without abnormal findings    Gastroesophageal reflux disease    Hypertension    Osteoarthritis    DDD (degenerative disc disease), lumbosacral    Acquired hypothyroidism    Obesity    Alcohol abuse    Postmenopause    Presbyopia    Tear film insufficiency    Vitreous degeneration    Lumbar radiculopathy    Lumbar spondylosis    Degeneration of lumbar intervertebral disc    Seasonal allergic rhinitis due to pollen    Iron deficiency anemia    Cardiomegaly    Dyspnea on exertion    Restrictive lung disease    Alcohol use disorder       Advanced Care Planning   ACP discussion was held with the patient during this visit. Patient has an advance directive (not in EMR), copy requested.       Objective     Vitals:    12/29/23 1314   BP: 128/80   BP Location: Left arm   Patient Position: Sitting   Cuff Size: Large Adult   Pulse: 88   Resp: 16   SpO2: 96%   Weight: 95.5 kg (210 lb 9.6 oz)   Height: 157.5 cm (62.01\")     Estimated body mass index is 38.51 kg/m² as calculated from the following:    Height as of this encounter: 157.5 cm (62.01\").    Weight as of this encounter: 95.5 kg (210 lb 9.6 oz).          Cognitive Impairment:  Does the patient have evidence of cognitive impairment? No    Recent Labs     Lab Results   Component Value Date    TRIG 175 (H) 12/29/2023    HDL 63 (H) 12/29/2023     (H) 12/29/2023    VLDL 31 12/29/2023    HGBA1C 6.20 (H) 12/29/2023       Medicare Risk and Personalized Health Plan     Risk Factors Identified During Encounter   CMS Preventative Services Quick Reference  Inactivity/Sedentary: Patient was advised to exercise at least 150 minutes a week per CDC recommendations.  The above risks/problems have been discussed with the patient.  Pertinent information has been shared with the patient in the After Visit Summary.  An After Visit " Summary and PPPS were made available to the patient.      Diagnoses and all orders for this visit:   Diagnoses and all orders for this visit:    Encounter for annual wellness visit (AWV) in Medicare patient  -     CBC Auto Differential; Future  -     Comprehensive Metabolic Panel; Future  -     Hemoglobin A1c; Future  -     Lipid Panel; Future  -     Hepatitis C Antibody; Future  -     Iron and TIBC; Future  -     Ferritin; Future    Acquired hypothyroidism  Comments:  check labs today  Orders:  -     levothyroxine (SYNTHROID, LEVOTHROID) 50 MCG tablet; Take 1 tablet by mouth Daily.    Gastroesophageal reflux disease without esophagitis    Primary hypertension    Vitamin D deficiency  -     Vitamin D,25-Hydroxy; Future    Iron deficiency anemia, unspecified iron deficiency anemia type  -     Iron and TIBC; Future  -     Ferritin; Future    Other orders  -     Discontinue: gabapentin (NEURONTIN) 300 MG capsule  -     meloxicam (MOBIC) 15 MG tablet; Take 1 tablet by mouth Daily.  -     gabapentin (NEURONTIN) 300 MG capsule; Take 1 capsule by mouth Every Night.  -     omeprazole (priLOSEC) 40 MG capsule; Take 1 capsule by mouth Daily.  -     famotidine (PEPCID) 20 MG tablet; Take 1 tablet by mouth Every Night.             Follow Up:   Next Medicare Wellness visit to be scheduled in 1 year.            Additional E&M Note during same encounter follows:      Patient has multiple medical problems which are significant and separately identifiable that require additional work above and beyond the Medicare Wellness Visit.         Subjective     History of Present Illness   Kitty Berry is a 68 y.o. female who also presents for for follow up of chronic conditions in addition to AWV as above.     New Concerns, Chronic Conditions, HCM   Sciatica: sees pain management; maybe saw small area on spine where disc was bulging, but     Allergic Rhinitis: allegra and flonase    HCM  Colonoscopy:  Mammogram:  Pap:    Last Completed  Colonoscopy            COLORECTAL CANCER SCREENING (COLONOSCOPY - Every 5 Years) Next due on 6/3/2025      06/03/2020  COLONOSCOPY (Done)                      Review of Systems   A medically appropriate and patient-specific review of systems was performed. Pertinent findings are mentioned in the HPI, with no additional significant findings beyond those already noted.    Objective     Vitals:    12/29/23 1314   BP: 128/80   Pulse: 88   Resp: 16   SpO2: 96%        Physical Exam   Constitutional: Alert, well-appearing, no acute distress  Eyes: Vision grossly intact, sclerae anicteric, no conjunctival injection  HENT: NCAT, mucous membranes moist, normal dentition, posterior pharynx normal, bilateral TMs normal  Neck: Supple, no thyromegaly, no lymphadenopathy, trachea midline  Respiratory: No respiratory distress, good effort and air entry, clear to auscultation bilaterally   Cardiovascular: RRR, no murmurs, normal peripheral perfusion, no LE edema  Gastrointestinal: Soft, nondistended, nontender, bowel sounds present  Musculoskeletal: Strength grossly intact, appropriate ROM in all extremities, no obvious deformities or injuries  Psychiatric: Appropriate mood and affect, cooperative  Neurologic: At baseline, motor and sensory function grossly intact, moves all extremities equally  Skin: No apparent rashes or lesions      Assessment & Plan     Diagnoses and all orders for this visit:    Encounter for annual wellness visit (AWV) in Medicare patient  -     CBC Auto Differential; Future  -     Comprehensive Metabolic Panel; Future  -     Hemoglobin A1c; Future  -     Lipid Panel; Future  -     Hepatitis C Antibody; Future  -     Iron and TIBC; Future  -     Ferritin; Future    Acquired hypothyroidism  Comments:  check labs today  Orders:  -     levothyroxine (SYNTHROID, LEVOTHROID) 50 MCG tablet; Take 1 tablet by mouth Daily.    Gastroesophageal reflux disease without esophagitis    Primary hypertension    Vitamin D  deficiency  -     Vitamin D,25-Hydroxy; Future    Iron deficiency anemia, unspecified iron deficiency anemia type  -     Iron and TIBC; Future  -     Ferritin; Future    Other orders  -     Discontinue: gabapentin (NEURONTIN) 300 MG capsule  -     meloxicam (MOBIC) 15 MG tablet; Take 1 tablet by mouth Daily.  -     gabapentin (NEURONTIN) 300 MG capsule; Take 1 capsule by mouth Every Night.  -     omeprazole (priLOSEC) 40 MG capsule; Take 1 capsule by mouth Daily.  -     famotidine (PEPCID) 20 MG tablet; Take 1 tablet by mouth Every Night.             Today's Changes         Follow Up   Return in about 6 months (around 6/29/2024) for med refills, chronic conditions.  Patient was given instructions and counseling regarding her condition or for health maintenance advice. Please see specific information pulled into the AVS if appropriate.     This medical documentation was produced in part using speech recognition software (Dragon Dictation System) and may contain errors related to that system including errors in grammar, punctuation, and spelling, as well as words and phrases that may be inappropriate and not intentional --- If there are any questions or concerns please feel free to contact me, the dictating provider, for clarification.      Cici Martinez MD

## 2023-12-30 LAB
25(OH)D3 SERPL-MCNC: 68.5 NG/ML (ref 30–100)
ALBUMIN SERPL-MCNC: 4.4 G/DL (ref 3.5–5.2)
ALBUMIN/GLOB SERPL: 2 G/DL
ALP SERPL-CCNC: 119 U/L (ref 39–117)
ALT SERPL W P-5'-P-CCNC: 84 U/L (ref 1–33)
ANION GAP SERPL CALCULATED.3IONS-SCNC: 12 MMOL/L (ref 5–15)
AST SERPL-CCNC: 60 U/L (ref 1–32)
BASOPHILS # BLD AUTO: 0.03 10*3/MM3 (ref 0–0.2)
BASOPHILS NFR BLD AUTO: 0.4 % (ref 0–1.5)
BILIRUB SERPL-MCNC: 0.3 MG/DL (ref 0–1.2)
BUN SERPL-MCNC: 17 MG/DL (ref 8–23)
BUN/CREAT SERPL: 20.2 (ref 7–25)
CALCIUM SPEC-SCNC: 9.2 MG/DL (ref 8.6–10.5)
CHLORIDE SERPL-SCNC: 102 MMOL/L (ref 98–107)
CHOLEST SERPL-MCNC: 224 MG/DL (ref 0–200)
CO2 SERPL-SCNC: 26 MMOL/L (ref 22–29)
CREAT SERPL-MCNC: 0.84 MG/DL (ref 0.57–1)
DEPRECATED RDW RBC AUTO: 45.9 FL (ref 37–54)
EGFRCR SERPLBLD CKD-EPI 2021: 74.9 ML/MIN/1.73
EOSINOPHIL # BLD AUTO: 0.17 10*3/MM3 (ref 0–0.4)
EOSINOPHIL NFR BLD AUTO: 2.2 % (ref 0.3–6.2)
ERYTHROCYTE [DISTWIDTH] IN BLOOD BY AUTOMATED COUNT: 16.4 % (ref 12.3–15.4)
FERRITIN SERPL-MCNC: 31.2 NG/ML (ref 13–150)
GLOBULIN UR ELPH-MCNC: 2.2 GM/DL
GLUCOSE SERPL-MCNC: 129 MG/DL (ref 65–99)
HBA1C MFR BLD: 6.2 % (ref 4.8–5.6)
HCT VFR BLD AUTO: 36.2 % (ref 34–46.6)
HCV AB SER DONR QL: NORMAL
HDLC SERPL-MCNC: 63 MG/DL (ref 40–60)
HGB BLD-MCNC: 11.4 G/DL (ref 12–15.9)
IMM GRANULOCYTES # BLD AUTO: 0.02 10*3/MM3 (ref 0–0.05)
IMM GRANULOCYTES NFR BLD AUTO: 0.3 % (ref 0–0.5)
IRON 24H UR-MRATE: 38 MCG/DL (ref 37–145)
IRON SATN MFR SERPL: 7 % (ref 20–50)
LDLC SERPL CALC-MCNC: 130 MG/DL (ref 0–100)
LDLC/HDLC SERPL: 2 {RATIO}
LYMPHOCYTES # BLD AUTO: 2.01 10*3/MM3 (ref 0.7–3.1)
LYMPHOCYTES NFR BLD AUTO: 25.5 % (ref 19.6–45.3)
MCH RBC QN AUTO: 24.8 PG (ref 26.6–33)
MCHC RBC AUTO-ENTMCNC: 31.5 G/DL (ref 31.5–35.7)
MCV RBC AUTO: 78.9 FL (ref 79–97)
MONOCYTES # BLD AUTO: 0.53 10*3/MM3 (ref 0.1–0.9)
MONOCYTES NFR BLD AUTO: 6.7 % (ref 5–12)
NEUTROPHILS NFR BLD AUTO: 5.12 10*3/MM3 (ref 1.7–7)
NEUTROPHILS NFR BLD AUTO: 64.9 % (ref 42.7–76)
NRBC BLD AUTO-RTO: 0 /100 WBC (ref 0–0.2)
PLATELET # BLD AUTO: 252 10*3/MM3 (ref 140–450)
PMV BLD AUTO: 10.6 FL (ref 6–12)
POTASSIUM SERPL-SCNC: 4 MMOL/L (ref 3.5–5.2)
PROT SERPL-MCNC: 6.6 G/DL (ref 6–8.5)
RBC # BLD AUTO: 4.59 10*6/MM3 (ref 3.77–5.28)
SODIUM SERPL-SCNC: 140 MMOL/L (ref 136–145)
TIBC SERPL-MCNC: 554 MCG/DL (ref 298–536)
TRANSFERRIN SERPL-MCNC: 372 MG/DL (ref 200–360)
TRIGL SERPL-MCNC: 175 MG/DL (ref 0–150)
VLDLC SERPL-MCNC: 31 MG/DL (ref 5–40)
WBC NRBC COR # BLD AUTO: 7.88 10*3/MM3 (ref 3.4–10.8)

## 2024-03-04 RX ORDER — GABAPENTIN 300 MG/1
300 CAPSULE ORAL
Qty: 30 CAPSULE | Refills: 0 | Status: SHIPPED | OUTPATIENT
Start: 2024-03-04

## 2024-04-01 RX ORDER — GABAPENTIN 300 MG/1
300 CAPSULE ORAL
Qty: 30 CAPSULE | Refills: 0 | Status: SHIPPED | OUTPATIENT
Start: 2024-04-01

## 2024-04-01 RX ORDER — FAMOTIDINE 20 MG/1
20 TABLET, FILM COATED ORAL EVERY EVENING
Qty: 30 TABLET | Refills: 1 | Status: SHIPPED | OUTPATIENT
Start: 2024-04-01

## 2024-04-01 RX ORDER — AMLODIPINE BESYLATE 5 MG/1
TABLET ORAL
Qty: 90 TABLET | Refills: 0 | Status: SHIPPED | OUTPATIENT
Start: 2024-04-01

## 2024-05-06 RX ORDER — GABAPENTIN 300 MG/1
300 CAPSULE ORAL
Qty: 30 CAPSULE | Refills: 0 | Status: SHIPPED | OUTPATIENT
Start: 2024-05-06

## 2024-06-04 RX ORDER — FAMOTIDINE 20 MG/1
20 TABLET, FILM COATED ORAL EVERY EVENING
Qty: 30 TABLET | Refills: 0 | Status: SHIPPED | OUTPATIENT
Start: 2024-06-04

## 2024-06-19 ENCOUNTER — OFFICE VISIT (OUTPATIENT)
Dept: FAMILY MEDICINE CLINIC | Facility: CLINIC | Age: 69
End: 2024-06-19
Payer: MEDICARE

## 2024-06-19 VITALS
OXYGEN SATURATION: 96 % | SYSTOLIC BLOOD PRESSURE: 122 MMHG | WEIGHT: 204.8 LBS | DIASTOLIC BLOOD PRESSURE: 68 MMHG | HEART RATE: 88 BPM | BODY MASS INDEX: 37.69 KG/M2 | HEIGHT: 62 IN | RESPIRATION RATE: 18 BRPM

## 2024-06-19 DIAGNOSIS — R06.83 SNORING: ICD-10-CM

## 2024-06-19 DIAGNOSIS — E66.01 CLASS 2 SEVERE OBESITY WITH SERIOUS COMORBIDITY AND BODY MASS INDEX (BMI) OF 37.0 TO 37.9 IN ADULT, UNSPECIFIED OBESITY TYPE: Chronic | ICD-10-CM

## 2024-06-19 DIAGNOSIS — M17.12 PRIMARY OSTEOARTHRITIS OF LEFT KNEE: ICD-10-CM

## 2024-06-19 DIAGNOSIS — Z01.818 PREOPERATIVE CLEARANCE: Primary | ICD-10-CM

## 2024-06-19 PROCEDURE — 1159F MED LIST DOCD IN RCRD: CPT | Performed by: NURSE PRACTITIONER

## 2024-06-19 PROCEDURE — 3078F DIAST BP <80 MM HG: CPT | Performed by: NURSE PRACTITIONER

## 2024-06-19 PROCEDURE — 3074F SYST BP LT 130 MM HG: CPT | Performed by: NURSE PRACTITIONER

## 2024-06-19 PROCEDURE — 99214 OFFICE O/P EST MOD 30 MIN: CPT | Performed by: NURSE PRACTITIONER

## 2024-06-19 PROCEDURE — 1160F RVW MEDS BY RX/DR IN RCRD: CPT | Performed by: NURSE PRACTITIONER

## 2024-06-19 PROCEDURE — 1126F AMNT PAIN NOTED NONE PRSNT: CPT | Performed by: NURSE PRACTITIONER

## 2024-06-19 NOTE — ASSESSMENT & PLAN NOTE
Patient's (Body mass index is 37.45 kg/m².) indicates that they are morbidly/severely obese (BMI > 40 or > 35 with obesity - related health condition) with health conditions that include hypertension, GERD, and osteoarthritis . Weight is unchanged. BMI  is above average; BMI management plan is completed. We discussed portion control and increasing exercise.

## 2024-06-19 NOTE — PROGRESS NOTES
"Chief Complaint  Pre-op Exam (Left knee surgery )  Subjective        Kitty Berry presents to [facility]  History of Present Illness  Pt comes in today for preop clearance for poss left knee replacement.   Seeing Dr. Richardson. Surgery not scheduled yet.   Has received clearance from pulm  She is with c/o some exertional SOA, but not new. Had echo and stress test in 2021 and both normal.  Had another echo done recently in Oct 2023 and it was normal as well.  She is seeing pul for lung scarring 2/2 covid.   Does get some anxiety after anesthesia, but otherwise no negative reactions.   Unknown hx of WILLIAN. No formal sleep studies in the past. She does states she snores.        Objective     Vital Signs:   /68   Pulse 88   Resp 18   Ht 157.5 cm (62.01\")   Wt 92.9 kg (204 lb 12.8 oz)   SpO2 96%   BMI 37.45 kg/m²       BP Readings from Last 3 Encounters:   06/19/24 122/68   12/29/23 128/80   09/13/23 126/78       Wt Readings from Last 3 Encounters:   06/19/24 92.9 kg (204 lb 12.8 oz)   12/29/23 95.5 kg (210 lb 9.6 oz)   09/13/23 90.7 kg (200 lb)     Physical Exam  Constitutional:       Appearance: She is well-developed.   Eyes:      Pupils: Pupils are equal, round, and reactive to light.   Cardiovascular:      Rate and Rhythm: Normal rate and regular rhythm.   Pulmonary:      Effort: Pulmonary effort is normal.      Breath sounds: Normal breath sounds.   Neurological:      Mental Status: She is alert and oriented to person, place, and time.        Result Review :                 Assessment and Plan    Diagnoses and all orders for this visit:    1. Preoperative clearance (Primary)    2. Snoring  -     Ambulatory Referral to Sleep Medicine    3. Class 2 severe obesity with serious comorbidity and body mass index (BMI) of 37.0 to 37.9 in adult, unspecified obesity type  Assessment & Plan:  Patient's (Body mass index is 37.45 kg/m².) indicates that they are morbidly/severely obese " (BMI > 40 or > 35 with obesity - related health condition) with health conditions that include hypertension, GERD, and osteoarthritis . Weight is unchanged. BMI  is above average; BMI management plan is completed. We discussed portion control and increasing exercise.       4. Primary osteoarthritis of left knee    Referral to sleep medicine  Pt has been cleared by pulm  Pt cleared from my standpoint   During this office visit, we discussed the pertinent aspects of the visit and treatment recommendations. Pt verbalizes understanding. Follow up was discussed. Patient was given the opportunity to ask questions and discuss other concerns.         Follow Up   Return if symptoms worsen or fail to improve, for Next scheduled follow up.  Patient was given instructions and counseling regarding her condition or for health maintenance advice. Please see specific information pulled into the AVS if appropriate.

## 2024-06-20 ENCOUNTER — TELEPHONE (OUTPATIENT)
Dept: FAMILY MEDICINE CLINIC | Facility: CLINIC | Age: 69
End: 2024-06-20

## 2024-06-20 NOTE — TELEPHONE ENCOUNTER
Hub staff attempted to follow warm transfer process and was unsuccessful     Caller: Kitty Berry    Relationship to patient: Self    Best call back number:     Kitty Berry (Self) [phone] (Mobile)       Patient is needing: RESCHEDULE HER APPT - 30 MIN TIME SLOT     DOES NOT WANT TUES OR THURS

## 2024-06-26 DIAGNOSIS — F41.9 ANXIETY: ICD-10-CM

## 2024-06-26 RX ORDER — ALPRAZOLAM 0.5 MG/1
TABLET ORAL
Qty: 60 TABLET | Refills: 1 | Status: SHIPPED | OUTPATIENT
Start: 2024-06-26

## 2024-06-26 RX ORDER — AMLODIPINE BESYLATE 5 MG/1
TABLET ORAL
Qty: 90 TABLET | Refills: 0 | Status: SHIPPED | OUTPATIENT
Start: 2024-06-26

## 2024-07-08 RX ORDER — FAMOTIDINE 20 MG/1
20 TABLET, FILM COATED ORAL EVERY EVENING
Qty: 30 TABLET | Refills: 3 | Status: SHIPPED | OUTPATIENT
Start: 2024-07-08

## 2024-07-19 ENCOUNTER — OFFICE VISIT (OUTPATIENT)
Dept: FAMILY MEDICINE CLINIC | Facility: CLINIC | Age: 69
End: 2024-07-19
Payer: MEDICARE

## 2024-07-19 VITALS
OXYGEN SATURATION: 97 % | BODY MASS INDEX: 37.91 KG/M2 | RESPIRATION RATE: 16 BRPM | WEIGHT: 206 LBS | HEART RATE: 75 BPM | DIASTOLIC BLOOD PRESSURE: 83 MMHG | SYSTOLIC BLOOD PRESSURE: 133 MMHG | HEIGHT: 62 IN

## 2024-07-19 DIAGNOSIS — R60.0 BILATERAL LEG EDEMA: Primary | ICD-10-CM

## 2024-07-19 PROCEDURE — 99214 OFFICE O/P EST MOD 30 MIN: CPT | Performed by: STUDENT IN AN ORGANIZED HEALTH CARE EDUCATION/TRAINING PROGRAM

## 2024-07-19 PROCEDURE — 3079F DIAST BP 80-89 MM HG: CPT | Performed by: STUDENT IN AN ORGANIZED HEALTH CARE EDUCATION/TRAINING PROGRAM

## 2024-07-19 PROCEDURE — 1160F RVW MEDS BY RX/DR IN RCRD: CPT | Performed by: STUDENT IN AN ORGANIZED HEALTH CARE EDUCATION/TRAINING PROGRAM

## 2024-07-19 PROCEDURE — 1159F MED LIST DOCD IN RCRD: CPT | Performed by: STUDENT IN AN ORGANIZED HEALTH CARE EDUCATION/TRAINING PROGRAM

## 2024-07-19 PROCEDURE — 1126F AMNT PAIN NOTED NONE PRSNT: CPT | Performed by: STUDENT IN AN ORGANIZED HEALTH CARE EDUCATION/TRAINING PROGRAM

## 2024-07-19 PROCEDURE — 3075F SYST BP GE 130 - 139MM HG: CPT | Performed by: STUDENT IN AN ORGANIZED HEALTH CARE EDUCATION/TRAINING PROGRAM

## 2024-07-19 RX ORDER — BIMATOPROST 0.1 MG/ML
1 SOLUTION/ DROPS OPHTHALMIC NIGHTLY
COMMUNITY
Start: 2024-03-15

## 2024-07-19 RX ORDER — MELOXICAM 15 MG/1
15 TABLET ORAL DAILY
COMMUNITY
Start: 2024-07-17

## 2024-07-24 ENCOUNTER — OFFICE VISIT (OUTPATIENT)
Dept: SLEEP MEDICINE | Facility: CLINIC | Age: 69
End: 2024-07-24
Payer: MEDICARE

## 2024-07-24 VITALS
BODY MASS INDEX: 38.16 KG/M2 | WEIGHT: 207.4 LBS | DIASTOLIC BLOOD PRESSURE: 67 MMHG | OXYGEN SATURATION: 95 % | SYSTOLIC BLOOD PRESSURE: 121 MMHG | HEIGHT: 62 IN | HEART RATE: 88 BPM

## 2024-07-24 DIAGNOSIS — R29.818 SUSPECTED SLEEP APNEA: Primary | ICD-10-CM

## 2024-07-24 DIAGNOSIS — I10 ESSENTIAL HYPERTENSION: ICD-10-CM

## 2024-07-24 DIAGNOSIS — J98.4 RESTRICTIVE LUNG DISEASE: ICD-10-CM

## 2024-07-24 DIAGNOSIS — E66.01 CLASS 2 SEVERE OBESITY WITH SERIOUS COMORBIDITY AND BODY MASS INDEX (BMI) OF 37.0 TO 37.9 IN ADULT, UNSPECIFIED OBESITY TYPE: ICD-10-CM

## 2024-07-24 DIAGNOSIS — R06.83 SNORING: ICD-10-CM

## 2024-07-24 DIAGNOSIS — F41.9 ANXIETY: ICD-10-CM

## 2024-07-24 DIAGNOSIS — Z72.821 INADEQUATE SLEEP HYGIENE: ICD-10-CM

## 2024-07-24 DIAGNOSIS — Z78.9 ALCOHOL USE: ICD-10-CM

## 2024-07-24 DIAGNOSIS — R06.81 WITNESSED EPISODE OF APNEA: ICD-10-CM

## 2024-07-24 DIAGNOSIS — G47.19 EXCESSIVE DAYTIME SLEEPINESS: ICD-10-CM

## 2024-07-24 PROCEDURE — G0463 HOSPITAL OUTPT CLINIC VISIT: HCPCS

## 2024-07-24 PROCEDURE — 3074F SYST BP LT 130 MM HG: CPT | Performed by: FAMILY MEDICINE

## 2024-07-24 PROCEDURE — 3078F DIAST BP <80 MM HG: CPT | Performed by: FAMILY MEDICINE

## 2024-07-24 PROCEDURE — 1160F RVW MEDS BY RX/DR IN RCRD: CPT | Performed by: FAMILY MEDICINE

## 2024-07-24 PROCEDURE — 1159F MED LIST DOCD IN RCRD: CPT | Performed by: FAMILY MEDICINE

## 2024-07-24 PROCEDURE — 99204 OFFICE O/P NEW MOD 45 MIN: CPT | Performed by: FAMILY MEDICINE

## 2024-07-24 NOTE — PROGRESS NOTES
[facility]  Formerly [facility], [address]  [place] [address]  Phone   Fax       Kitty Berry  [id number]   1955  68 y.o.  female      Referring physician/provider CHALINO Velasquez  PCP Cici Martinez MD    Type of service: Initial Sleep Medicine Consult.  Date of service: 7/24/2024      Chief Complaint   Patient presents with    Witnessed Apnea    Snoring       History of present illness;  The patient was seen today on 7/24/2024 at [facility].    Thank you for asking to see Kitty Berry, 68 y.o. PMHx HTN, anxiety (on Xanax 0.5 mg), restrictive lung disease, history of alcohol use disorder listed in EMR, DDD, lumbar spondylosis, lumbar radiculopathy, hypothyroidism, obesity.  The patient presents for initial evaluation of sleep sleep disordered breathing.  Patient  denies prior surgery namely tonsillectomy, nasal surgery or UPPP.     Snoring and witnessed apneas - by her   She wakes up gasping for air at night goes back to sleep may reoccur    Obstructive Sleep Apnea Screening: STOP-BANG Sleep Apnea Questionnaire. Reference: Drew F et al. Br J Anaesth, 2012.     Criterion    Yes    No  Do you SNORE loudly?   [x]   Yes  []   No   Do you often feel TIRED, fatigued, or sleepy during the day?    [x]   Yes  []   No  Has anyone OBSERVED you stop breathing during your sleep?    [x]   Yes  []   No  Do you have or are you being treated for high blood PRESSURE?    [x]   Yes  []   No  BMI >32 kg/m2     [x]   Yes  []   No  AGE > 50 years    [x]   Yes  []   No  NECK circumference >16 inches / 40 cm    []   Yes  [x]   No  GENDER: male     []   Yes  [x]   No    WILLIAN Probability:  []   1-2 - Low  []   3-4 - Intermediate  [x]   5-8 - High    She has been diagnosed with restrictive lung disease after CoV PNA ~4 years ago  Denies need for home supplemental O2  Former 1 PPD cigarette smoker for 5 years quit in 1999      Anxiety - on xanax prn  usually takes qhs      Save what is noted above in HPI, denies any OTHER past known:  cardiopulmonary conditions/neurologic disorders/neuromuscular disorders  Never needed supplemental O2 at home  Denies any opioid therapy  Denies any metal in head/neck/chest      Further Sleep History:    Bedtime: 11-11:30 PM  Rise Time: 830-9:30 AM  Sleep Latency: Less than 20-minute  Screens in bed: Yes  Wake after sleep onset: 2-4 times  Reasons for awakenings: Nocturia  Number of naps per day denies  Naps restorative: Not applicable  Caffeine use: 4 beverages per day at least    RLS Symptoms: No   Bruxism:No   Current sleep related gastroesophageal reflux symptoms:  No   Cataplexy:  No   Sleep Paralysis:  No   Hypnagogic or hypnopompic hallucinations: No   Parasomnias such as sleep walking or sleep eating No     Disclaimer Sleep History: The above sleep history is based on this sleep physician's in room encounter with the patient. Pre encounter self administered questionnaires are taken into consideration and discussed with patient for any discordance. The above documentation by this sleep physician is the most accurate clinical information determined by in room sleep physician encounter with patient.     MEDICAL CONDITIONS (PMH)   HTN  Anxiety  DDD  Lumbar radiculopathy  Arthritis  Hypothyroidism    Social history:  Do you drive a commercial vehicle:  No   Shift work:  No   Tobacco use:  No   Alcohol use: 3 beverages per day  Occupation: Retired    Family Hx (parents and siblings) (pertaining to sleep medicine)  HTN  Asthma  DM  Malignancy Colon    Medications: reviewed    Review of systems is negative unless otherwise noted per HPI   Disclaimer History: The above history is based on this sleep physician's in room encounter with the patient. Pre encounter self administered questionnaires are taken into consideration and discussed with patient for any discordance. The above documentation by this sleep physician is the most  "accurate clinical information determined by in room sleep physician encounter with patient.     Physical exam:  Vitals:    07/24/24 0700   BP: 121/67   BP Location: Left arm   Patient Position: Sitting   Pulse: 88   SpO2: 95%   Weight: 94.1 kg (207 lb 6.4 oz)   Height: 157.5 cm (62\")    Body mass index is 37.93 kg/m².   CONSTITUTIONAL:  Non-toxic, In no overt distress   Head: normocephalic   ENT: Mallampati class IV, + macroglossia, no septal defects   NECK: 15 inch neck circumference, no nuchal rigidity  RESPIRATORY SYSTEM: Breath sounds are clear (no rales, no rhonchi, no wheezes), no accessory muscle use  CARDIOVASULAR SYSTEM: Heart sounds are regular rhythm and normal rate, no rub, no gallop, no edema  NEUROLOGICAL SYSTEM: Oriented x 3, No gross focal deficits   PSYCHIATRIC SYSTEM: Goal oriented, affect full range appropriate      Office notes from care team reviewed:    -6/19/2024 office visit PCP CHALINO Velasquez noted snoring    Labs reviewed.  TSH          8/2/2023    10:11   TSH   TSH 1.870       Most Recent A1C          12/29/2023    14:06   HGBA1C Most Recent   Hemoglobin A1C 6.20     12/29/23 Bicarb 26.0    Diagnostics reviewed:    - 8/18/2023 PFTs impression by interpreting physician:  Spirometry  FVC 1.96 L which is 70%  FEV1 1.69 L which is 79%  FEV1/FVC ratio 86.     Lung volumes total lung capacity 74%  Residual 58%     Diffusion capacity 59%.     Flow volume loop suggestive of restrictive pattern.     Impression Dr. Nadia Mcclure  Above study suggestive of mild to moderate restrictive lung disease         - 4/16/2021 echocardiogram cardiologist's interpretation summary: Impression  Structurally and functionally normal cardiac valves.  Left ventricular size and contractility is normal with ejection fraction of 60%.      Assessment and plan:  Suspected sleep apnea [R29.818] patient's symptoms and examination is consistent with sleep apnea (G47.30). I have talked to the patient about the signs " and symptoms of sleep apnea. In addition, I have also discussed pathophysiology of sleep apnea.  I also discussed the complications of untreated sleep apnea including effects on hypertension, diabetes mellitus and nonrestorative sleep with hypersomnia which can increase risk for motor vehicle accidents.  Untreated sleep apnea is also a risk factor for development of atrial fibrillation, hypertension, insulin resistance and cerebrovascular accident.  Discussed in detail of various testing methods including home-based and lab based sleep studies.  Based on history and physical examination and other comorbidities the most appropriate study is to order SPLIT AHI > 15 in laboratory polysomnography, rather than home sleep apnea testing,to rule out diagnosis of sleep apnea per AASM clinical practice guidelines (doi:10.5664/jcsm.6506) secondary to patient’s history of: restrictive lung disease. Most certainly at least moderate severity sleep apnea.  -May take her home medication for sleep study to include her xanax prn   Patient was given opportunity to ask questions and all the questions were answered.   Snoring (R06.83), snoring is the sound created by turbulent airflow vibrating upper airway soft tissue due to limitation of inspiratory airflow. I have also discussed factors affecting snoring including sleep deprivation, sleeping on the back and alcohol ingestion. To minimize snoring, patient is advised to have adequate sleep, sleep on the side and avoid alcohol and sedative medications before bedtime  Excessive daytime sleepiness .  Patient endorses subjective excessive daytime sleepiness with sleep physician encounter which was inconsistent with patient's pre-encounter self-administered [place] Sleepiness Scale of Total score: 1.  There are many causes for daytime excessive sleepiness including depression, shiftwork syndrome, and other medical disorders including heart, kidney and liver failure.  From sleep disorders  perspective this is sleep disordered breathing until proven otherwise. The most common cause of excessive sleepiness is due to sleep apnea with frequent awakenings during sleep time.  I have discussed safety of driving and to remain vigilant while driving; patient verbalized understanding of counseling.  Obesity, patient's BMI is Body mass index is 37.93 kg/m².. I have discussed the relationship between weight and sleep apnea.There is direct correlation between weight and severity of sleep apnea.  Weight reduction is encouraged, as it is going to reduce the severity of sleep apnea. I have also discussed with the patient diet and exercise to achieve ideal body weight.  Inadequate sleep hygiene [Z72.821]  Counseled the patient lifestyle modifications as below to be applied especially night of any sleep study, the patient verbalized understanding of same. Follow up with PCP to reinforce my counseling towards healthy lifestyle modifications if sleep studies are negative.  Restrictive Lung disease, In lab split as stated above. Follow up with PCP/Pulmonology as previous.  HTN,  Follow up with primary care physician for continued management. This medical condition would make the patient eligible for a trial of PAP therapy even if sleep study reveals mild severity sleep apnea.  Daily alcohol use/Hx of alcohol use disorder, Counseled to avoid alcohol before bed. Alcohol has a significant effect on sleep architecture. At all dosages, it reduces sleep onset latency, consolidates the first half of sleep and increases sleep disruption during the second half of the night.  Additionally, alcohol may induce apneas even in asymptomatic snorers. Follow up with PCP to review daily alcohol use.  Anxiety, Follow up with primary care physician for continued management. Comorbid mood disorders would make the patient eligible for a trial of PAP therapy even if sleep study reveals mild severity sleep apnea.        I have also discussed  with the patient the following  Sleep hygiene: Maintaining a regular bedtime and wake time, not to watch television or work in bed, limit caffeine-containing beverages before bed time and avoid naps during the day  Adequate amount of sleep.  Generally most people needs about 7 to 8 hours of sleep.       Patient's questions were answered      I once again thank you for asking me to see this patient in consultation and I have forwarded my opinion and treatment plan.  Please do not hesitate to call me if you have any questions.       EMR Dragon/Transcription disclaimer:   Much of this encounter note is an electronic transcription/translation of spoken language to printed text. The electronic translation of spoken language may permit erroneous, or at times, nonsensical words or phrases to be inadvertently transcribed; Although I have reviewed the note for such errors, some may still exist.     NPI #: [id number]    Kaitlyn Hull, DO  Sleep Medicine  [facility]  07/24/24

## 2024-08-05 RX ORDER — FUROSEMIDE 20 MG/1
20 TABLET ORAL DAILY
Qty: 30 TABLET | Refills: 1 | OUTPATIENT
Start: 2024-08-05

## 2024-08-08 ENCOUNTER — TELEPHONE (OUTPATIENT)
Dept: FAMILY MEDICINE CLINIC | Facility: CLINIC | Age: 69
End: 2024-08-08
Payer: MEDICARE

## 2024-08-08 NOTE — TELEPHONE ENCOUNTER
Caller: Kitty Berry    Relationship to patient: Self    Best call back number: [phone]    Where are you experiencing symptoms: FEET SWELLING, IS NOT URINATING ANYMORE AFTER TAKING LASIX    When have you experienced or been treated for these symptoms before: WAS SEEN ON 7/19    What therapies/medications have you tried: furosemide (LASIX) 20 MG tablet     WOULD LIKE TO KNOW IF SHE NEEDS TO INCREASE HER MEDICATION AND WHY SHE IS HAVING SWELLING.      If a prescription is needed, what is your preferred pharmacy:   [facility], [place] - [address] [place] [address] - [phone]  - [phone]   [address] [place] [address] [place] [address]  Phone: [phone] Fax: [phone]

## 2024-08-22 ENCOUNTER — TELEPHONE (OUTPATIENT)
Dept: FAMILY MEDICINE CLINIC | Facility: CLINIC | Age: 69
End: 2024-08-22
Payer: MEDICARE

## 2024-08-22 NOTE — TELEPHONE ENCOUNTER
PT STATES SHE IS IMPACTED, FEELS LIKE IT'S HALF IN , HALF OUT. RECTUM STARTING TO HURT. PT HAS MIRALAX, DOESN'T KNOW IF THAT IS STRONG ENOUGH OR IF SHE SHOULD GO TO ED. OFFERED APPOINTMENT. PLEASE ADVISE.

## 2024-09-24 RX ORDER — GABAPENTIN 300 MG/1
300 CAPSULE ORAL
Qty: 30 CAPSULE | Refills: 0 | Status: SHIPPED | OUTPATIENT
Start: 2024-09-24

## 2024-09-24 RX ORDER — AMLODIPINE BESYLATE 5 MG/1
TABLET ORAL
Qty: 90 TABLET | Refills: 0 | Status: SHIPPED | OUTPATIENT
Start: 2024-09-24

## 2024-10-30 DIAGNOSIS — F41.9 ANXIETY: ICD-10-CM

## 2024-10-30 RX ORDER — ALPRAZOLAM 0.5 MG
1 TABLET ORAL 2 TIMES DAILY PRN
Qty: 60 TABLET | Refills: 1 | Status: SHIPPED | OUTPATIENT
Start: 2024-10-30

## 2024-11-04 RX ORDER — AMLODIPINE BESYLATE 5 MG/1
5 TABLET ORAL DAILY
Qty: 90 TABLET | Refills: 0 | Status: SHIPPED | OUTPATIENT
Start: 2024-11-04

## 2024-11-04 NOTE — TELEPHONE ENCOUNTER
Caller: Kitty Berry SIDNEY    Relationship: Self    Best call back number: [phone]       Requested Prescriptions:   Requested Prescriptions     Pending Prescriptions Disp Refills    amLODIPine (NORVASC) 5 MG tablet 90 tablet 0     Sig: Take 1 tablet by mouth Daily.        Pharmacy where request should be sent: [facility][id number] [address] AT [facility] [address] - [phone]  - [phone] FX     Last office visit with prescribing clinician: 7/19/2024   Last telemedicine visit with prescribing clinician: Visit date not found   Next office visit with prescribing clinician: Visit date not found     Additional details provided by patient: PATIENT STATES THAT SHE [place] AND NEEDS TO GET REFILLS ON THIS. PATIENT STATES THAT IT MAY BE EARLY TO REFILL BUT SHE IS OUT OF IT BEFORE SHE GETS HOME. SHE STATES SHE WOULD BE OKAY WITH 4 PILLS UNTIL SHE CAN GET HOME IF THAT IS WHAT IS NEEDED.     Does the patient have less than a 3 day supply:  [x] Yes  [] No    Would you like a call back once the refill request has been completed: [] Yes [x] No    If the office needs to give you a call back, can they leave a voicemail: [] Yes [x] No    Charlie Kowalski Rep   11/04/24 12:06 EST

## 2024-11-08 ENCOUNTER — TRANSCRIBE ORDERS (OUTPATIENT)
Dept: ADMINISTRATIVE | Facility: HOSPITAL | Age: 69
End: 2024-11-08
Payer: MEDICARE

## 2024-11-08 DIAGNOSIS — Z12.31 VISIT FOR SCREENING MAMMOGRAM: Primary | ICD-10-CM

## 2024-11-12 RX ORDER — FAMOTIDINE 20 MG/1
20 TABLET, FILM COATED ORAL EVERY EVENING
Qty: 30 TABLET | Refills: 2 | Status: SHIPPED | OUTPATIENT
Start: 2024-11-12

## 2024-12-12 ENCOUNTER — HOSPITAL ENCOUNTER (OUTPATIENT)
Dept: MAMMOGRAPHY | Facility: HOSPITAL | Age: 69
Discharge: HOME | End: 2024-12-12
Admitting: STUDENT IN AN ORGANIZED HEALTH CARE EDUCATION/TRAINING PROGRAM
Payer: MEDICARE

## 2024-12-12 DIAGNOSIS — Z12.31 VISIT FOR SCREENING MAMMOGRAM: ICD-10-CM

## 2024-12-12 PROCEDURE — 77067 SCR MAMMO BI INCL CAD: CPT

## 2024-12-12 PROCEDURE — 77063 BREAST TOMOSYNTHESIS BI: CPT

## 2024-12-30 RX ORDER — AMLODIPINE BESYLATE 5 MG/1
5 TABLET ORAL DAILY
Qty: 90 TABLET | Refills: 0 | Status: SHIPPED | OUTPATIENT
Start: 2024-12-30

## 2025-01-27 ENCOUNTER — OFFICE VISIT (OUTPATIENT)
Dept: FAMILY MEDICINE CLINIC | Facility: CLINIC | Age: 70
End: 2025-01-27
Payer: MEDICARE

## 2025-01-27 ENCOUNTER — LAB (OUTPATIENT)
Dept: FAMILY MEDICINE CLINIC | Facility: CLINIC | Age: 70
End: 2025-01-27
Payer: MEDICARE

## 2025-01-27 VITALS
HEIGHT: 62 IN | WEIGHT: 202.5 LBS | BODY MASS INDEX: 37.26 KG/M2 | SYSTOLIC BLOOD PRESSURE: 128 MMHG | DIASTOLIC BLOOD PRESSURE: 84 MMHG | RESPIRATION RATE: 18 BRPM | HEART RATE: 78 BPM | OXYGEN SATURATION: 97 %

## 2025-01-27 DIAGNOSIS — E03.9 ACQUIRED HYPOTHYROIDISM: ICD-10-CM

## 2025-01-27 DIAGNOSIS — Z23 INFLUENZA VACCINE NEEDED: ICD-10-CM

## 2025-01-27 DIAGNOSIS — Z00.00 MEDICARE ANNUAL WELLNESS VISIT, SUBSEQUENT: Primary | ICD-10-CM

## 2025-01-27 DIAGNOSIS — D50.9 IRON DEFICIENCY ANEMIA, UNSPECIFIED IRON DEFICIENCY ANEMIA TYPE: ICD-10-CM

## 2025-01-27 DIAGNOSIS — F41.9 ANXIETY: ICD-10-CM

## 2025-01-27 DIAGNOSIS — Z00.00 MEDICARE ANNUAL WELLNESS VISIT, SUBSEQUENT: ICD-10-CM

## 2025-01-27 DIAGNOSIS — I10 PRIMARY HYPERTENSION: ICD-10-CM

## 2025-01-27 LAB
ALBUMIN SERPL-MCNC: 4.2 G/DL (ref 3.5–5.2)
ALBUMIN/GLOB SERPL: 1.5 G/DL
ALP SERPL-CCNC: 112 U/L (ref 39–117)
ALT SERPL W P-5'-P-CCNC: 38 U/L (ref 1–33)
ANION GAP SERPL CALCULATED.3IONS-SCNC: 9.2 MMOL/L (ref 5–15)
AST SERPL-CCNC: 31 U/L (ref 1–32)
BASOPHILS # BLD AUTO: 0.02 10*3/MM3 (ref 0–0.2)
BASOPHILS NFR BLD AUTO: 0.3 % (ref 0–1.5)
BILIRUB SERPL-MCNC: 0.3 MG/DL (ref 0–1.2)
BUN SERPL-MCNC: 13 MG/DL (ref 8–23)
BUN/CREAT SERPL: 15.7 (ref 7–25)
CALCIUM SPEC-SCNC: 9.5 MG/DL (ref 8.6–10.5)
CHLORIDE SERPL-SCNC: 103 MMOL/L (ref 98–107)
CHOLEST SERPL-MCNC: 223 MG/DL (ref 0–200)
CO2 SERPL-SCNC: 29.8 MMOL/L (ref 22–29)
CREAT SERPL-MCNC: 0.83 MG/DL (ref 0.57–1)
DEPRECATED RDW RBC AUTO: 52.6 FL (ref 37–54)
EGFRCR SERPLBLD CKD-EPI 2021: 75.5 ML/MIN/1.73
EOSINOPHIL # BLD AUTO: 0.06 10*3/MM3 (ref 0–0.4)
EOSINOPHIL NFR BLD AUTO: 1 % (ref 0.3–6.2)
ERYTHROCYTE [DISTWIDTH] IN BLOOD BY AUTOMATED COUNT: 16.7 % (ref 12.3–15.4)
FERRITIN SERPL-MCNC: 50.9 NG/ML (ref 13–150)
GLOBULIN UR ELPH-MCNC: 2.8 GM/DL
GLUCOSE SERPL-MCNC: 101 MG/DL (ref 65–99)
HBA1C MFR BLD: 5.8 % (ref 4.8–5.6)
HCT VFR BLD AUTO: 41.2 % (ref 34–46.6)
HDLC SERPL-MCNC: 64 MG/DL (ref 40–60)
HGB BLD-MCNC: 13.5 G/DL (ref 12–15.9)
IMM GRANULOCYTES # BLD AUTO: 0.02 10*3/MM3 (ref 0–0.05)
IMM GRANULOCYTES NFR BLD AUTO: 0.3 % (ref 0–0.5)
IRON 24H UR-MRATE: 58 MCG/DL (ref 37–145)
IRON SATN MFR SERPL: 11 % (ref 20–50)
LDLC SERPL CALC-MCNC: 132 MG/DL (ref 0–100)
LDLC/HDLC SERPL: 2.01 {RATIO}
LYMPHOCYTES # BLD AUTO: 1.39 10*3/MM3 (ref 0.7–3.1)
LYMPHOCYTES NFR BLD AUTO: 22.1 % (ref 19.6–45.3)
MCH RBC QN AUTO: 28.8 PG (ref 26.6–33)
MCHC RBC AUTO-ENTMCNC: 32.8 G/DL (ref 31.5–35.7)
MCV RBC AUTO: 87.8 FL (ref 79–97)
MONOCYTES # BLD AUTO: 0.37 10*3/MM3 (ref 0.1–0.9)
MONOCYTES NFR BLD AUTO: 5.9 % (ref 5–12)
NEUTROPHILS NFR BLD AUTO: 4.44 10*3/MM3 (ref 1.7–7)
NEUTROPHILS NFR BLD AUTO: 70.4 % (ref 42.7–76)
NRBC BLD AUTO-RTO: 0 /100 WBC (ref 0–0.2)
PLATELET # BLD AUTO: 219 10*3/MM3 (ref 140–450)
PMV BLD AUTO: 10.6 FL (ref 6–12)
POTASSIUM SERPL-SCNC: 4.5 MMOL/L (ref 3.5–5.2)
PROT SERPL-MCNC: 7 G/DL (ref 6–8.5)
RBC # BLD AUTO: 4.69 10*6/MM3 (ref 3.77–5.28)
SODIUM SERPL-SCNC: 142 MMOL/L (ref 136–145)
T4 FREE SERPL-MCNC: 1.19 NG/DL (ref 0.93–1.7)
TIBC SERPL-MCNC: 523 MCG/DL (ref 298–536)
TRANSFERRIN SERPL-MCNC: 351 MG/DL (ref 200–360)
TRIGL SERPL-MCNC: 153 MG/DL (ref 0–150)
TSH SERPL DL<=0.05 MIU/L-ACNC: 3.06 UIU/ML (ref 0.27–4.2)
VLDLC SERPL-MCNC: 27 MG/DL (ref 5–40)
WBC NRBC COR # BLD AUTO: 6.3 10*3/MM3 (ref 3.4–10.8)

## 2025-01-27 PROCEDURE — 83540 ASSAY OF IRON: CPT | Performed by: STUDENT IN AN ORGANIZED HEALTH CARE EDUCATION/TRAINING PROGRAM

## 2025-01-27 PROCEDURE — 80053 COMPREHEN METABOLIC PANEL: CPT | Performed by: STUDENT IN AN ORGANIZED HEALTH CARE EDUCATION/TRAINING PROGRAM

## 2025-01-27 PROCEDURE — 84439 ASSAY OF FREE THYROXINE: CPT | Performed by: STUDENT IN AN ORGANIZED HEALTH CARE EDUCATION/TRAINING PROGRAM

## 2025-01-27 PROCEDURE — 84443 ASSAY THYROID STIM HORMONE: CPT | Performed by: STUDENT IN AN ORGANIZED HEALTH CARE EDUCATION/TRAINING PROGRAM

## 2025-01-27 PROCEDURE — 85025 COMPLETE CBC W/AUTO DIFF WBC: CPT | Performed by: STUDENT IN AN ORGANIZED HEALTH CARE EDUCATION/TRAINING PROGRAM

## 2025-01-27 PROCEDURE — 83036 HEMOGLOBIN GLYCOSYLATED A1C: CPT | Performed by: STUDENT IN AN ORGANIZED HEALTH CARE EDUCATION/TRAINING PROGRAM

## 2025-01-27 PROCEDURE — 82728 ASSAY OF FERRITIN: CPT | Performed by: STUDENT IN AN ORGANIZED HEALTH CARE EDUCATION/TRAINING PROGRAM

## 2025-01-27 PROCEDURE — 84466 ASSAY OF TRANSFERRIN: CPT | Performed by: STUDENT IN AN ORGANIZED HEALTH CARE EDUCATION/TRAINING PROGRAM

## 2025-01-27 PROCEDURE — 36415 COLL VENOUS BLD VENIPUNCTURE: CPT

## 2025-01-27 PROCEDURE — 80061 LIPID PANEL: CPT | Performed by: STUDENT IN AN ORGANIZED HEALTH CARE EDUCATION/TRAINING PROGRAM

## 2025-01-27 RX ORDER — FAMOTIDINE 20 MG/1
20 TABLET, FILM COATED ORAL EVERY EVENING
Qty: 30 TABLET | Refills: 2 | Status: SHIPPED | OUTPATIENT
Start: 2025-01-27

## 2025-01-27 RX ORDER — OMEPRAZOLE 40 MG/1
40 CAPSULE, DELAYED RELEASE ORAL DAILY
Qty: 90 CAPSULE | Refills: 3 | Status: SHIPPED | OUTPATIENT
Start: 2025-01-27

## 2025-01-27 RX ORDER — AMLODIPINE BESYLATE 5 MG/1
5 TABLET ORAL DAILY
Qty: 90 TABLET | Refills: 3 | Status: SHIPPED | OUTPATIENT
Start: 2025-01-27

## 2025-01-27 RX ORDER — ALPRAZOLAM 0.5 MG
1 TABLET ORAL 2 TIMES DAILY PRN
Qty: 60 TABLET | Refills: 1 | Status: SHIPPED | OUTPATIENT
Start: 2025-01-27

## 2025-01-27 NOTE — PROGRESS NOTES
Subjective   The ABCs of the Annual Wellness Visit  Medicare Wellness Visit      Kitty Berry is a 69 y.o. patient who presents for a Medicare Wellness Visit.    The following portions of the patient's history were reviewed and   updated as appropriate: allergies, current medications, past family history, past medical history, past social history, past surgical history, and problem list.    Compared to one year ago, the patient's physical   health is the same.  Compared to one year ago, the patient's mental   health is the same.    Recent Hospitalizations:  She was not admitted to the hospital during the last year.     Current Medical Providers:  Patient Care Team:  Cici Martinez MD as PCP - General  Edwina Cid MD as Consulting Physician (Pulmonary Disease)    Outpatient Medications Prior to Visit   Medication Sig Dispense Refill    fexofenadine (ALLEGRA) 180 MG tablet Take 1 tablet by mouth Daily. (Patient taking differently: Take 1 tablet by mouth Daily As Needed.) 30 tablet 3    fluticasone (FLONASE) 50 MCG/ACT nasal spray 2 sprays into the nostril(s) as directed by provider Daily. (Patient taking differently: Administer 2 sprays into the nostril(s) as directed by provider Daily As Needed.) 16 g 3    Lumigan 0.01 % ophthalmic drops Administer 1 drop to both eyes Every Night.      meloxicam (MOBIC) 15 MG tablet Take 1 tablet by mouth Daily.      Multiple Vitamins-Minerals (MULTIVITAMIN WITH MINERALS) tablet tablet Take 1 tablet by mouth Daily.      Omega-3 Fatty Acids (FISH OIL) 1000 MG capsule capsule Take 1 capsule by mouth Daily With Breakfast.      Vitamin D, Cholecalciferol, 25 MCG (1000 UT) tablet Take 1 tablet by mouth Daily.      ALPRAZolam (XANAX) 0.5 MG tablet Take 2 tablets by mouth 2 (Two) Times a Day As Needed for Anxiety. 60 tablet 1    amLODIPine (NORVASC) 5 MG tablet TAKE 1 TABLET BY MOUTH EVERY DAY 90 tablet 0    famotidine (PEPCID) 20 MG tablet TAKE 1 TABLET BY MOUTH EVERY  "EVENING 30 tablet 2    levothyroxine (SYNTHROID, LEVOTHROID) 50 MCG tablet Take 1 tablet by mouth Daily. 90 tablet 2    omeprazole (priLOSEC) 40 MG capsule Take 1 capsule by mouth Daily. 90 capsule 2    furosemide (LASIX) 20 MG tablet Take 1 tablet by mouth Daily. 30 tablet 1    gabapentin (NEURONTIN) 300 MG capsule TAKE 1 CAPSULE BY MOUTH AT BEDTIME 30 capsule 0     No facility-administered medications prior to visit.     No opioid medication identified on active medication list. I have reviewed chart for other potential  high risk medication/s and harmful drug interactions in the elderly.      Aspirin is not on active medication list.  Aspirin use is not indicated based on review of current medical condition/s. Risk of harm outweighs potential benefits.  .    Patient Active Problem List   Diagnosis    Anxiety    Encounter for general adult medical examination without abnormal findings    Gastroesophageal reflux disease    Hypertension    Osteoarthritis    DDD (degenerative disc disease), lumbosacral    Acquired hypothyroidism    Obesity    Alcohol abuse    Postmenopause    Presbyopia    Tear film insufficiency    Vitreous degeneration    Lumbar radiculopathy    Lumbar spondylosis    Degeneration of lumbar intervertebral disc    Seasonal allergic rhinitis due to pollen    Iron deficiency anemia    Cardiomegaly    Dyspnea on exertion    Restrictive lung disease    Alcohol use disorder     Advance Care Planning Advance Directive is not on file.  ACP discussion was held with the patient during this visit. Patient has an advance directive (not in EMR), copy requested.            Objective   Vitals:    01/27/25 0946   BP: 128/84   Pulse: 78   Resp: 18   SpO2: 97%   Weight: 91.9 kg (202 lb 8 oz)   Height: 157.5 cm (62\")   PainSc: 0-No pain       Estimated body mass index is 37.04 kg/m² as calculated from the following:    Height as of this encounter: 157.5 cm (62\").    Weight as of this encounter: 91.9 kg (202 lb 8 " oz).                Does the patient have evidence of cognitive impairment? No  Lab Results   Component Value Date    TRIG 153 (H) 2025    HDL 64 (H) 2025     (H) 2025    VLDL 2025    HGBA1C 5.80 (H) 2025                                                                                                Health  Risk Assessment    Smoking Status:  Social History     Tobacco Use   Smoking Status Former    Current packs/day: 0.00    Average packs/day: 1 pack/day for 5.0 years (5.0 ttl pk-yrs)    Types: Cigarettes    Start date: 1994    Quit date: 1999    Years since quittin.1    Passive exposure: Past   Smokeless Tobacco Never   Tobacco Comments    Occasionally     Alcohol Consumption:  Social History     Substance and Sexual Activity   Alcohol Use Yes    Comment: Daily       Fall Risk Screen  HARINDERADI Fall Risk Assessment was completed, and patient is at LOW risk for falls.Assessment completed on:2025    Depression Screening   Little interest or pleasure in doing things? Not at all   Feeling down, depressed, or hopeless? Not at all   PHQ-2 Total Score 0      Health Habits and Functional and Cognitive Screenin/20/2025    11:13 AM   Functional & Cognitive Status   Do you have difficulty preparing food and eating? No    Do you have difficulty bathing yourself, getting dressed or grooming yourself? No    Do you have difficulty using the toilet? No    Do you have difficulty moving around from place to place? No    Do you have trouble with steps or getting out of a bed or a chair? No    Current Diet Limited Junk Food    Dental Exam Up to date    Eye Exam Up to date    Exercise (times per week) 1 times per week    Current Exercises Include Walking    Do you need help using the phone?  No    Are you deaf or do you have serious difficulty hearing?  No    Do you need help to go to places out of walking distance? No    Do you need help shopping? No    Do you need help  preparing meals?  No    Do you need help with housework?  No    Do you need help with laundry? No    Do you need help taking your medications? No    Do you need help managing money? No    Do you ever drive or ride in a car without wearing a seat belt? No    Have you felt unusual stress, anger or loneliness in the last month? No    Who do you live with? Spouse    If you need help, do you have trouble finding someone available to you? No    Have you been bothered in the last four weeks by sexual problems? No    Do you have difficulty concentrating, remembering or making decisions? No        Patient-reported           Age-appropriate Screening Schedule:  Refer to the list below for future screening recommendations based on patient's age, sex and/or medical conditions. Orders for these recommended tests are listed in the plan section. The patient has been provided with a written plan.    Health Maintenance List  Health Maintenance   Topic Date Due    TDAP/TD VACCINES (1 - Tdap) Never done    ZOSTER VACCINE (1 of 2) Never done    COVID-19 Vaccine (6 - 2024-25 season) 09/01/2024    DXA SCAN  12/30/2024    COLORECTAL CANCER SCREENING  06/03/2025    BMI FOLLOWUP  06/19/2025    ANNUAL WELLNESS VISIT  01/27/2026    MAMMOGRAM  12/12/2026    HEPATITIS C SCREENING  Completed    INFLUENZA VACCINE  Completed    Pneumococcal Vaccine 50+  Completed                                                                                                                                                CMS Preventative Services Quick Reference  Risk Factors Identified During Encounter  Immunizations Discussed/Encouraged: Tdap    The above risks/problems have been discussed with the patient.  Pertinent information has been shared with the patient in the After Visit Summary.  An After Visit Summary and PPPS were made available to the patient.    Follow Up:   Next Medicare Wellness visit to be scheduled in 1 year.         Additional E&M Note during  same encounter follows:  Patient has additional, significant, and separately identifiable condition(s)/problem(s) that require work above and beyond the Medicare Wellness Visit     Chief Complaint  Medicare Wellness-subsequent    Subjective   HPI  Kitty is also being seen today for an annual adult preventative physical exam.         History of Present Illness    Knee Pain  - Underwent knee replacement surgery in August 2024, resulting in reduced swelling  - Discontinued meloxicam after 9 years due to ineffectiveness  - Experiences occasional pain in right knee, especially when ascending stairs  - Suspects bone-on-bone contact in right knee  - Upcoming appointment with orthopedic surgeon on Wednesday to request an x-ray of right knee    Umbilical Pain  - Experienced sharp pain in umbilical region 2 to 3 weeks ago, now resolved  - Bowel movements have remained regular    Pulmonary Fibrosis  - Continues to experience respiratory distress despite extensive blood work  - Follow-up appointment scheduled for next month  - Condition likely due to radiation exposure, resulting in scarring  - Reports improvement in walking distance and speed  - Does not believe further treatment is necessary at this time    Thyroid Issues  - Reports no new symptoms related to thyroid condition  - Maintains daily medication regimen    GERD  - Takes famotidine more frequently than omeprazole  - Takes Pepcid and blood pressure medication nightly  - Continues to take iron supplements on Monday, Wednesday, and Friday  - Recent blood work showed hemoglobin level of 14d  - Reports no constipation or other side effects from iron supplements    Low Back Pain  - Underwent back ablations, one prior to knee surgery and another on 01/02/2025 by Dr. Pickens  - Experienced significant pain post-procedure, used cane for mobility  - Lower back was sore for some time post-ablation, discomfort has since subsided  - Anticipates call from provider to discuss  "outcome of procedure          L knee replacement  R knee replacement    Back ablation - commonalth pain Dr. Pickens  Last Jan 2nd; had one prior to knee surgery    Iron MWF  Gave blood Hgb 14    DEXA: 2022 NBD      Objective   Vital Signs:  /84   Pulse 78   Resp 18   Ht 157.5 cm (62\")   Wt 91.9 kg (202 lb 8 oz)   SpO2 97%   BMI 37.04 kg/m²   Physical Exam  Vitals reviewed.   Constitutional:       General: She is not in acute distress.     Appearance: Normal appearance.   HENT:      Head: Normocephalic and atraumatic.      Right Ear: Tympanic membrane, ear canal and external ear normal.      Left Ear: Tympanic membrane, ear canal and external ear normal.      Nose: Nose normal. No congestion.      Mouth/Throat:      Mouth: Mucous membranes are moist.      Pharynx: Oropharynx is clear. No oropharyngeal exudate or posterior oropharyngeal erythema.   Eyes:      General: No scleral icterus.     Extraocular Movements: Extraocular movements intact.      Conjunctiva/sclera: Conjunctivae normal.      Pupils: Pupils are equal, round, and reactive to light.   Neck:      Thyroid: No thyromegaly or thyroid tenderness.      Trachea: Trachea normal.   Cardiovascular:      Rate and Rhythm: Normal rate and regular rhythm.      Pulses: Normal pulses.      Heart sounds: Normal heart sounds, S1 normal and S2 normal. No murmur heard.  Pulmonary:      Effort: Pulmonary effort is normal. No respiratory distress.      Breath sounds: Normal breath sounds and air entry. No wheezing, rhonchi or rales.   Abdominal:      General: Bowel sounds are normal. There is no distension.      Palpations: Abdomen is soft.      Tenderness: There is no abdominal tenderness. There is no guarding.   Musculoskeletal:         General: No deformity or signs of injury. Normal range of motion.      Cervical back: Normal range of motion and neck supple. No tenderness.      Right lower leg: No edema.      Left lower leg: No edema.   Lymphadenopathy:      " Cervical: No cervical adenopathy.   Skin:     General: Skin is warm and dry.      Findings: No rash.   Neurological:      General: No focal deficit present.      Mental Status: She is alert and oriented to person, place, and time.      Motor: No weakness.      Gait: Gait normal.   Psychiatric:         Mood and Affect: Mood normal.         Behavior: Behavior normal.           Assessment and Plan            Medicare annual wellness visit, subsequent  Cancer and preventative screenings were reviewed and ordered as indicated (see below). Immunizations were reviewed and education/instructions were provided on any recommended vaccines that are currenlty due. Routine labs ordered today for monitoring and screening as below. Counseled patient on diet, exercise, weight, vaccines, disease/screening prevention, safety, risk reduction, dental/vision care, and mental health. Addressed all patient/family questions.   Orders:    CBC Auto Differential; Future    Comprehensive Metabolic Panel; Future    Lipid Panel; Future    Hemoglobin A1c; Future    Influenza vaccine needed  Influenza vaccine administered in office today  Orders:    Fluzone High-Dose 65+yrs (4963-5145)    Anxiety  Chronic, stable  Continue current regimen  Orders:    ALPRAZolam (XANAX) 0.5 MG tablet; Take 2 tablets by mouth 2 (Two) Times a Day As Needed for Anxiety.    Acquired hypothyroidism  Chronic, stable  Continue current regimen with levothyroine 50 mcg  Orders:    TSH+Free T4; Future    Iron deficiency anemia, unspecified iron deficiency anemia type  Chronic, improving  Check labs today. Continue iron supplementation.   Orders:    Iron Profile; Future    Ferritin; Future    Primary hypertension  Chronic, stable  Continue current regimen: amlodipine 5 mg daily                Follow Up   Return in about 6 months (around 7/27/2025) for med refills, chronic conditions.  Patient was given instructions and counseling regarding her condition or for health  maintenance advice. Please see specific information pulled into the AVS if appropriate.

## 2025-01-27 NOTE — ASSESSMENT & PLAN NOTE
Chronic, improving  Check labs today. Continue iron supplementation.   Orders:    Iron Profile; Future    Ferritin; Future

## 2025-01-27 NOTE — ASSESSMENT & PLAN NOTE
Chronic, stable  Continue current regimen  Orders:    ALPRAZolam (XANAX) 0.5 MG tablet; Take 2 tablets by mouth 2 (Two) Times a Day As Needed for Anxiety.

## 2025-01-27 NOTE — ASSESSMENT & PLAN NOTE
Chronic, stable  Continue current regimen with levothyroine 50 mcg  Orders:    TSH+Free T4; Future

## 2025-02-07 DIAGNOSIS — E03.9 ACQUIRED HYPOTHYROIDISM: ICD-10-CM

## 2025-02-07 RX ORDER — LEVOTHYROXINE SODIUM 50 UG/1
50 TABLET ORAL DAILY
Qty: 90 TABLET | Refills: 1 | Status: SHIPPED | OUTPATIENT
Start: 2025-02-07

## 2025-04-30 ENCOUNTER — HOSPITAL ENCOUNTER (OUTPATIENT)
Dept: CT IMAGING | Facility: HOSPITAL | Age: 70
Discharge: HOME | End: 2025-04-30

## 2025-04-30 ENCOUNTER — HOSPITAL ENCOUNTER (OUTPATIENT)
Dept: ULTRASOUND IMAGING | Facility: HOSPITAL | Age: 70
Discharge: HOME | End: 2025-04-30

## 2025-04-30 DIAGNOSIS — Z00.00 ANNUAL PHYSICAL EXAM: ICD-10-CM

## 2025-04-30 LAB
BH CV VAS SCREENING CAROTID CCA LEFT: 43 CM/SEC
BH CV VAS SCREENING CAROTID CCA RIGHT: 63 CM/SEC
BH CV VAS SCREENING CAROTID ICA LEFT: 87 CM/SEC
BH CV VAS SCREENING CAROTID ICA RIGHT: 46 CM/SEC
BH CV XLRA MEAS - MID AO DIAM: 1.99 CM
BH CV XLRA MEAS - PAD LEFT ABI PT: 0.92
BH CV XLRA MEAS - PAD LEFT ARM: 158 MMHG
BH CV XLRA MEAS - PAD LEFT LEG PT: 146 MMHG
BH CV XLRA MEAS - PAD RIGHT ABI PT: 1.13
BH CV XLRA MEAS - PAD RIGHT LEG PT: 179 MMHG
BH CV XLRA MEAS LEFT DIST CCA EDV: 14.3 CM/SEC
BH CV XLRA MEAS LEFT DIST CCA PSV: 42.8 CM/SEC
BH CV XLRA MEAS LEFT ICA/CCA RATIO: 2.02
BH CV XLRA MEAS LEFT PROX ICA EDV: 32.4 CM/SEC
BH CV XLRA MEAS LEFT PROX ICA PSV: 86.6 CM/SEC
BH CV XLRA MEAS RIGHT DIST CCA EDV: 22.6 CM/SEC
BH CV XLRA MEAS RIGHT DIST CCA PSV: 63.1 CM/SEC
BH CV XLRA MEAS RIGHT ICA/CCA RATIO: 0.73
BH CV XLRA MEAS RIGHT PROX ICA EDV: 13.2 CM/SEC
BH CV XLRA MEAS RIGHT PROX ICA PSV: 46.2 CM/SEC

## 2025-04-30 PROCEDURE — VASCULARSCN2 VASCULAR SCREENING (BUNDLE) CAR: Performed by: INTERNAL MEDICINE

## 2025-04-30 PROCEDURE — 75571 CT HRT W/O DYE W/CA TEST: CPT

## 2025-04-30 PROCEDURE — 93799 UNLISTED CV SVC/PROCEDURE: CPT

## 2025-06-04 ENCOUNTER — TRANSCRIBE ORDERS (OUTPATIENT)
Dept: ADMINISTRATIVE | Facility: HOSPITAL | Age: 70
End: 2025-06-04
Payer: MEDICARE

## 2025-06-04 ENCOUNTER — HOSPITAL ENCOUNTER (OUTPATIENT)
Dept: GENERAL RADIOLOGY | Facility: HOSPITAL | Age: 70
Discharge: HOME | End: 2025-06-04
Admitting: INTERNAL MEDICINE
Payer: MEDICARE

## 2025-06-04 DIAGNOSIS — M54.50 LOW BACK PAIN, UNSPECIFIED BACK PAIN LATERALITY, UNSPECIFIED CHRONICITY, UNSPECIFIED WHETHER SCIATICA PRESENT: Primary | ICD-10-CM

## 2025-06-04 DIAGNOSIS — M54.50 LOW BACK PAIN, UNSPECIFIED BACK PAIN LATERALITY, UNSPECIFIED CHRONICITY, UNSPECIFIED WHETHER SCIATICA PRESENT: ICD-10-CM

## 2025-06-04 PROCEDURE — 72110 X-RAY EXAM L-2 SPINE 4/>VWS: CPT

## 2025-07-25 ENCOUNTER — ON CAMPUS - OUTPATIENT (OUTPATIENT)
Dept: URBAN - METROPOLITAN AREA HOSPITAL 2 | Facility: HOSPITAL | Age: 70
End: 2025-07-25
Payer: COMMERCIAL

## 2025-07-25 VITALS
SYSTOLIC BLOOD PRESSURE: 137 MMHG | WEIGHT: 207 LBS | HEART RATE: 70 BPM | SYSTOLIC BLOOD PRESSURE: 117 MMHG | HEART RATE: 75 BPM | SYSTOLIC BLOOD PRESSURE: 132 MMHG | OXYGEN SATURATION: 95 % | RESPIRATION RATE: 16 BRPM | OXYGEN SATURATION: 96 % | SYSTOLIC BLOOD PRESSURE: 141 MMHG | DIASTOLIC BLOOD PRESSURE: 79 MMHG | DIASTOLIC BLOOD PRESSURE: 89 MMHG | HEART RATE: 69 BPM | OXYGEN SATURATION: 98 % | HEIGHT: 62 IN | HEART RATE: 66 BPM | RESPIRATION RATE: 21 BRPM | RESPIRATION RATE: 20 BRPM | SYSTOLIC BLOOD PRESSURE: 121 MMHG | RESPIRATION RATE: 15 BRPM | HEART RATE: 78 BPM | DIASTOLIC BLOOD PRESSURE: 81 MMHG | OXYGEN SATURATION: 94 % | DIASTOLIC BLOOD PRESSURE: 91 MMHG | OXYGEN SATURATION: 99 % | SYSTOLIC BLOOD PRESSURE: 140 MMHG | HEART RATE: 61 BPM | RESPIRATION RATE: 19 BRPM | DIASTOLIC BLOOD PRESSURE: 87 MMHG | HEART RATE: 82 BPM | RESPIRATION RATE: 14 BRPM | DIASTOLIC BLOOD PRESSURE: 71 MMHG | SYSTOLIC BLOOD PRESSURE: 159 MMHG | TEMPERATURE: 97.2 F

## 2025-07-25 DIAGNOSIS — D12.0 BENIGN NEOPLASM OF CECUM: ICD-10-CM

## 2025-07-25 DIAGNOSIS — Z86.0101 PERSONAL HISTORY OF ADENOMATOUS AND SERRATED COLON POLYPS: ICD-10-CM

## 2025-07-25 DIAGNOSIS — Z09 ENCOUNTER FOR FOLLOW-UP EXAMINATION AFTER COMPLETED TREATMEN: ICD-10-CM

## 2025-07-25 DIAGNOSIS — D12.5 BENIGN NEOPLASM OF SIGMOID COLON: ICD-10-CM

## 2025-07-25 DIAGNOSIS — K64.1 SECOND DEGREE HEMORRHOIDS: ICD-10-CM

## 2025-07-25 DIAGNOSIS — K57.30 DIVERTICULOSIS OF LARGE INTESTINE WITHOUT PERFORATION OR ABS: ICD-10-CM

## 2025-07-25 DIAGNOSIS — D12.2 BENIGN NEOPLASM OF ASCENDING COLON: ICD-10-CM

## 2025-07-25 DIAGNOSIS — K62.1 RECTAL POLYP: ICD-10-CM

## 2025-07-25 PROBLEM — Z86.010 SURVEILLANCE DUE TO PRIOR COLONIC NEOPLASIA: Status: ACTIVE | Noted: 2025-07-25

## 2025-07-25 LAB
GI HISTOLOGY: A. CECUM: (no result)
GI HISTOLOGY: B. DISTAL ASCENDING COLON: (no result)
GI HISTOLOGY: C. SIGMOID COLON: (no result)
GI HISTOLOGY: D. RECTUM: (no result)
GI HISTOLOGY: PDF REPORT: (no result)

## 2025-07-25 PROCEDURE — 45385 COLONOSCOPY W/LESION REMOVAL: CPT | Mod: 33 | Performed by: INTERNAL MEDICINE
